# Patient Record
Sex: FEMALE | Race: WHITE | NOT HISPANIC OR LATINO | Employment: FULL TIME | URBAN - METROPOLITAN AREA
[De-identification: names, ages, dates, MRNs, and addresses within clinical notes are randomized per-mention and may not be internally consistent; named-entity substitution may affect disease eponyms.]

---

## 2017-01-27 ENCOUNTER — OFFICE VISIT (OUTPATIENT)
Dept: FAMILY MEDICINE | Facility: CLINIC | Age: 42
End: 2017-01-27
Payer: COMMERCIAL

## 2017-01-27 VITALS
BODY MASS INDEX: 19.75 KG/M2 | SYSTOLIC BLOOD PRESSURE: 100 MMHG | TEMPERATURE: 97.4 F | HEART RATE: 90 BPM | DIASTOLIC BLOOD PRESSURE: 66 MMHG | OXYGEN SATURATION: 99 % | RESPIRATION RATE: 12 BRPM | WEIGHT: 122.9 LBS | HEIGHT: 66 IN

## 2017-01-27 DIAGNOSIS — F90.1 ATTENTION-DEFICIT HYPERACTIVITY DISORDER, PREDOMINANTLY HYPERACTIVE TYPE: ICD-10-CM

## 2017-01-27 DIAGNOSIS — F41.9 ANXIETY AND DEPRESSION: Primary | ICD-10-CM

## 2017-01-27 DIAGNOSIS — F32.A ANXIETY AND DEPRESSION: Primary | ICD-10-CM

## 2017-01-27 PROCEDURE — 99213 OFFICE O/P EST LOW 20 MIN: CPT | Performed by: FAMILY MEDICINE

## 2017-01-27 RX ORDER — VENLAFAXINE HYDROCHLORIDE 75 MG/1
75 CAPSULE, EXTENDED RELEASE ORAL DAILY
Qty: 90 CAPSULE | Refills: 2 | Status: SHIPPED | OUTPATIENT
Start: 2017-01-27 | End: 2017-04-27

## 2017-01-27 RX ORDER — DEXTROAMPHETAMINE SACCHARATE, AMPHETAMINE ASPARTATE, DEXTROAMPHETAMINE SULFATE AND AMPHETAMINE SULFATE 7.5; 7.5; 7.5; 7.5 MG/1; MG/1; MG/1; MG/1
30 TABLET ORAL DAILY
Qty: 30 TABLET | Refills: 0 | Status: SHIPPED | OUTPATIENT
Start: 2017-01-27 | End: 2017-03-14

## 2017-01-27 ASSESSMENT — PAIN SCALES - GENERAL: PAINLEVEL: NO PAIN (0)

## 2017-01-27 NOTE — PROGRESS NOTES
SUBJECTIVE:                                                    Marjorie Holbrook is a 41 year old female who presents to clinic today for the following health issues:        Depression and Anxiety Follow-Up    Status since last visit: Improved     Other associated symptoms:None    Complicating factors:     Significant life event: No     Current substance abuse: None    PHQ-9 SCORE 11/3/2016 11/28/2016 12/29/2016   Total Score - - -   Total Score 21 12 3     JORDAN-7 SCORE 11/3/2016 11/28/2016 12/29/2016   Total Score - - -   Total Score 13 12 7        PHQ-9  English      PHQ-9   Any Language     GAD7         Amount of exercise or physical activity: None    Problems taking medications regularly: No    Medication side effects: none    Diet: regular (no restrictions)    Marjorie is here today for follow-up on anxiety/depression and ADHD. She was seen  a month ago and please see my dictation for further details. At the last visits, she was noted significant weight loss and there was concerns of the possible side effect of the Adderall that she was been taking. She was recommended to follow up. She is taking the Adderall daily as prescribed. She is also on Effexor for her depression and anxiety. Been taking medication prescribed in no side effect. Overall she is feeling great. Denies being depressed. No more anxiety. The Adderall is working well for her as well. She is able to concentrate and complete her task at and home and at work. Normal appetite and tolerated by mouth intake well. No heart palpitation or having trouble with sleeping. No personal or family history of cardiomyopathy. Denies drug use. She would like to continue with the medications. No suicidal or homicidal ideation. No hallucination. No other concern.     Problem list and histories reviewed & adjusted, as indicated.  Additional history: as documented    Patient Active Problem List   Diagnosis     CARDIOVASCULAR SCREENING; LDL GOAL LESS THAN 160      Menorrhagia     Dysmenorrhea     Iron deficiency anemia     Tobacco use disorder     Anxiety and depression     Attention-deficit hyperactivity disorder, predominantly hyperactive type     Past Surgical History   Procedure Laterality Date     Tubal ligation  2000     Tonsillectomy  1996     Dilation and curettage, hysteroscopy, ablate endometrium novasure, combined  9/20/2013     Procedure: COMBINED DILATION AND CURETTAGE, HYSTEROSCOPY, ABLATE ENDOMETRIUM NOVASURE;  Hysteroscopy, Novasure Endometrial Ablation, Currettings;  Surgeon: Michaelle Duran MD;  [facility]     Colonoscopy N/A 10/27/2014     Procedure: COLONOSCOPY;  Surgeon: Rashawn Mayer MD;  [facility]     Laparoscopy diagnostic (gyn) N/A 12/1/2014     Procedure: LAPAROSCOPY DIAGNOSTIC (GYN);  Surgeon: Rk العلي MD;  [facility]     Abdomen surgery       recent surgery see records     Biopsy       recent see records     Laparoscopic hysterectomy total N/A 4/7/2015     Procedure: LAPAROSCOPIC HYSTERECTOMY TOTAL;  Surgeon: Rk العلي MD;  [facility]     Salpingectomy Bilateral 4/7/2015     Procedure: SALPINGECTOMY;  Surgeon: Rk العلي MD;  [facility]     Cystoscopy N/A 4/7/2015     Procedure: CYSTOSCOPY;  Surgeon: Rk العلي MD;  [facility]       Social History   Substance Use Topics     Smoking status: Current Every Day Smoker -- 0.50 packs/day for 9 years     Types: Cigarettes     Start date: 01/01/2008     Smokeless tobacco: Never Used      Comment: less than .5 pack, started age 32     Alcohol Use: 0.0 oz/week     0 Standard drinks or equivalent per week      Comment: occ.     Family History   Problem Relation Age of Onset     Family History Negative Child      Thyroid Disease Mother      Alcohol/Drug Father      Respiratory Father      COPD     Depression Father      HEART DISEASE Father      A-Fib     Hypertension Maternal Grandmother              "    Current Outpatient Prescriptions   Medication Sig Dispense Refill     venlafaxine (EFFEXOR-XR) 75 MG 24 hr capsule Take 1 capsule (75 mg) by mouth daily 90 capsule 2     amphetamine-dextroamphetamine (ADDERALL) 30 MG per tablet Take 1 tablet (30 mg) by mouth daily 30 tablet 0     Allergies   Allergen Reactions     Metronidazole Nausea and Vomiting     Nkda [No Known Drug Allergies]        ROS:  Constitutional, HEENT, cardiovascular, pulmonary, gi and gu systems are negative, except as otherwise noted.    OBJECTIVE:                                                    /66 mmHg  Pulse 90  Temp(Src) 97.4  F (36.3  C) (Temporal)  Resp 12  Ht 5' 5.75\" (1.67 m)  Wt 122 lb 14.4 oz (55.747 kg)  BMI 19.99 kg/m2  SpO2 99%  LMP 04/30/2013  Body mass index is 19.99 kg/(m^2). - weight is stable since the last visit.  GENERAL: healthy, alert and no distress  HENT: ear canals and TM's normal.  Nares are non-congested. No tender with palpation to the sinuses.  NECK: no adenopathy and thyroid normal to palpation  RESP: lungs clear to auscultation - no rales, rhonchi or wheezes  CV: regular rate and rhythm, no murmur.  NEURO: Normal strength and tone, mentation intact and speech normal.  Dress appropriately.  PSYCH: mentation appears normal, affect normal/bright. Thought is intact, no hallucination, suicidal or homicidal     Diagnostic Test Results:  none      ASSESSMENT/PLAN:                                                    1. Anxiety and depression  Marjorie is overall doing well since the last visit.  She tolerates the Effexor well.  Compliance with the medication and encouraged her to keep up the good work. Continue with Effexor at 75 mg daily.  Follow up in 6 months, earlier as needed.  She was educated about symptoms to call in or be seen.  All of her questions were answered.    - venlafaxine (EFFEXOR-XR) 75 MG 24 hr capsule; Take 1 capsule (75 mg) by mouth daily  Dispense: 90 capsule; Refill: 2    2. " Attention-deficit hyperactivity disorder, predominantly hyperactive type  Marjorie is overall doing better.  She is responding well to the Adderall with no side effect from it.   Weight is stable.  Will continue with the current dose of Adderall and inform her that he should take it as needed only.  Follow up in 4 months, earlier as needed.  She feels comfortable with the plan and all of her questions were answered.    - amphetamine-dextroamphetamine (ADDERALL) 30 MG per tablet; Take 1 tablet (30 mg) by mouth daily  Dispense: 30 tablet; Refill: 0    Ovidio Jenkins Mai, MD  [facility]

## 2017-01-27 NOTE — NURSING NOTE
"Chief Complaint   Patient presents with     Depression     Anxiety     A.D.H.D       Initial /66 mmHg  Pulse 90  Temp(Src) 97.4  F (36.3  C) (Temporal)  Resp 12  Ht 5' 5.75\" (1.67 m)  Wt 122 lb 14.4 oz (55.747 kg)  BMI 19.99 kg/m2  SpO2 99%  LMP 04/30/2013 Estimated body mass index is 19.99 kg/(m^2) as calculated from the following:    Height as of this encounter: 5' 5.75\" (1.67 m).    Weight as of this encounter: 122 lb 14.4 oz (55.747 kg).  BP completed using cuff size: eliud Ford CMA (AAMA)      "

## 2017-03-14 DIAGNOSIS — F90.1 ATTENTION-DEFICIT HYPERACTIVITY DISORDER, PREDOMINANTLY HYPERACTIVE TYPE: ICD-10-CM

## 2017-03-14 RX ORDER — DEXTROAMPHETAMINE SACCHARATE, AMPHETAMINE ASPARTATE, DEXTROAMPHETAMINE SULFATE AND AMPHETAMINE SULFATE 7.5; 7.5; 7.5; 7.5 MG/1; MG/1; MG/1; MG/1
30 TABLET ORAL DAILY
Qty: 30 TABLET | Refills: 0 | Status: SHIPPED | OUTPATIENT
Start: 2017-03-14 | End: 2017-04-27

## 2017-03-14 NOTE — TELEPHONE ENCOUNTER
Adderall      Last Written Prescription Date:  1/27/17  Last Fill Quantity: 30,   # refills: 0  Last Office Visit with [facility] prescribing provider: 1/27/17  Future Office visit:       Routing refill request to provider for review/approval because:  Drug not on the [facility], [facility] or [facility] refill protocol or controlled substance    Katiuska Zabala MA 3/14/2017

## 2017-04-14 ENCOUNTER — TELEPHONE (OUTPATIENT)
Dept: FAMILY MEDICINE | Facility: CLINIC | Age: 42
End: 2017-04-14

## 2017-04-14 NOTE — TELEPHONE ENCOUNTER
Patient is due for a PHQ-9.  Index start date:4/3/17  Index end date:6/3/17    Will forward to schedulers to schedule patient for Effexor follow up before 6/3/17.  Please leave open when scheduled.  Thanks!  Mary Kraus RN

## 2017-04-14 NOTE — TELEPHONE ENCOUNTER
Scheduled patient for effexor follow up.    Thank you,   Judy Canada   for [facility]

## 2017-04-27 ENCOUNTER — OFFICE VISIT (OUTPATIENT)
Dept: FAMILY MEDICINE | Facility: CLINIC | Age: 42
End: 2017-04-27
Payer: COMMERCIAL

## 2017-04-27 VITALS
HEART RATE: 105 BPM | BODY MASS INDEX: 19.35 KG/M2 | RESPIRATION RATE: 18 BRPM | WEIGHT: 119 LBS | DIASTOLIC BLOOD PRESSURE: 60 MMHG | SYSTOLIC BLOOD PRESSURE: 90 MMHG | TEMPERATURE: 97.7 F | OXYGEN SATURATION: 99 %

## 2017-04-27 DIAGNOSIS — F41.9 ANXIETY AND DEPRESSION: ICD-10-CM

## 2017-04-27 DIAGNOSIS — F51.04 PSYCHOPHYSIOLOGICAL INSOMNIA: ICD-10-CM

## 2017-04-27 DIAGNOSIS — F90.1 ATTENTION-DEFICIT HYPERACTIVITY DISORDER, PREDOMINANTLY HYPERACTIVE TYPE: Primary | ICD-10-CM

## 2017-04-27 DIAGNOSIS — F32.A ANXIETY AND DEPRESSION: ICD-10-CM

## 2017-04-27 PROCEDURE — 99214 OFFICE O/P EST MOD 30 MIN: CPT | Performed by: FAMILY MEDICINE

## 2017-04-27 RX ORDER — VENLAFAXINE HYDROCHLORIDE 150 MG/1
150 CAPSULE, EXTENDED RELEASE ORAL DAILY
Qty: 90 CAPSULE | Refills: 1 | Status: SHIPPED | OUTPATIENT
Start: 2017-04-27 | End: 2017-12-20

## 2017-04-27 RX ORDER — TRAZODONE HYDROCHLORIDE 50 MG/1
50 TABLET, FILM COATED ORAL
Qty: 30 TABLET | Refills: 0 | Status: SHIPPED | OUTPATIENT
Start: 2017-04-27 | End: 2017-07-06

## 2017-04-27 RX ORDER — DEXTROAMPHETAMINE SACCHARATE, AMPHETAMINE ASPARTATE, DEXTROAMPHETAMINE SULFATE AND AMPHETAMINE SULFATE 7.5; 7.5; 7.5; 7.5 MG/1; MG/1; MG/1; MG/1
30 TABLET ORAL DAILY
Qty: 30 TABLET | Refills: 0 | Status: SHIPPED | OUTPATIENT
Start: 2017-04-27 | End: 2017-06-07

## 2017-04-27 ASSESSMENT — PAIN SCALES - GENERAL: PAINLEVEL: NO PAIN (0)

## 2017-04-27 NOTE — PROGRESS NOTES
SUBJECTIVE:                                                    Marjorie Holbrook is a 41 year old female who presents to clinic today for the following health issues:      Depression Followup    Status since last visit: Worsened something has happened    See PHQ-9 for current symptoms.  Other associated symptoms: None    Complicating factors:   Significant life event:  Yes-  Grandma passed away   Current substance abuse:  None  Anxiety or Panic symptoms:  Yes-      PHQ-9  English PHQ-9   Any Language            Amount of exercise or physical activity: occ    Problems taking medications regularly: No    Medication side effects: none    Diet: regular (no restrictions)    Marjorie is here today for follow-up on her depression and ADHD.  She's been taking the medications I prescribed with no side effect. She thinks the Effexor is helping but her depression isnot fully controlled yet. Grandmother passed away - 2 months ago and since then, the depression has been worse. Stated that she was very close to her grandmother. Before the death of her grandmother, was doing well.  Still has trouble with falling sleep about 3-4 times a week.  Tried Melantonin 10 mg not helped.  Has had racing thoughts. Effexor with no side effect.      ADHD is doing well with the current dose of the Adderall. No side effect from it. No heart palpitation. Normal appetite. No personal or family history history of cardiomyopathy.         Problem list and histories reviewed & adjusted, as indicated.  Additional history: as documented    Patient Active Problem List   Diagnosis     Menorrhagia     Dysmenorrhea     Iron deficiency anemia     Tobacco use disorder     Anxiety and depression     Attention-deficit hyperactivity disorder, predominantly hyperactive type     Past Surgical History:   Procedure Laterality Date     ABDOMEN SURGERY      recent surgery see records     BIOPSY      recent see records     COLONOSCOPY N/A 10/27/2014    Procedure:  COLONOSCOPY;  Surgeon: Rashawn Mayer MD;  [facility]     CYSTOSCOPY N/A 4/7/2015    Procedure: CYSTOSCOPY;  Surgeon: Rk العلي MD;  [facility]     DILATION AND CURETTAGE, HYSTEROSCOPY, ABLATE ENDOMETRIUM NOVASURE, COMBINED  9/20/2013    Procedure: COMBINED DILATION AND CURETTAGE, HYSTEROSCOPY, ABLATE ENDOMETRIUM NOVASURE;  Hysteroscopy, Novasure Endometrial Ablation, Currettings;  Surgeon: Michaelle Duran MD;  [facility]     LAPAROSCOPIC HYSTERECTOMY TOTAL N/A 4/7/2015    Procedure: LAPAROSCOPIC HYSTERECTOMY TOTAL;  Surgeon: Rk العلي MD;  [facility]     LAPAROSCOPY DIAGNOSTIC (GYN) N/A 12/1/2014    Procedure: LAPAROSCOPY DIAGNOSTIC (GYN);  Surgeon: Rk العلي MD;  [facility]     SALPINGECTOMY Bilateral 4/7/2015    Procedure: SALPINGECTOMY;  Surgeon: Rk العلي MD;  [facility]     TONSILLECTOMY  1996     TUBAL LIGATION  2000       Social History   Substance Use Topics     Smoking status: Current Every Day Smoker     Packs/day: 0.50     Years: 9.00     Types: Cigarettes     Start date: 1/1/2008     Smokeless tobacco: Never Used      Comment: less than .5 pack, started age 32     Alcohol use 0.0 oz/week     0 Standard drinks or equivalent per week      Comment: occ.     Family History   Problem Relation Age of Onset     Thyroid Disease Mother      Alcohol/Drug Father      Respiratory Father      COPD     Depression Father      HEART DISEASE Father      A-Fib     Hypertension Maternal Grandmother      Family History Negative Child          Current Outpatient Prescriptions   Medication Sig Dispense Refill     venlafaxine (EFFEXOR-XR) 75 MG 24 hr capsule Take 1 capsule (150 mg) by mouth daily 90 capsule 1     amphetamine-dextroamphetamine (ADDERALL) 30 MG per tablet Take 1 tablet (30 mg) by mouth daily 30 tablet 0     Allergies   Allergen Reactions     Metronidazole Nausea and Vomiting       Reviewed and updated as  needed this visit by clinical staff  Tobacco  Allergies  Med Hx  Surg Hx  Fam Hx  Soc Hx      Reviewed and updated as needed this visit by Provider         ROS:  Constitutional, HEENT, cardiovascular, pulmonary, gi and gu systems are negative, except as otherwise noted.    OBJECTIVE:                                                    BP 90/60 (BP Location: Left arm, Patient Position: Chair, Cuff Size: Adult Regular)  Pulse 105  Temp 97.7  F (36.5  C) (Temporal)  Resp 18  Wt 119 lb (54 kg)  LMP 04/30/2013  SpO2 99%  BMI 19.35 kg/m2  Body mass index is 19.35 kg/(m^2).   GENERAL: healthy, alert and no distress  HENT: ear canals and TM's normal.  Nares are non-congested. No tender with palpation to the sinuses.  NECK: no adenopathy and thyroid normal to palpation  RESP: lungs clear to auscultation - no rales, rhonchi or wheezes  CV: regular rate and rhythm, no murmur.  NEURO: Normal strength and tone, mentation intact and speech normal.  Dress appropriately.  PSYCH: mentation appears normal, affect normal/bright. Thought is intact, no hallucination, suicidal or homicidal     Diagnostic Test Results:  none      ASSESSMENT/PLAN:                                                    1. Attention-deficit hyperactivity disorder, predominantly hyperactive type  Marjorie is overall doing better.  She is responding well to the Adderall XL and has no side effect from it.  Will continue with the current dose of Adderall and inform her that he should take it as needed only.  Side effect also discussed.  Follow up in 4 months, earlier as needed.  She feels comfortable with the plan and all of her questions were answered.  No contraindication identified.    - amphetamine-dextroamphetamine (ADDERALL) 30 MG per tablet; Take 1 tablet (30 mg) by mouth daily  Dispense: 30 tablet; Refill: 0    2. Anxiety and depression  Not controlled especially since the death of her grandmother. No active suicidal or homicidal ideation. No  hallucination. She tolerates the Effexor well. Discussed with her about the nature of the condition. Increased Effexor to 150 mg daily. Side effect discussed. Discussed with her about counseling but she declined. Follow-up in 2-3 months, earlier as needed. She was educated about symptoms that need to be seen or call in. All of her questions were answered and she feels comfortable with the plan.    - venlafaxine (EFFEXOR-XR) 150 MG 24 hr capsule; Take 1 capsule (150 mg) by mouth daily  Dispense: 90 capsule; Refill: 1  - traZODone (DESYREL) 50 MG tablet; Take 1 tablet (50 mg) by mouth nightly as needed for sleep  Dispense: 30 tablet; Refill: 0    3. Psychophysiological insomnia    Good sleep hygiene discussed. Encouraged her to avoid high caffeinated beverages after 3 PM. Emphasized on healthy diet and regular exercise. Will have her try the trazodone 50 mg at night as needed. Side effect discussed. Call in if symptoms persist or is worse.    - traZODone (DESYREL) 50 MG tablet; Take 1 tablet (50 mg) by mouth nightly as needed for sleep  Dispense: 30 tablet; Refill: 0    Ovidio Jenkins Mai, MD  [facility]

## 2017-04-27 NOTE — MR AVS SNAPSHOT
After Visit Summary   4/27/2017    Marjorie Holbrook    MRN: [id number]           Patient Information     Date Of Birth          1975        Visit Information        Provider Department      4/27/2017 8:40 AM Ovidio Toribio MD [facility]        Today's Diagnoses     Attention-deficit hyperactivity disorder, predominantly hyperactive type    -  1    Anxiety and depression        Psychophysiological insomnia           Follow-ups after your visit        Follow-up notes from your care team     Return in about 3 months (around 7/27/2017).      Who to contact     If you have questions or need follow up information about today's clinic visit or your schedule please contact [facility] directly at [phone].  Normal or non-critical lab and imaging results will be communicated to you by MyChart, letter or phone within 4 business days after the clinic has received the results. If you do not hear from us within 7 days, please contact the clinic through [employer] or phone. If you have a critical or abnormal lab result, we will notify you by phone as soon as possible.  Submit refill requests through [employer] or call your pharmacy and they will forward the refill request to us. Please allow 3 business days for your refill to be completed.          Additional Information About Your Visit        MyChart Information     [employer] gives you secure access to your electronic health record. If you see a primary care provider, you can also send messages to your care team and make appointments. If you have questions, please call your primary care clinic.  If you do not have a primary care provider, please call [phone] and they will assist you.        Care EveryWhere ID     This is your Care EveryWhere ID. This could be used by other organizations to access your [place] medical records  [id number]        Your Vitals Were     Pulse Temperature Respirations Last Period Pulse Oximetry BMI  (Body Mass Index)    105 97.7  F (36.5  C) (Temporal) 18 04/30/2013 99% 19.35 kg/m2       Blood Pressure from Last 3 Encounters:   04/27/17 90/60   01/27/17 100/66   12/29/16 98/62    Weight from Last 3 Encounters:   04/27/17 119 lb (54 kg)   01/27/17 122 lb 14.4 oz (55.7 kg)   12/29/16 121 lb 1.6 oz (54.9 kg)              Today, you had the following     No orders found for display         Today's Medication Changes          These changes are accurate as of: 4/27/17 11:59 PM.  If you have any questions, ask your nurse or doctor.               Start taking these medicines.        Dose/Directions    traZODone 50 MG tablet   Commonly known as:  DESYREL   Used for:  Anxiety and depression, Psychophysiological insomnia   Started by:  Ovidio Toribio MD        Dose:  50 mg   Take 1 tablet (50 mg) by mouth nightly as needed for sleep   Quantity:  30 tablet   Refills:  0         These medicines have changed or have updated prescriptions.        Dose/Directions    venlafaxine 150 MG 24 hr capsule   Commonly known as:  EFFEXOR-XR   This may have changed:    - medication strength  - how much to take   Used for:  Anxiety and depression   Changed by:  Ovidio Toribio MD        Dose:  150 mg   Take 1 capsule (150 mg) by mouth daily   Quantity:  90 capsule   Refills:  1            Where to get your medicines      These medications were sent [address]  [address] [facility] [address]     Phone:  [phone]     traZODone 50 MG tablet    venlafaxine 150 MG 24 hr capsule         Some of these will need a paper prescription and others can be bought over the counter.  Ask your nurse if you have questions.     Bring a paper prescription for each of these medications     amphetamine-dextroamphetamine 30 MG per tablet                Primary Care Provider Office Phone # Fax #    Cait Gonzalez PA-C [phone]       [address] [address]        Thank you!      Thank you for choosing [facility]  for your care. Our goal is always to provide you with excellent care. Hearing back from our patients is one way we can continue to improve our services. Please take a few minutes to complete the written survey that you may receive in the mail after your visit with us. Thank you!             Your Updated Medication List - Protect others around you: Learn how to safely use, store and throw away your medicines at www.disposemymeds.org.          This list is accurate as of: 4/27/17 11:59 PM.  Always use your most recent med list.                   Brand Name Dispense Instructions for use    amphetamine-dextroamphetamine 30 MG per tablet    ADDERALL    30 tablet    Take 1 tablet (30 mg) by mouth daily       traZODone 50 MG tablet    DESYREL    30 tablet    Take 1 tablet (50 mg) by mouth nightly as needed for sleep       venlafaxine 150 MG 24 hr capsule    EFFEXOR-XR    90 capsule    Take 1 capsule (150 mg) by mouth daily

## 2017-04-27 NOTE — NURSING NOTE
"Chief Complaint   Patient presents with     Depression       Initial BP 90/60 (BP Location: Left arm, Patient Position: Chair, Cuff Size: Adult Regular)  Pulse 105  Temp 97.7  F (36.5  C) (Temporal)  Resp 18  Wt 119 lb (54 kg)  LMP 04/30/2013  SpO2 99%  BMI 19.35 kg/m2 Estimated body mass index is 19.35 kg/(m^2) as calculated from the following:    Height as of 1/27/17: 5' 5.75\" (1.67 m).    Weight as of this encounter: 119 lb (54 kg).  Medication Reconciliation: complete   Елена/MUNA     "

## 2017-04-28 ASSESSMENT — PATIENT HEALTH QUESTIONNAIRE - PHQ9: SUM OF ALL RESPONSES TO PHQ QUESTIONS 1-9: 17

## 2017-06-07 DIAGNOSIS — F90.1 ATTENTION-DEFICIT HYPERACTIVITY DISORDER, PREDOMINANTLY HYPERACTIVE TYPE: ICD-10-CM

## 2017-06-07 NOTE — TELEPHONE ENCOUNTER
Adderall      Last Written Prescription Date:  4/27/17  Last Fill Quantity: 30,   # refills: 0  Last Office Visit with [facility], [facility] or [facility] prescribing provider: 4/27/17  Future Office visit:    Next 5 appointments (look out 90 days)     Jul 06, 2017  8:40 AM CDT   Office Visit with Ovidio Jenkins Mai, MD   [facility] ([facility]    [address] [place] [id number] [phone]                   Routing refill request to provider for review/approval because:  Drug not on the [facility], [facility] or [facility] refill protocol or controlled substance  Katiuska Zabala MA 6/7/2017

## 2017-06-14 RX ORDER — DEXTROAMPHETAMINE SACCHARATE, AMPHETAMINE ASPARTATE, DEXTROAMPHETAMINE SULFATE AND AMPHETAMINE SULFATE 7.5; 7.5; 7.5; 7.5 MG/1; MG/1; MG/1; MG/1
30 TABLET ORAL DAILY
Qty: 30 TABLET | Refills: 0 | Status: SHIPPED | OUTPATIENT
Start: 2017-06-14 | End: 2017-07-18

## 2017-07-06 ENCOUNTER — OFFICE VISIT (OUTPATIENT)
Dept: FAMILY MEDICINE | Facility: CLINIC | Age: 42
End: 2017-07-06
Payer: COMMERCIAL

## 2017-07-06 VITALS
WEIGHT: 121.5 LBS | HEART RATE: 90 BPM | RESPIRATION RATE: 16 BRPM | BODY MASS INDEX: 19.76 KG/M2 | TEMPERATURE: 97.6 F | SYSTOLIC BLOOD PRESSURE: 112 MMHG | DIASTOLIC BLOOD PRESSURE: 68 MMHG | OXYGEN SATURATION: 99 %

## 2017-07-06 DIAGNOSIS — F51.04 PSYCHOPHYSIOLOGICAL INSOMNIA: Primary | ICD-10-CM

## 2017-07-06 DIAGNOSIS — F17.200 TOBACCO USE DISORDER: ICD-10-CM

## 2017-07-06 PROCEDURE — 99213 OFFICE O/P EST LOW 20 MIN: CPT | Performed by: FAMILY MEDICINE

## 2017-07-06 RX ORDER — TRAZODONE HYDROCHLORIDE 50 MG/1
50-150 TABLET, FILM COATED ORAL
Qty: 90 TABLET | Refills: 0 | Status: SHIPPED | OUTPATIENT
Start: 2017-07-06 | End: 2017-12-20

## 2017-07-06 ASSESSMENT — ANXIETY QUESTIONNAIRES
5. BEING SO RESTLESS THAT IT IS HARD TO SIT STILL: NOT AT ALL
7. FEELING AFRAID AS IF SOMETHING AWFUL MIGHT HAPPEN: NOT AT ALL
6. BECOMING EASILY ANNOYED OR IRRITABLE: MORE THAN HALF THE DAYS
IF YOU CHECKED OFF ANY PROBLEMS ON THIS QUESTIONNAIRE, HOW DIFFICULT HAVE THESE PROBLEMS MADE IT FOR YOU TO DO YOUR WORK, TAKE CARE OF THINGS AT HOME, OR GET ALONG WITH OTHER PEOPLE: SOMEWHAT DIFFICULT
2. NOT BEING ABLE TO STOP OR CONTROL WORRYING: SEVERAL DAYS
1. FEELING NERVOUS, ANXIOUS, OR ON EDGE: MORE THAN HALF THE DAYS
3. WORRYING TOO MUCH ABOUT DIFFERENT THINGS: SEVERAL DAYS
GAD7 TOTAL SCORE: 7

## 2017-07-06 ASSESSMENT — PAIN SCALES - GENERAL: PAINLEVEL: NO PAIN (0)

## 2017-07-06 ASSESSMENT — PATIENT HEALTH QUESTIONNAIRE - PHQ9: 5. POOR APPETITE OR OVEREATING: SEVERAL DAYS

## 2017-07-06 NOTE — NURSING NOTE
"Chief Complaint   Patient presents with     Recheck Medication     Adderall and Trazodone     Depression     Anxiety       Initial /68 (BP Location: Left arm, Patient Position: Chair, Cuff Size: Adult Regular)  Pulse 90  Temp 97.6  F (36.4  C) (Temporal)  Resp 16  Wt 121 lb 8 oz (55.1 kg)  LMP 04/30/2013  SpO2 99%  Breastfeeding? No  BMI 19.76 kg/m2 Estimated body mass index is 19.76 kg/(m^2) as calculated from the following:    Height as of 1/27/17: 5' 5.75\" (1.67 m).    Weight as of this encounter: 121 lb 8 oz (55.1 kg).  Medication Reconciliation: complete     There are no preventive care reminders to display for this patient.    [employer], OBDULIO      "

## 2017-07-06 NOTE — MR AVS SNAPSHOT
After Visit Summary   7/6/2017    Marjorie Holbrook    MRN: [id number]           Patient Information     Date Of Birth          1975        Visit Information        Provider Department      7/6/2017 8:40 AM Ovidio Toribio MD [facility]        Today's Diagnoses     Psychophysiological insomnia    -  1    Tobacco use disorder           Follow-ups after your visit        Follow-up notes from your care team     Return in about 1 month (around 8/6/2017).      Who to contact     If you have questions or need follow up information about today's clinic visit or your schedule please contact [facility] directly at [phone].  Normal or non-critical lab and imaging results will be communicated to you by MyChart, letter or phone within 4 business days after the clinic has received the results. If you do not hear from us within 7 days, please contact the clinic through [facility] or phone. If you have a critical or abnormal lab result, we will notify you by phone as soon as possible.  Submit refill requests through Weebly or call your pharmacy and they will forward the refill request to us. Please allow 3 business days for your refill to be completed.          Additional Information About Your Visit        MyChart Information     Weebly gives you secure access to your electronic health record. If you see a primary care provider, you can also send messages to your care team and make appointments. If you have questions, please call your primary care clinic.  If you do not have a primary care provider, please call [phone] and they will assist you.        Care EveryWhere ID     This is your Care EveryWhere ID. This could be used by other organizations to access your [place] medical records  [id number]        Your Vitals Were     Pulse Temperature Respirations Last Period Pulse Oximetry Breastfeeding?    90 97.6  F (36.4  C) (Temporal) 16 04/30/2013 99% No    BMI (Body Mass  Index)                   19.76 kg/m2            Blood Pressure from Last 3 Encounters:   07/06/17 112/68   04/27/17 90/60   01/27/17 100/66    Weight from Last 3 Encounters:   07/06/17 121 lb 8 oz (55.1 kg)   04/27/17 119 lb (54 kg)   01/27/17 122 lb 14.4 oz (55.7 kg)              Today, you had the following     No orders found for display         Today's Medication Changes          These changes are accurate as of: 7/6/17  1:06 PM.  If you have any questions, ask your nurse or doctor.               Start taking these medicines.        Dose/Directions    varenicline 0.5 MG X 11 & 1 MG X 42 tablet   Commonly known as:  CHANTIX STARTING MONTH PAK   Used for:  Tobacco use disorder   Started by:  Ovidio Toribio MD        Take 0.5 mg tab daily for 3 days, then 0.5 mg tab twice daily for 4 days, then 1 mg twice daily.   Quantity:  53 tablet   Refills:  0         These medicines have changed or have updated prescriptions.        Dose/Directions    traZODone 50 MG tablet   Commonly known as:  DESYREL   This may have changed:  how much to take   Used for:  Psychophysiological insomnia   Changed by:  Ovidio Toribio MD        Dose:   mg   Take 1-3 tablets ( mg) by mouth nightly as needed for sleep   Quantity:  90 tablet   Refills:  0            Where to get your medicines      These medications were sent [address]  [address] [address]     Phone:  [phone]     traZODone 50 MG tablet    varenicline 0.5 MG X 11 & 1 MG X 42 tablet                Primary Care Provider Office Phone # Fax #    Cait Gonzalez PA-C [phone]       [address] [address]        Equal Access to Services     ANDREW GARNER AH: Cori Roman, shadia rose, keon kaalrashawn jordan, shorty Ribeiro [facility] [phone].    ATENCIÓN: Si habla español, tiene a velasquez disposición servicios gratuitos de  asistencia lingüística. Simeon al [phone].    We comply with applicable federal civil rights laws and [place] laws. We do not discriminate on the basis of race, color, national origin, age, disability sex, sexual orientation or gender identity.            Thank you!     Thank you for choosing [facility]  for your care. Our goal is always to provide you with excellent care. Hearing back from our patients is one way we can continue to improve our services. Please take a few minutes to complete the written survey that you may receive in the mail after your visit with us. Thank you!             Your Updated Medication List - Protect others around you: Learn how to safely use, store and throw away your medicines at www.disposemymeds.org.          This list is accurate as of: 7/6/17  1:06 PM.  Always use your most recent med list.                   Brand Name Dispense Instructions for use Diagnosis    amphetamine-dextroamphetamine 30 MG per tablet    ADDERALL    30 tablet    Take 1 tablet (30 mg) by mouth daily    Attention-deficit hyperactivity disorder, predominantly hyperactive type       traZODone 50 MG tablet    DESYREL    90 tablet    Take 1-3 tablets ( mg) by mouth nightly as needed for sleep    Psychophysiological insomnia       varenicline 0.5 MG X 11 & 1 MG X 42 tablet    CHANTIX STARTING MONTH MALINA    53 tablet    Take 0.5 mg tab daily for 3 days, then 0.5 mg tab twice daily for 4 days, then 1 mg twice daily.    Tobacco use disorder       venlafaxine 150 MG 24 hr capsule    EFFEXOR-XR    90 capsule    Take 1 capsule (150 mg) by mouth daily    Anxiety and depression

## 2017-07-06 NOTE — PROGRESS NOTES
SUBJECTIVE:                                                    Marjorie Holbrook is a 41 year old female who presents to clinic today for the following health issues:    Depression and Anxiety Follow-Up    Status since last visit: Improved     Other associated symptoms:None    Complicating factors:     Significant life event: No     Current substance abuse: None    PHQ-9 SCORE 2016   Total Score - - -   Total Score 3 17 9     JORDAN-7 SCORE 2016   Total Score - - -   Total Score 12 7 7       Marjorie is here today for couple concerns. First is follow-up on depression and anxiety. She is taking Effexor 150 mg and it is going well. No side effect. She is happy with the medication. No depression or anxiety. She also has ADHD and is on Adderall. The current dose working well for her as well. No concern in regard to this.    She also has insomnia. She has trouble with falling asleep. At the last visit, she was started on trazodone 50 mg. Initially it worked for about couple weeks. Now it is no longer effective. She is wondering if there is anything else for it. No side effect from the trazodone.    She also would like to stop smoking. She smoked since  about half a pack per day. Never tried to stop smoking before. She is motivated. Good support system at home for this.    Next concerns about ovarian cancer risk. Her grandmother who  at 102 and she was diagnosed with ovarian cancer at last months of her life. There is no family history breast cancer or urine cancer. No other concern    PHQ-9  English  PHQ-9   Any Language  GAD7    Amount of exercise or physical activity: None    Problems taking medications regularly: No    Medication side effects: none    Diet: regular (no restrictions) and breakfast skipped      PROBLEMS TO ADD ON...  Medication Followup of Adderall and Trazodone    Taking Medication as prescribed: yes    Side Effects:  None    Medication Helping  Symptoms:  Adderall - yes, Trazodone does not seem to be helping       Problem list and histories reviewed & adjusted, as indicated.  Additional history: as documented    Current Outpatient Prescriptions   Medication Sig Dispense Refill     traZODone (DESYREL) 50 MG tablet Take 1-3 tablets ( mg) by mouth nightly as needed for sleep 90 tablet 0     varenicline (CHANTIX STARTING MONTH PAK) 0.5 MG X 11 & 1 MG X 42 tablet Take 0.5 mg tab daily for 3 days, then 0.5 mg tab twice daily for 4 days, then 1 mg twice daily. 53 tablet 0     amphetamine-dextroamphetamine (ADDERALL) 30 MG per tablet Take 1 tablet (30 mg) by mouth daily 30 tablet 0     venlafaxine (EFFEXOR-XR) 150 MG 24 hr capsule Take 1 capsule (150 mg) by mouth daily 90 capsule 1     [DISCONTINUED] traZODone (DESYREL) 50 MG tablet Take 1 tablet (50 mg) by mouth nightly as needed for sleep 30 tablet 0     Allergies   Allergen Reactions     Metronidazole Nausea and Vomiting       Reviewed and updated as needed this visit by clinical staff  Tobacco  Allergies  Meds  Soc Hx      Reviewed and updated as needed this visit by Provider         ROS:  Constitutional, HEENT, cardiovascular, pulmonary, gi and gu systems are negative, except as otherwise noted.    OBJECTIVE:     /68 (BP Location: Left arm, Patient Position: Chair, Cuff Size: Adult Regular)  Pulse 90  Temp 97.6  F (36.4  C) (Temporal)  Resp 16  Wt 121 lb 8 oz (55.1 kg)  LMP 04/30/2013  SpO2 99%  Breastfeeding? No  BMI 19.76 kg/m2  Body mass index is 19.76 kg/(m^2).   GENERAL: healthy, alert and no distress  PSYCH: mentation appears normal, affect normal/bright. Thought is intact, no hallucination, suicidal or homicidal       Diagnostic Test Results:  none     ASSESSMENT/PLAN:     1. Psychophysiological insomnia  Discussed with her about nature of the condition.  Did not responding to lower dose of trazodone.  Good sleeping hygiene was dicussed. Again, encouraged her to avoid high  caffeinated beaverages after 3 pm.  Avoid any stimulant in the bedroom, include TV, radio, light or reading book.  Get up if not able to fall sleep in 30 minutes and go back to bed again when feel tire.  Avoid taking naps during the day complete.  Also recommend healthy diet and daily regular exercise.  Increase the Trazodone to 100-150 mg at bedtime as needed.  Side effect discussed.  Call in if not improve or worsening.    - traZODone (DESYREL) 50 MG tablet; Take 1-3 tablets ( mg) by mouth nightly as needed for sleep  Dispense: 90 tablet; Refill: 0    2. Tobacco use disorder  Marjorie has been smoking tobacco for years.  Never tried to stop smoking and is motivated to quit at this time.  Discussed with her about nature of the condition and informed her that I am very glad that she is ready to quit.  I confirmed her that quitting smoking tobacco will have good affect on her health for both short and long term.  Discussed with her about different options for smoking cessation aids.  She would like to try the Chantix. No contraindication identified. Prescription was given and side effects discussed.  Educate her about symptoms to call in or be seen.  In a mean time, recommend her to set quit date and let her loves ones know about her plan.  Also I encourage her to reduce to amount of cigarrets smoke each day as much as possible.  Follow up in a month.  All of her questions were answered.    - varenicline (CHANTIX STARTING MONTH PAK) 0.5 MG X 11 & 1 MG X 42 tablet; Take 0.5 mg tab daily for 3 days, then 0.5 mg tab twice daily for 4 days, then 1 mg twice daily.  Dispense: 53 tablet; Refill: 0    3.  Depression and anxiety  Overall, she is doing well. She feels both of her anxiety and depression are controlled. Her PH Q9 score was 9 and JORDAN 7 score was 7 today.  Will continue with the Effexor at 150 mg daily.    4. ADHD    Doing well and stable. No change on the Adderall overdose.    In term of her concern about  ovarian cancer risk, I informed her that her risk for ovarian cancer is not greatly increased due to the fact that it happened to her grandmother who  of it at age 102. Also there is no family history of breast or uterine cancer. Informed her that there is currently no standard screening test for ovarian cancer. Discussed about genetic testing although it probably is not the best option based on her family history. She is is okay to monitor for now. She was educated about symptoms that need to be seen or call in.      Ovidio Jenkins Mai, MD  [facility]

## 2017-07-07 ASSESSMENT — ANXIETY QUESTIONNAIRES: GAD7 TOTAL SCORE: 7

## 2017-07-07 ASSESSMENT — PATIENT HEALTH QUESTIONNAIRE - PHQ9: SUM OF ALL RESPONSES TO PHQ QUESTIONS 1-9: 9

## 2017-07-18 DIAGNOSIS — F90.1 ATTENTION-DEFICIT HYPERACTIVITY DISORDER, PREDOMINANTLY HYPERACTIVE TYPE: ICD-10-CM

## 2017-07-18 NOTE — TELEPHONE ENCOUNTER
Adderall      Last Written Prescription Date:  6/14/177  Last Fill Quantity: 30,   # refills: 0  Last Office Visit with [facility], [facility] or [facility] prescribing provider: 7/6/17  Future Office visit:       Routing refill request to provider for review/approval because:  Drug not on the [facility], [facility] or [facility] refill protocol or controlled substance  Katiuska Zabala MA 7/18/2017

## 2017-07-19 RX ORDER — DEXTROAMPHETAMINE SACCHARATE, AMPHETAMINE ASPARTATE, DEXTROAMPHETAMINE SULFATE AND AMPHETAMINE SULFATE 7.5; 7.5; 7.5; 7.5 MG/1; MG/1; MG/1; MG/1
30 TABLET ORAL DAILY
Qty: 30 TABLET | Refills: 0 | Status: SHIPPED | OUTPATIENT
Start: 2017-07-19 | End: 2017-08-01

## 2017-07-20 NOTE — TELEPHONE ENCOUNTER
Rx faxed to [facility] and put up front.  ................Jairon Drake LPN,   July 20, 2017,      7:36 AM,   [facility]

## 2017-07-31 ENCOUNTER — TELEPHONE (OUTPATIENT)
Dept: FAMILY MEDICINE | Facility: CLINIC | Age: 42
End: 2017-07-31

## 2017-07-31 DIAGNOSIS — F90.1 ATTENTION-DEFICIT HYPERACTIVITY DISORDER, PREDOMINANTLY HYPERACTIVE TYPE: ICD-10-CM

## 2017-07-31 RX ORDER — DEXTROAMPHETAMINE SACCHARATE, AMPHETAMINE ASPARTATE, DEXTROAMPHETAMINE SULFATE AND AMPHETAMINE SULFATE 7.5; 7.5; 7.5; 7.5 MG/1; MG/1; MG/1; MG/1
30 TABLET ORAL DAILY
Qty: 30 TABLET | Refills: 0 | Status: CANCELLED | OUTPATIENT
Start: 2017-07-31

## 2017-07-31 NOTE — TELEPHONE ENCOUNTER
Early refill request, please find out more. Look like last refill was 7/19/17 - not sure if it was an error. thanks

## 2017-07-31 NOTE — TELEPHONE ENCOUNTER
aderral      Last Written Prescription Date:  7/19/17  Last Fill Quantity: 30,   # refills: 0  Last Office Visit with [facility], [facility] or [facility] prescribing provider: 7/06/17  Future Office visit:       Routing refill request to provider for review/approval because:  Drug not on the [facility], [facility] or [facility] refill protocol or controlled substance

## 2017-08-01 RX ORDER — DEXTROAMPHETAMINE SACCHARATE, AMPHETAMINE ASPARTATE, DEXTROAMPHETAMINE SULFATE AND AMPHETAMINE SULFATE 7.5; 7.5; 7.5; 7.5 MG/1; MG/1; MG/1; MG/1
30 TABLET ORAL DAILY
Qty: 30 TABLET | Refills: 0 | Status: SHIPPED | OUTPATIENT
Start: 2017-08-01 | End: 2017-09-08

## 2017-08-01 NOTE — TELEPHONE ENCOUNTER
Patient is calling stating that she DID NOT receive the paper copy on July 17th. Requesting a new one, and would like to pick it up tomorrow

## 2017-08-01 NOTE — TELEPHONE ENCOUNTER
Spoke to the patient she never received the script or wasn't received my [place] on the 19th.     Spoke to the pharmacy and the last fill they have is 6/16/17 and they never received the script for the month of July. Can we print a new one and patient wants to pick it up tomorrow?    Please advise.  Katiuska Zabala MA 8/1/2017

## 2017-08-01 NOTE — TELEPHONE ENCOUNTER
I spoke to the patients pharmacy and they stated the last script that the filled for the patient on this particular med was 6/16/17.  I looked at the last refill request and Jairon Drake LPN stated: Rx faxed to [facility] and put up front.  ................Jairon Drake LPN,   July 20, 2017,      7:36 AM,   [facility]      Provider please advise.  Ryley Wagner MA

## 2017-08-01 NOTE — TELEPHONE ENCOUNTER
Still not able to reach the patient by phone.  I have deleted the refill request since it is to soon.  If the patient calls backplease inform her and if another script needs to be ordered please sent to the provider.  Ryley Wagner MA

## 2017-08-01 NOTE — TELEPHONE ENCOUNTER
Patient returned call and states she did receive the paper script for the RX on 7.19, please advise.  [phone]  Thank you,  Fara Tempel  Patient Representative

## 2017-09-08 DIAGNOSIS — F90.1 ATTENTION-DEFICIT HYPERACTIVITY DISORDER, PREDOMINANTLY HYPERACTIVE TYPE: ICD-10-CM

## 2017-09-08 NOTE — TELEPHONE ENCOUNTER
Amphetamine-dextroamphetamine (ADDERALL) 30 MG per tablet      Last Written Prescription Date:  8/1/17  Last Fill Quantity: 30,   # refills: 0  Last Office Visit with [facility] or [facility] prescribing provider: 7/6/17  Future Office visit:       Routing refill request to provider for review/approval because:  Drug not on the [facility], [facility] or [facility] refill protocol or controlled substance    Claudine Thompson CMA

## 2017-09-12 RX ORDER — DEXTROAMPHETAMINE SACCHARATE, AMPHETAMINE ASPARTATE, DEXTROAMPHETAMINE SULFATE AND AMPHETAMINE SULFATE 7.5; 7.5; 7.5; 7.5 MG/1; MG/1; MG/1; MG/1
30 TABLET ORAL DAILY
Qty: 30 TABLET | Refills: 0 | Status: SHIPPED | OUTPATIENT
Start: 2017-09-12 | End: 2017-10-26

## 2017-09-13 ENCOUNTER — TELEPHONE (OUTPATIENT)
Dept: FAMILY MEDICINE | Facility: CLINIC | Age: 42
End: 2017-09-13

## 2017-09-13 NOTE — TELEPHONE ENCOUNTER
LOWELL WAS HERE AT CLINIC AND REQUESTED REFILL, AS SHE WENT TO SPECIFIED PHARMACY AND IT WAS NOT THERE.  SHE SIGNED FOR HARD COPY.

## 2017-10-26 ENCOUNTER — TELEPHONE (OUTPATIENT)
Dept: FAMILY MEDICINE | Facility: CLINIC | Age: 42
End: 2017-10-26

## 2017-10-26 DIAGNOSIS — F90.1 ATTENTION-DEFICIT HYPERACTIVITY DISORDER, PREDOMINANTLY HYPERACTIVE TYPE: ICD-10-CM

## 2017-10-26 RX ORDER — DEXTROAMPHETAMINE SACCHARATE, AMPHETAMINE ASPARTATE, DEXTROAMPHETAMINE SULFATE AND AMPHETAMINE SULFATE 7.5; 7.5; 7.5; 7.5 MG/1; MG/1; MG/1; MG/1
30 TABLET ORAL DAILY
Qty: 30 TABLET | Refills: 0 | Status: SHIPPED | OUTPATIENT
Start: 2017-10-26 | End: 2017-12-21

## 2017-10-26 NOTE — TELEPHONE ENCOUNTER
Reason for call:  Medication   If this is a refill request, has the caller requested the refill from the pharmacy already? No  Will the patient be using a [facility]? Yes  Name of the pharmacy and phone number for the current request: [facility]    Name of the medication requested: amphetamine-dextroamphetamine (ADDERALL) 30 MG   Other request: Paper script to be left at       Phone number to reach patient:  Home number on file [phone] (home)    Best Time:  ANy    Can we leave a detailed message on this number?  YES

## 2017-12-17 ENCOUNTER — HEALTH MAINTENANCE LETTER (OUTPATIENT)
Age: 42
End: 2017-12-17

## 2017-12-20 DIAGNOSIS — F32.A ANXIETY AND DEPRESSION: ICD-10-CM

## 2017-12-20 DIAGNOSIS — F51.04 PSYCHOPHYSIOLOGICAL INSOMNIA: ICD-10-CM

## 2017-12-20 DIAGNOSIS — F41.9 ANXIETY AND DEPRESSION: ICD-10-CM

## 2017-12-21 DIAGNOSIS — F90.1 ATTENTION-DEFICIT HYPERACTIVITY DISORDER, PREDOMINANTLY HYPERACTIVE TYPE: ICD-10-CM

## 2017-12-21 RX ORDER — DEXTROAMPHETAMINE SACCHARATE, AMPHETAMINE ASPARTATE, DEXTROAMPHETAMINE SULFATE AND AMPHETAMINE SULFATE 7.5; 7.5; 7.5; 7.5 MG/1; MG/1; MG/1; MG/1
30 TABLET ORAL DAILY
Qty: 30 TABLET | Refills: 0 | Status: SHIPPED | OUTPATIENT
Start: 2017-12-21 | End: 2018-03-14

## 2017-12-21 RX ORDER — VENLAFAXINE HYDROCHLORIDE 150 MG/1
CAPSULE, EXTENDED RELEASE ORAL
Qty: 90 CAPSULE | Refills: 1 | Status: SHIPPED | OUTPATIENT
Start: 2017-12-21 | End: 2018-09-09

## 2017-12-21 RX ORDER — TRAZODONE HYDROCHLORIDE 50 MG/1
TABLET, FILM COATED ORAL
Qty: 90 TABLET | Refills: 1 | Status: SHIPPED | OUTPATIENT
Start: 2017-12-21 | End: 2018-06-18

## 2017-12-21 NOTE — TELEPHONE ENCOUNTER
Trazodone Prescription approved per [facility] Refill Protocol.    Effexor Routing refill request to provider for review/approval because:  PHQ9 greater then 5.     Silva Kirby RN    PHQ-9 score:    PHQ-9 SCORE 7/6/2017   Total Score -   Total Score 9

## 2017-12-21 NOTE — TELEPHONE ENCOUNTER
Requested Prescriptions   Pending Prescriptions Disp Refills     traZODone (DESYREL) 50 MG tablet [Pharmacy Med Name: TRAZODONE HCL 50MG TABS] 90 tablet 0     Sig: TAKE 1-3 TABLETS BY MOUTH NIGHTLY AS NEEDED FOR SLEEP    Serotonin Modulators Passed    12/20/2017  3:34 PM       Passed - Recent or future visit with authorizing provider's specialty    Patient had office visit in the last year or has a visit in the next 30 days with authorizing provider.  See chart review.              Passed - Patient is age 18 or older       Passed - No active pregnancy on record       Passed - No positive pregnancy test in past 12 months        venlafaxine (EFFEXOR-XR) 150 MG 24 hr capsule [Pharmacy Med Name: VENLAFAXINE HCL ER 150MG CP24] 90 capsule 1     Sig: TAKE ONE CAPSULE BY MOUTH ONCE DAILY    Serotonin-Norepinephrine Reuptake Inhibitors  Failed    12/20/2017  3:34 PM       Failed - PHQ-9 score of less than 5 in past 6 months    The PHQ-9 criteria is meant to fail. It requires a PHQ-9 score review         Failed - Normal serum creatinine on file in past 12 months    Recent Labs   Lab Test  11/03/16   1218   CR  0.71            Passed - Blood pressure under 140/90    BP Readings from Last 3 Encounters:   07/06/17 112/68   04/27/17 90/60   01/27/17 100/66                Passed - Recent or future visit with authorizing provider's specialty    Patient had office visit in the last year or has a visit in the next 30 days with authorizing provider.  See chart review.              Passed - Patient is age 18 or older       Passed - No active pregnancy on record       Passed - No positive pregnancy test in past 12 months       Passed - Recent (6 mo) or future visit with authorizing provider's specialty    Patient had office visit in the last 6 months or has a visit in the next 30 days with authorizing provider.  See chart review.

## 2017-12-21 NOTE — TELEPHONE ENCOUNTER
Amphetamne-dextroamphetamin (ADDERALL) 30 MG per tablet      Last Written Prescription Date: 10/26/2017  Last Fill Quantity: 30,  # refills: 0   Last Office Visit with [facility], [facility] or [facility] prescribing provider: 07/06/2017  Claudine Thompson CMA

## 2017-12-21 NOTE — TELEPHONE ENCOUNTER
Routing refill request to provider for review/approval because:  Drug not on the [facility] refill protocol.    Silva Kirby RN

## 2018-03-14 DIAGNOSIS — F90.1 ATTENTION-DEFICIT HYPERACTIVITY DISORDER, PREDOMINANTLY HYPERACTIVE TYPE: ICD-10-CM

## 2018-03-14 RX ORDER — DEXTROAMPHETAMINE SACCHARATE, AMPHETAMINE ASPARTATE, DEXTROAMPHETAMINE SULFATE AND AMPHETAMINE SULFATE 7.5; 7.5; 7.5; 7.5 MG/1; MG/1; MG/1; MG/1
30 TABLET ORAL DAILY
Qty: 30 TABLET | Refills: 0 | Status: SHIPPED | OUTPATIENT
Start: 2018-03-14 | End: 2018-05-30

## 2018-03-14 NOTE — TELEPHONE ENCOUNTER
Adderall      Last Written Prescription Date:  12/21/2017  Last Fill Quantity: 30,   # refills: 0  Last Office Visit: 7/06/2017  Future Office visit:       Routing refill request to provider for review/approval because:  Drug not on the [facility], [facility] or [facility] refill protocol or controlled substance

## 2018-03-14 NOTE — TELEPHONE ENCOUNTER
Script faxed to [facility] [phone]  Pharmacy and placed for     Adderall 30 mg     Laura BAR

## 2018-05-30 DIAGNOSIS — F90.1 ATTENTION-DEFICIT HYPERACTIVITY DISORDER, PREDOMINANTLY HYPERACTIVE TYPE: ICD-10-CM

## 2018-05-30 NOTE — TELEPHONE ENCOUNTER
Reason for Call:  Medication or medication refill:    Do you use a [facility]?  Name of the pharmacy and phone number for the current request:  [facility] - [phone]    Name of the medication requested: adderall    Other request: Marjorie Forbes called her refill in on 5-25-18, she will be out of her medication on 5-31-18. Marjorie Forbes is leaving town on 6-1-18 and will be out of town until 6-15-18, she is scheduled to see Dr Toribio on 6-18-18. Can she get enough medication to get her through to her appointment.    Can we leave a detailed message on this number? YES    Phone number patient can be reached at: Home number on file [phone] (home)    Best Time:     Call taken on 5/30/2018 at 12:12 PM by Shaylee Carvajal

## 2018-05-30 NOTE — TELEPHONE ENCOUNTER
Left message for patient to return call. Per note on Adderall sig PCP asked for a follow up before medication runs out there is no upcoming appointment please schedule him for an appointment.  Cait Aguillon MA

## 2018-05-30 NOTE — TELEPHONE ENCOUNTER
adderall       Last Written Prescription Date:  3/14/2018  Last Fill Quantity: 30,   # refills: 0  Last Office Visit: 7/6/2017  Future Office visit:    Next 5 appointments (look out 90 days)     Jun 18, 2018  4:00 PM CDT   [facility] Jenkins [facility]    [address] [place] [id number]   [phone]                   Routing refill request to provider for review/approval because:  Drug not on the [facility], [facility] or  [facility] refill protocol or controlled substance

## 2018-05-31 RX ORDER — DEXTROAMPHETAMINE SACCHARATE, AMPHETAMINE ASPARTATE, DEXTROAMPHETAMINE SULFATE AND AMPHETAMINE SULFATE 7.5; 7.5; 7.5; 7.5 MG/1; MG/1; MG/1; MG/1
30 TABLET ORAL DAILY
Qty: 30 TABLET | Refills: 0 | Status: SHIPPED | OUTPATIENT
Start: 2018-05-31 | End: 2018-11-19

## 2018-06-18 ENCOUNTER — OFFICE VISIT (OUTPATIENT)
Dept: FAMILY MEDICINE | Facility: CLINIC | Age: 43
End: 2018-06-18
Payer: COMMERCIAL

## 2018-06-18 VITALS
WEIGHT: 121.2 LBS | HEART RATE: 74 BPM | RESPIRATION RATE: 14 BRPM | BODY MASS INDEX: 19.71 KG/M2 | TEMPERATURE: 98.8 F | OXYGEN SATURATION: 98 % | SYSTOLIC BLOOD PRESSURE: 102 MMHG | DIASTOLIC BLOOD PRESSURE: 60 MMHG

## 2018-06-18 DIAGNOSIS — F32.A ANXIETY AND DEPRESSION: Primary | ICD-10-CM

## 2018-06-18 DIAGNOSIS — F90.1 ATTENTION-DEFICIT HYPERACTIVITY DISORDER, PREDOMINANTLY HYPERACTIVE TYPE: ICD-10-CM

## 2018-06-18 DIAGNOSIS — F41.9 ANXIETY AND DEPRESSION: Primary | ICD-10-CM

## 2018-06-18 DIAGNOSIS — R53.83 FATIGUE, UNSPECIFIED TYPE: ICD-10-CM

## 2018-06-18 DIAGNOSIS — Z90.710 H/O HYSTERECTOMY FOR BENIGN DISEASE: ICD-10-CM

## 2018-06-18 LAB
ANION GAP SERPL CALCULATED.3IONS-SCNC: 9 MMOL/L (ref 3–14)
BASOPHILS # BLD AUTO: 0.1 10E9/L (ref 0–0.2)
BASOPHILS NFR BLD AUTO: 0.9 %
BUN SERPL-MCNC: 13 MG/DL (ref 7–30)
CALCIUM SERPL-MCNC: 8.7 MG/DL (ref 8.5–10.1)
CHLORIDE SERPL-SCNC: 108 MMOL/L (ref 94–109)
CO2 SERPL-SCNC: 25 MMOL/L (ref 20–32)
CREAT SERPL-MCNC: 0.64 MG/DL (ref 0.52–1.04)
DIFFERENTIAL METHOD BLD: NORMAL
EOSINOPHIL NFR BLD AUTO: 6.4 %
ERYTHROCYTE [DISTWIDTH] IN BLOOD BY AUTOMATED COUNT: 12.2 % (ref 10–15)
GFR SERPL CREATININE-BSD FRML MDRD: >90 ML/MIN/1.7M2
GLUCOSE SERPL-MCNC: 87 MG/DL (ref 70–99)
HCT VFR BLD AUTO: 40.8 % (ref 35–47)
HGB BLD-MCNC: 13.9 G/DL (ref 11.7–15.7)
IMM GRANULOCYTES # BLD: 0 10E9/L (ref 0–0.4)
IMM GRANULOCYTES NFR BLD: 0.2 %
LYMPHOCYTES # BLD AUTO: 1.7 10E9/L (ref 0.8–5.3)
LYMPHOCYTES NFR BLD AUTO: 28.9 %
MCH RBC QN AUTO: 32.9 PG (ref 26.5–33)
MCHC RBC AUTO-ENTMCNC: 34.1 G/DL (ref 31.5–36.5)
MCV RBC AUTO: 97 FL (ref 78–100)
MONOCYTES # BLD AUTO: 0.4 10E9/L (ref 0–1.3)
MONOCYTES NFR BLD AUTO: 7.3 %
NEUTROPHILS # BLD AUTO: 3.2 10E9/L (ref 1.6–8.3)
NEUTROPHILS NFR BLD AUTO: 56.3 %
NRBC # BLD AUTO: 0 10*3/UL
NRBC BLD AUTO-RTO: 0 /100
PLATELET # BLD AUTO: 306 10E9/L (ref 150–450)
POTASSIUM SERPL-SCNC: 4 MMOL/L (ref 3.4–5.3)
RBC # BLD AUTO: 4.23 10E12/L (ref 3.8–5.2)
SODIUM SERPL-SCNC: 142 MMOL/L (ref 133–144)
TSH SERPL DL<=0.005 MIU/L-ACNC: 0.66 MU/L (ref 0.4–4)
WBC # BLD AUTO: 5.7 10E9/L (ref 4–11)

## 2018-06-18 PROCEDURE — 84443 ASSAY THYROID STIM HORMONE: CPT | Performed by: FAMILY MEDICINE

## 2018-06-18 PROCEDURE — 99214 OFFICE O/P EST MOD 30 MIN: CPT | Performed by: FAMILY MEDICINE

## 2018-06-18 PROCEDURE — 82306 VITAMIN D 25 HYDROXY: CPT | Performed by: FAMILY MEDICINE

## 2018-06-18 PROCEDURE — 36415 COLL VENOUS BLD VENIPUNCTURE: CPT | Performed by: FAMILY MEDICINE

## 2018-06-18 PROCEDURE — 85025 COMPLETE CBC W/AUTO DIFF WBC: CPT | Performed by: FAMILY MEDICINE

## 2018-06-18 PROCEDURE — 80048 BASIC METABOLIC PNL TOTAL CA: CPT | Performed by: FAMILY MEDICINE

## 2018-06-18 RX ORDER — DEXTROAMPHETAMINE SACCHARATE, AMPHETAMINE ASPARTATE, DEXTROAMPHETAMINE SULFATE AND AMPHETAMINE SULFATE 7.5; 7.5; 7.5; 7.5 MG/1; MG/1; MG/1; MG/1
30 TABLET ORAL DAILY
Qty: 30 TABLET | Refills: 0 | Status: SHIPPED | OUTPATIENT
Start: 2018-06-18 | End: 2018-07-18

## 2018-06-18 RX ORDER — HYDROXYZINE HYDROCHLORIDE 25 MG/1
25-50 TABLET, FILM COATED ORAL EVERY 8 HOURS PRN
Qty: 60 TABLET | Refills: 1 | Status: SHIPPED | OUTPATIENT
Start: 2018-06-18 | End: 2018-12-07

## 2018-06-18 RX ORDER — BUPROPION HYDROCHLORIDE 150 MG/1
150 TABLET ORAL EVERY MORNING
Qty: 30 TABLET | Refills: 1 | Status: SHIPPED | OUTPATIENT
Start: 2018-06-18 | End: 2018-09-03

## 2018-06-18 RX ORDER — DEXTROAMPHETAMINE SACCHARATE, AMPHETAMINE ASPARTATE, DEXTROAMPHETAMINE SULFATE AND AMPHETAMINE SULFATE 7.5; 7.5; 7.5; 7.5 MG/1; MG/1; MG/1; MG/1
30 TABLET ORAL DAILY
Qty: 30 TABLET | Refills: 0 | Status: SHIPPED | OUTPATIENT
Start: 2018-08-18 | End: 2018-09-17

## 2018-06-18 RX ORDER — DEXTROAMPHETAMINE SACCHARATE, AMPHETAMINE ASPARTATE, DEXTROAMPHETAMINE SULFATE AND AMPHETAMINE SULFATE 7.5; 7.5; 7.5; 7.5 MG/1; MG/1; MG/1; MG/1
30 TABLET ORAL DAILY
Qty: 30 TABLET | Refills: 0 | Status: SHIPPED | OUTPATIENT
Start: 2018-07-18 | End: 2018-09-17

## 2018-06-18 ASSESSMENT — ANXIETY QUESTIONNAIRES
1. FEELING NERVOUS, ANXIOUS, OR ON EDGE: MORE THAN HALF THE DAYS
2. NOT BEING ABLE TO STOP OR CONTROL WORRYING: MORE THAN HALF THE DAYS
6. BECOMING EASILY ANNOYED OR IRRITABLE: NEARLY EVERY DAY
7. FEELING AFRAID AS IF SOMETHING AWFUL MIGHT HAPPEN: NOT AT ALL
IF YOU CHECKED OFF ANY PROBLEMS ON THIS QUESTIONNAIRE, HOW DIFFICULT HAVE THESE PROBLEMS MADE IT FOR YOU TO DO YOUR WORK, TAKE CARE OF THINGS AT HOME, OR GET ALONG WITH OTHER PEOPLE: VERY DIFFICULT
3. WORRYING TOO MUCH ABOUT DIFFERENT THINGS: MORE THAN HALF THE DAYS
5. BEING SO RESTLESS THAT IT IS HARD TO SIT STILL: SEVERAL DAYS
GAD7 TOTAL SCORE: 11

## 2018-06-18 ASSESSMENT — PATIENT HEALTH QUESTIONNAIRE - PHQ9: 5. POOR APPETITE OR OVEREATING: SEVERAL DAYS

## 2018-06-18 ASSESSMENT — PAIN SCALES - GENERAL: PAINLEVEL: NO PAIN (0)

## 2018-06-18 NOTE — NURSING NOTE
Chief Complaint   Patient presents with     Recheck Medication     adderall     Health Maintenance Due   Topic Date Due     HIV SCREEN (SYSTEM ASSIGNED)  09/05/1991     PAP Q3 YR  10/09/2017     DEPRESSION ACTION PLAN Q1 YR  11/03/2017     PHQ-9 Q6 MONTHS  01/06/2018     [employer] [facility]

## 2018-06-18 NOTE — MR AVS SNAPSHOT
After Visit Summary   6/18/2018    Marjorie Holbrook    MRN: [id number]           Patient Information     Date Of Birth          1975        Visit Information        Provider Department      6/18/2018 4:00 PM Ovidio Toribio MD [facility]        Today's Diagnoses     Anxiety and depression    -  1    Attention-deficit hyperactivity disorder, predominantly hyperactive type        Fatigue, unspecified type        H/O hysterectomy for benign disease           Follow-ups after your visit        Follow-up notes from your care team     Return in about 3 months (around 9/18/2018).      Who to contact     If you have questions or need follow up information about today's clinic visit or your schedule please contact [facility] directly at [phone].  Normal or non-critical lab and imaging results will be communicated to you by [employer], letter or phone within 4 business days after the clinic has received the results. If you do not hear from us within 7 days, please contact the clinic through [employer] or phone. If you have a critical or abnormal lab result, we will notify you by phone as soon as possible.  Submit refill requests through [employer] or call your pharmacy and they will forward the refill request to us. Please allow 3 business days for your refill to be completed.          Additional Information About Your Visit        MyChart Information     [employer] gives you secure access to your electronic health record. If you see a primary care provider, you can also send messages to your care team and make appointments. If you have questions, please call your primary care clinic.  If you do not have a primary care provider, please call [phone] and they will assist you.        Care EveryWhere ID     This is your Care EveryWhere ID. This could be used by other organizations to access your [place] medical records  [id number]        Your Vitals Were     Pulse Temperature  Respirations Last Period Pulse Oximetry Breastfeeding?    74 98.8  F (37.1  C) (Temporal) 14 08/21/2013 98% No    BMI (Body Mass Index)                   19.71 kg/m2            Blood Pressure from Last 3 Encounters:   06/18/18 102/60   07/06/17 112/68   04/27/17 90/60    Weight from Last 3 Encounters:   06/18/18 121 lb 3.2 oz (55 kg)   07/06/17 121 lb 8 oz (55.1 kg)   04/27/17 119 lb (54 kg)              We Performed the Following     Basic metabolic panel  (Ca, Cl, CO2, Creat, Gluc, K, Na, BUN)     CBC with platelets and differential     TSH with free T4 reflex     Vitamin D Deficiency          Today's Medication Changes          These changes are accurate as of 6/18/18 11:59 PM.  If you have any questions, ask your nurse or doctor.               Start taking these medicines.        Dose/Directions    buPROPion 150 MG 24 hr tablet   Commonly known as:  WELLBUTRIN XL   Used for:  Anxiety and depression   Started by:  Ovidio Toribio MD        Dose:  150 mg   Take 1 tablet (150 mg) by mouth every morning   Quantity:  30 tablet   Refills:  1       hydrOXYzine 25 MG tablet   Commonly known as:  ATARAX   Used for:  Anxiety and depression   Started by:  Ovidio Toribio MD        Dose:  25-50 mg   Take 1-2 tablets (25-50 mg) by mouth every 8 hours as needed for anxiety (insomnia)   Quantity:  60 tablet   Refills:  1         These medicines have changed or have updated prescriptions.        Dose/Directions    * amphetamine-dextroamphetamine 30 MG per tablet   Commonly known as:  ADDERALL   This may have changed:  Another medication with the same name was added. Make sure you understand how and when to take each.   Used for:  Attention-deficit hyperactivity disorder, predominantly hyperactive type   Changed by:  Ovidio Toribio MD        Dose:  30 mg   Take 1 tablet (30 mg) by mouth daily Must follow up before med runs out   Quantity:  30 tablet   Refills:  0       * amphetamine-dextroamphetamine 30 MG per tablet   Commonly known  as:  ADDERALL   This may have changed:  You were already taking a medication with the same name, and this prescription was added. Make sure you understand how and when to take each.   Used for:  Attention-deficit hyperactivity disorder, predominantly hyperactive type   Changed by:  Ovidio Toribio MD        Dose:  30 mg   Take 1 tablet (30 mg) by mouth daily   Quantity:  30 tablet   Refills:  0       * amphetamine-dextroamphetamine 30 MG per tablet   Commonly known as:  ADDERALL   This may have changed:  You were already taking a medication with the same name, and this prescription was added. Make sure you understand how and when to take each.   Used for:  Attention-deficit hyperactivity disorder, predominantly hyperactive type   Changed by:  Ovidio Toribio MD        Dose:  30 mg   Start taking on:  7/18/2018   Take 1 tablet (30 mg) by mouth daily   Quantity:  30 tablet   Refills:  0       * amphetamine-dextroamphetamine 30 MG per tablet   Commonly known as:  ADDERALL   This may have changed:  You were already taking a medication with the same name, and this prescription was added. Make sure you understand how and when to take each.   Used for:  Attention-deficit hyperactivity disorder, predominantly hyperactive type   Changed by:  Ovidio Toribio MD        Dose:  30 mg   Start taking on:  8/18/2018   Take 1 tablet (30 mg) by mouth daily   Quantity:  30 tablet   Refills:  0       * Notice:  This list has 4 medication(s) that are the same as other medications prescribed for you. Read the directions carefully, and ask your doctor or other care provider to review them with you.      Stop taking these medicines if you haven't already. Please contact your care team if you have questions.     traZODone 50 MG tablet   Commonly known as:  DESYREL   Stopped by:  Ovidio Toribio MD           varenicline 0.5 MG X 11 & 1 MG X 42 tablet   Commonly known as:  CHANTIX STARTING MONTH PAK   Stopped by:  Ovidio Toribio MD                 Where to get your medicines      These medications were sent to [facility] [address] [place] [address], [place] - [address]  [address], [facility] [address]     Phone:  [phone]     buPROPion 150 MG 24 hr tablet    hydrOXYzine 25 MG tablet         Some of these will need a paper prescription and others can be bought over the counter.  Ask your nurse if you have questions.     Bring a paper prescription for each of these medications     amphetamine-dextroamphetamine 30 MG per tablet    amphetamine-dextroamphetamine 30 MG per tablet    amphetamine-dextroamphetamine 30 MG per tablet                Primary Care Provider Office Phone # Fax #    Ovidio Jenkins Mai, [phone] [address] [place] [address]        Equal Access to Services     ANDREW GARNER : Hadii paolo clayo Soberenice, waaxda luqadaha, qaybta kaalmada adedarwinyada, shorty dc. So [facility] [phone].    ATENCIÓN: Si habla español, tiene a velasquez disposición servicios gratuitos de asistencia lingüística. [facility] [phone].    We comply with applicable federal civil rights laws and [place] laws. We do not discriminate on the basis of race, color, national origin, age, disability, sex, sexual orientation, or gender identity.            Thank you!     Thank you for choosing [facility]  for your care. Our goal is always to provide you with excellent care. Hearing back from our patients is one way we can continue to improve our services. Please take a few minutes to complete the written survey that you may receive in the mail after your visit with us. Thank you!             Your Updated Medication List - Protect others around you: Learn how to safely use, store and throw away your medicines at www.disposemymeds.org.          This list is accurate as of 6/18/18 11:59 PM.  Always use your most recent med list.                   Brand Name Dispense Instructions for use Diagnosis    *  amphetamine-dextroamphetamine 30 MG per tablet    ADDERALL    30 tablet    Take 1 tablet (30 mg) by mouth daily Must follow up before med runs out    Attention-deficit hyperactivity disorder, predominantly hyperactive type       * amphetamine-dextroamphetamine 30 MG per tablet    ADDERALL    30 tablet    Take 1 tablet (30 mg) by mouth daily    Attention-deficit hyperactivity disorder, predominantly hyperactive type       * amphetamine-dextroamphetamine 30 MG per tablet   Start taking on:  7/18/2018    ADDERALL    30 tablet    Take 1 tablet (30 mg) by mouth daily    Attention-deficit hyperactivity disorder, predominantly hyperactive type       * amphetamine-dextroamphetamine 30 MG per tablet   Start taking on:  8/18/2018    ADDERALL    30 tablet    Take 1 tablet (30 mg) by mouth daily    Attention-deficit hyperactivity disorder, predominantly hyperactive type       buPROPion 150 MG 24 hr tablet    WELLBUTRIN XL    30 tablet    Take 1 tablet (150 mg) by mouth every morning    Anxiety and depression       hydrOXYzine 25 MG tablet    ATARAX    60 tablet    Take 1-2 tablets (25-50 mg) by mouth every 8 hours as needed for anxiety (insomnia)    Anxiety and depression       venlafaxine 150 MG 24 hr capsule    EFFEXOR-XR    90 capsule    TAKE ONE CAPSULE BY MOUTH ONCE DAILY    Anxiety and depression       * Notice:  This list has 4 medication(s) that are the same as other medications prescribed for you. Read the directions carefully, and ask your doctor or other care provider to review them with you.

## 2018-06-18 NOTE — PROGRESS NOTES
SUBJECTIVE:   Marjorie Holbrook is a 42 year old female who presents to clinic today for the following health issues:    Medication Followup of Adderall    Taking Medication as prescribed: yes    Side Effects:  None    Medication Helping Symptoms:  yes       Marjorie is here today for follow-up on her depressions/anxiety and ADHD.  She was last seen for them on July 2017 and please see my last dictation for further details.  She been taking the medication as prescribed with no side effect.  Overall she doing pretty well.  The ADHD is controlled with Adderall with no side effect.  Denies of heart palpitation.  No personal or family history is of cardiomyopathy.  No problem with sleeping with the trazodone for which she takes as needed.  No chest pain or shortness of breath.  Normal appetite and there were no significant change in her weight.  Her depression is also doing okay.  She is taking the Effexor as prescribed for a while and has no side effect.  She has been feeling more tire and been sleeping more.  Still feels depressed some days.  Anxiety is doing well.  No suicidal or homicidal ideation.  Stated that the trazodone is working really well but it makes her sleepy and drowsy in the morning.  Eating and drinking well.  No other concern today.      Stated that she had a hysterectomy done couple years ago for dysmenorrhagia and menorrhagia.    Problem list and histories reviewed & adjusted, as indicated.  Additional history: as documented    Current Outpatient Prescriptions   Medication Sig Dispense Refill     amphetamine-dextroamphetamine (ADDERALL) 30 MG per tablet Take 1 tablet (30 mg) by mouth daily 30 tablet 0     [START ON 7/18/2018] amphetamine-dextroamphetamine (ADDERALL) 30 MG per tablet Take 1 tablet (30 mg) by mouth daily 30 tablet 0     [START ON 8/18/2018] amphetamine-dextroamphetamine (ADDERALL) 30 MG per tablet Take 1 tablet (30 mg) by mouth daily 30 tablet 0     amphetamine-dextroamphetamine  (ADDERALL) 30 MG per tablet Take 1 tablet (30 mg) by mouth daily Must follow up before med runs out 30 tablet 0     buPROPion (WELLBUTRIN XL) 150 MG 24 hr tablet Take 1 tablet (150 mg) by mouth every morning 30 tablet 1     hydrOXYzine (ATARAX) 25 MG tablet Take 1-2 tablets (25-50 mg) by mouth every 8 hours as needed for anxiety (insomnia) 60 tablet 1     venlafaxine (EFFEXOR-XR) 150 MG 24 hr capsule TAKE ONE CAPSULE BY MOUTH ONCE DAILY 90 capsule 1     Allergies   Allergen Reactions     Metronidazole Nausea and Vomiting       Reviewed and updated as needed this visit by clinical staff  Tobacco  Allergies  Meds  Soc Hx      Reviewed and updated as needed this visit by Provider         ROS:  Constitutional, HEENT, cardiovascular, pulmonary, gi and gu systems are negative, except as otherwise noted.    OBJECTIVE:     /60 (BP Location: Right arm, Patient Position: Sitting, Cuff Size: Adult Regular)  Pulse 74  Temp 98.8  F (37.1  C) (Temporal)  Resp 14  Wt 121 lb 3.2 oz (55 kg)  LMP 08/21/2013  SpO2 98%  Breastfeeding? No  BMI 19.71 kg/m2  Body mass index is 19.71 kg/(m^2).   GENERAL: healthy, alert and no distress  HENT: ear canals and TM's normal.  Nares are non-congested. No tender with palpation to the sinuses.  NECK: no adenopathy and thyroid normal to palpation  RESP: lungs clear to auscultation - no rales, rhonchi or wheezes  CV: regular rate and rhythm, no murmur.  NEURO: Normal strength and tone, mentation intact and speech normal.  Dress appropriately.  PSYCH: mentation appears normal, affect normal/bright. Thought is intact, no hallucination, suicidal or homicidal     Diagnostic Test Results:  Results for orders placed or performed in visit on 06/18/18   TSH with free T4 reflex   Result Value Ref Range    TSH 0.66 0.40 - 4.00 mU/L   CBC with platelets and differential   Result Value Ref Range    WBC 5.7 4.0 - 11.0 10e9/L    RBC Count 4.23 3.8 - 5.2 10e12/L    Hemoglobin 13.9 11.7 - 15.7 g/dL     Hematocrit 40.8 35.0 - 47.0 %    MCV 97 78 - 100 fl    MCH 32.9 26.5 - 33.0 pg    MCHC 34.1 31.5 - 36.5 g/dL    RDW 12.2 10.0 - 15.0 %    Platelet Count 306 150 - 450 10e9/L    Diff Method Automated Method     % Neutrophils 56.3 %    % Lymphocytes 28.9 %    % Monocytes 7.3 %    % Eosinophils 6.4 %    % Basophils 0.9 %    % Immature Granulocytes 0.2 %    Nucleated RBCs 0 0 /100    Absolute Neutrophil 3.2 1.6 - 8.3 10e9/L    Absolute Lymphocytes 1.7 0.8 - 5.3 10e9/L    Absolute Monocytes 0.4 0.0 - 1.3 10e9/L    Absolute Basophils 0.1 0.0 - 0.2 10e9/L    Abs Immature Granulocytes 0.0 0 - 0.4 10e9/L    Absolute Nucleated RBC 0.0    Basic metabolic panel  (Ca, Cl, CO2, Creat, Gluc, K, Na, BUN)   Result Value Ref Range    Sodium 142 133 - 144 mmol/L    Potassium 4.0 3.4 - 5.3 mmol/L    Chloride 108 94 - 109 mmol/L    Carbon Dioxide 25 20 - 32 mmol/L    Anion Gap 9 3 - 14 mmol/L    Glucose 87 70 - 99 mg/dL    Urea Nitrogen 13 7 - 30 mg/dL    Creatinine 0.64 0.52 - 1.04 mg/dL    GFR Estimate >90 >60 mL/min/1.7m2    GFR Estimate If Black >90 >60 mL/min/1.7m2    Calcium 8.7 8.5 - 10.1 mg/dL   Vitamin D Deficiency   Result Value Ref Range    Vitamin D Deficiency screening 25 20 - 75 ug/L       ASSESSMENT/PLAN:       1. Attention-deficit hyperactivity disorder, predominantly hyperactive type  Doing well and is stable.  Tolerating medication well.  No contraindications identified.  Will continue with the Adderall at 30 mg daily.  Follow-up in 6 months, earlier as needed.  - amphetamine-dextroamphetamine (ADDERALL) 30 MG per tablet; Take 1 tablet (30 mg) by mouth daily  Dispense: 30 tablet; Refill: 0  - amphetamine-dextroamphetamine (ADDERALL) 30 MG per tablet; Take 1 tablet (30 mg) by mouth daily  Dispense: 30 tablet; Refill: 0  - amphetamine-dextroamphetamine (ADDERALL) 30 MG per tablet; Take 1 tablet (30 mg) by mouth daily  Dispense: 30 tablet; Refill: 0    2. Anxiety and depression  Overall she is doing ok.  She feels both  her anxiety and depression are tolerable although they can be better.  Been taking medication as prescribed with no side effect.  No suicidal or homicidal ideation.  No hallucination.  Her PHQ 9 score of 15 and JORDAN 7 score of 9 today.  Both of these scores have increased significantly as compared to last appointment.  Discussed with her about nature of the conditions.  Will continue with Effexor.  Adding Wellbutrin and side effect discussed.  She has no history of seizure disorder.  Also start on Atarax or hydroxyzine as needed for anxiety and insomnia.  Will stop the trazodone.  Follow-up in 2-3 months, earlier if any concerns or question.  She was also educated about symptoms to be seen or call in.    - buPROPion (WELLBUTRIN XL) 150 MG 24 hr tablet; Take 1 tablet (150 mg) by mouth every morning  Dispense: 30 tablet; Refill: 1  - hydrOXYzine (ATARAX) 25 MG tablet; Take 1-2 tablets (25-50 mg) by mouth every 8 hours as needed for anxiety (insomnia)  Dispense: 60 tablet; Refill: 1    3. Fatigue, unspecified type  Most likely due to uncontrolled depression.  No history of diabetes, thyroid problem or anemia.  However basic lab work as ordered today and they were normal.  Emphasized on daily exercise, healthy diet with adequate resting and fluid intake.  Also avoid high caffeine/sugar intake.    - TSH with free T4 reflex  - CBC with platelets and differential  - Basic metabolic panel  (Ca, Cl, CO2, Creat, Gluc, K, Na, BUN)  - Vitamin D Deficiency      Ovidio Jenkins Mai, MD  [facility]

## 2018-06-19 LAB — DEPRECATED CALCIDIOL+CALCIFEROL SERPL-MC: 25 UG/L (ref 20–75)

## 2018-06-19 ASSESSMENT — PATIENT HEALTH QUESTIONNAIRE - PHQ9: SUM OF ALL RESPONSES TO PHQ QUESTIONS 1-9: 15

## 2018-06-19 ASSESSMENT — ANXIETY QUESTIONNAIRES: GAD7 TOTAL SCORE: 11

## 2018-06-20 PROBLEM — Z90.710 H/O HYSTERECTOMY FOR BENIGN DISEASE: Status: ACTIVE | Noted: 2018-06-20

## 2018-09-09 ENCOUNTER — OFFICE VISIT (OUTPATIENT)
Dept: URGENT CARE | Facility: RETAIL CLINIC | Age: 43
End: 2018-09-09
Payer: COMMERCIAL

## 2018-09-09 VITALS
DIASTOLIC BLOOD PRESSURE: 62 MMHG | TEMPERATURE: 98.3 F | SYSTOLIC BLOOD PRESSURE: 109 MMHG | HEART RATE: 94 BPM | OXYGEN SATURATION: 99 %

## 2018-09-09 DIAGNOSIS — L73.9 FOLLICULITIS: Primary | ICD-10-CM

## 2018-09-09 DIAGNOSIS — F41.9 ANXIETY AND DEPRESSION: ICD-10-CM

## 2018-09-09 DIAGNOSIS — F32.A ANXIETY AND DEPRESSION: ICD-10-CM

## 2018-09-09 PROCEDURE — 99213 OFFICE O/P EST LOW 20 MIN: CPT | Performed by: INTERNAL MEDICINE

## 2018-09-09 RX ORDER — MUPIROCIN 20 MG/G
OINTMENT TOPICAL 3 TIMES DAILY
Qty: 22 G | Refills: 1 | Status: SHIPPED | OUTPATIENT
Start: 2018-09-09 | End: 2018-09-14

## 2018-09-09 RX ORDER — SULFAMETHOXAZOLE/TRIMETHOPRIM 800-160 MG
1 TABLET ORAL 2 TIMES DAILY
Qty: 20 TABLET | Refills: 0 | Status: SHIPPED | OUTPATIENT
Start: 2018-09-09 | End: 2018-09-19

## 2018-09-09 NOTE — MR AVS SNAPSHOT
After Visit Summary   9/9/2018    Marjorie Holbrook    MRN: [id number]           Patient Information     Date Of Birth          1975        Visit Information        Provider Department      9/9/2018 9:20 AM Alfie Pickett MD [facility]        Today's Diagnoses     Folliculitis    -  1       Follow-ups after your visit        Who to contact     You can reach your care team any time of the day by calling [phone].  Notification of test results:  If you have an abnormal lab result, we will notify you by phone as soon as possible.         Additional Information About Your Visit        MyChart Information     [employer] gives you secure access to your electronic health record. If you see a primary care provider, you can also send messages to your care team and make appointments. If you have questions, please call your primary care clinic.  If you do not have a primary care provider, please call [phone] and they will assist you.        Care EveryWhere ID     This is your Care EveryWhere ID. This could be used by other organizations to access your [place] medical records  [id number]        Your Vitals Were     Pulse Temperature Last Period Pulse Oximetry          94 98.3  F (36.8  C) (Oral) 08/21/2013 99%         Blood Pressure from Last 3 Encounters:   09/09/18 109/62   06/18/18 102/60   07/06/17 112/68    Weight from Last 3 Encounters:   06/18/18 121 lb 3.2 oz (55 kg)   07/06/17 121 lb 8 oz (55.1 kg)   04/27/17 119 lb (54 kg)              Today, you had the following     No orders found for display         Today's Medication Changes          These changes are accurate as of 9/9/18  9:47 AM.  If you have any questions, ask your nurse or doctor.               Start taking these medicines.        Dose/Directions    mupirocin 2 % ointment   Commonly known as:  BACTROBAN   Used for:  Folliculitis   Started by:  Alfie Pickett MD        Apply topically 3 times daily for 5  days   Quantity:  22 g   Refills:  1       sulfamethoxazole-trimethoprim 800-160 MG per tablet   Commonly known as:  BACTRIM DS/SEPTRA DS   Used for:  Folliculitis   Started by:  Alfie Pickett MD        Dose:  1 tablet   Take 1 tablet by mouth 2 times daily for 10 days   Quantity:  20 tablet   Refills:  0            Where to get your medicines      These medications were sent to [address]  [address], [facility] [address]     Phone:  [phone]     mupirocin 2 % ointment    sulfamethoxazole-trimethoprim 800-160 MG per tablet                Primary Care Provider Office Phone # Fax #    Artjasmyn Gregory Toribio [phone] [address] [place] [address]        Equal Access to Services     ANDREW GARNER AH: Hadii paolo diaz hadasho Soomaali, waaxda luqadaha, qaybta kaalmada adeegyada, shorty romero haymarybel guzman . So [facility] [phone].    ATENCIÓN: Si habla español, tiene a velasquez disposición servicios gratuitos de asistencia lingüística. Llame al [phone].    We comply with applicable federal civil rights laws and [place] laws. We do not discriminate on the basis of race, color, national origin, age, disability, sex, sexual orientation, or gender identity.            Thank you!     Thank you for choosing [facility]  for your care. Our goal is always to provide you with excellent care. Hearing back from our patients is one way we can continue to improve our services. Please take a few minutes to complete the written survey that you may receive in the mail after your visit with us. Thank you!             Your Updated Medication List - Protect others around you: Learn how to safely use, store and throw away your medicines at www.disposemymeds.org.          This list is accurate as of 9/9/18  9:47 AM.  Always use your most recent med list.                   Brand Name Dispense Instructions for use Diagnosis    *  amphetamine-dextroamphetamine 30 MG per tablet    ADDERALL    30 tablet    Take 1 tablet (30 mg) by mouth daily Must follow up before med runs out    Attention-deficit hyperactivity disorder, predominantly hyperactive type       * amphetamine-dextroamphetamine 30 MG per tablet    ADDERALL    30 tablet    Take 1 tablet (30 mg) by mouth daily    Attention-deficit hyperactivity disorder, predominantly hyperactive type       * amphetamine-dextroamphetamine 30 MG per tablet    ADDERALL    30 tablet    Take 1 tablet (30 mg) by mouth daily    Attention-deficit hyperactivity disorder, predominantly hyperactive type       buPROPion 300 MG 24 hr tablet    WELLBUTRIN XL    30 tablet    Take 1 tablet (300 mg) by mouth every morning    Anxiety and depression       hydrOXYzine 25 MG tablet    ATARAX    60 tablet    Take 1-2 tablets (25-50 mg) by mouth every 8 hours as needed for anxiety (insomnia)    Anxiety and depression       mupirocin 2 % ointment    BACTROBAN    22 g    Apply topically 3 times daily for 5 days    Folliculitis       sulfamethoxazole-trimethoprim 800-160 MG per tablet    BACTRIM DS/SEPTRA DS    20 tablet    Take 1 tablet by mouth 2 times daily for 10 days    Folliculitis       venlafaxine 150 MG 24 hr capsule    EFFEXOR-XR    90 capsule    TAKE ONE CAPSULE BY MOUTH ONCE DAILY    Anxiety and depression       * Notice:  This list has 3 medication(s) that are the same as other medications prescribed for you. Read the directions carefully, and ask your doctor or other care provider to review them with you.

## 2018-09-09 NOTE — PROGRESS NOTES
[facility] Progress Note        Alfie Pickett MD, MPH  09/09/2018    Marjorie Holbrook is a pleasant 43  year old female seen for a pruritic rash involving bilateral forearms and upper thighs x 1 week . There has not been any change in the diet or new detergent, soap or toiletries.  No new medications, no insect bite. Question of exposure to poison IVY. No fever or chills and no recent illness.  No cough or shortness of breath or wheezing is referred.  No nausea, vomiting or diarrhea.  No arthralgia or myalgia.  No tongue, lip or mouth swelling or swallowing problems are referred.    Physical Exam   /62  Pulse 94  Temp 98.3  F (36.8  C) (Oral)  LMP 08/21/2013  SpO2 99%     Constitutional: Patient is in no distress The patient is pleasant and cooperative.   HEENT: Head:  Head is atraumatic, normocephalic.    Eyes: Pupils are equal, round and reactive to light and accomodation.  Sclera is non-icteric. No conjunctival injection, or exudate noted. Extraocular motion is intact. Visual acuity is intact bilaterally.  Ears:  External acoustic canals are patent and clear.  There is no erythema and bulging( exudate)  of the ( R/L ) tympanic membrane(s ).   Nose:  No Nasal congestion w/o drainage or mucosal ulceration is noted.  Throat:  Oral mucosa is moist.  No oral lesions are noted.  No posterior pharyngeal hyperemia or exudate noted.     Neck Supple.  There is no cervical lymphadenopathy.  No nuchal rigidity noted.  There is no meningismus.     Cardiovascular: Heart is regular to rate and rhythm.  No murmur is noted.     Chest. Chest Symmetrical, no soft tissues, swelling, or tenderness upon palpation   Lungs: Clear in the anterior and posterior pulmonary fields.   Abdomen: Soft and non-tender.    Back No flank tenderness is noted.   Extremeties No edema, no calf tenderness.   Neuro: No focal deficit.   Skin No petechiae or purpura is noted.  There is multiple perifollicular areas of erythema and  skin excoriation of bilateral forearms without vesicle or pustules or exudate. No abscess formation.No crusty skin lesions noted. No dermatomal and no serpiginous patterns of skin involvement.   Mood Normal         Assessment & Plan      Folliculitis of forearms and upper legs and thighs:  Bactrim DS po BID x 10 days ,No refills.  Bactroban ointment topically TID; 22 gram tube and a refill.    Follow-up with your PCP in 2 days, earlier if symptoms worsen.    Advised to avoid hot baths/showers.  Advised to wash the skin w an antibacterial soap and water daily..  Avoid warm environments.  Seek medical attention if there is any respiratory stress.  Use Benedryl every 8 hours as needed for pruritis ( discussed the sedative nature of benadryl and advised patient to avoid operating equipment/machinery and caution with driving is advised ).

## 2018-09-10 ENCOUNTER — TELEPHONE (OUTPATIENT)
Dept: DERMATOLOGY | Facility: CLINIC | Age: 43
End: 2018-09-10

## 2018-09-10 ENCOUNTER — OFFICE VISIT (OUTPATIENT)
Dept: DERMATOLOGY | Facility: CLINIC | Age: 43
End: 2018-09-10
Payer: COMMERCIAL

## 2018-09-10 DIAGNOSIS — L23.7 ALLERGIC CONTACT DERMATITIS DUE TO PLANT: Primary | ICD-10-CM

## 2018-09-10 PROCEDURE — 99202 OFFICE O/P NEW SF 15 MIN: CPT | Performed by: DERMATOLOGY

## 2018-09-10 RX ORDER — HYDROXYZINE HYDROCHLORIDE 25 MG/1
25 TABLET, FILM COATED ORAL 3 TIMES DAILY PRN
Qty: 120 TABLET | Refills: 0 | Status: SHIPPED | OUTPATIENT
Start: 2018-09-10 | End: 2018-12-21

## 2018-09-10 RX ORDER — PREDNISONE 5 MG/1
TABLET ORAL
Qty: 63 TABLET | Refills: 0 | Status: SHIPPED | OUTPATIENT
Start: 2018-09-10 | End: 2018-12-07

## 2018-09-10 NOTE — MR AVS SNAPSHOT
After Visit Summary   9/10/2018    Marjorie Holbrook    MRN: [id number]           Patient Information     Date Of Birth          1975        Visit Information        Provider Department      9/10/2018 11:30 AM Ishmael Rodriguez MD [facility]        Today's Diagnoses     Allergic contact dermatitis due to plant    -  1      Care Instructions    Hydroxyzine (anti-itch) medication can make you drowsy. If you get too drowsy with 25mg, please cut the pills in half. Do not drive until you know how it will treat you.   Recommend a tapering course of prednisone to help with patients acute symptom:  40 mg daily x 3 days, then 30 mg x 3 days, then 20 mg x 3 days, then 10 mg daily x3 days, then 5mg daily x3 day. Possible side effects include increased appetite, weight gain, edema, hypertension, elevated blood sugar levels, changes in mood, osteopenia/osteoporosis, Cushing's disease and risks associated with abruptly stopping the medication. Take medication with food and wait 30 minutes before laying down.   Please call if  Rash seems to recur. Topical steroids may be helpful if symptoms are localized on recurrence.     The products on the following list have minimal common skin allergens according to patch testing data. A grades have the least, C grades have less than products that do not appear on this list.    Corticosteroids, Generic    Fougera    Desoximetasone Ointment 0.25% A   Desonide Ointment 0.05% A   Betamethasone Valerate Cream 0.1% A   Betamethasone Valerate Ointment 0.1% A   Betamethasone Valerate Lotion 0.1% A   Triamcinolone Acetonide Ointment 0.025% and 0.1% A   Clobetasol Propionate Topical Solution 0.05% A   Betamethasone Dipropionate Cream 0.05% A   Betamethasone Dipropionate Ointment 0.05% A   Betamethasone Dipropionate Lotion 0.05% A   Perrigo    Mometasone Furoate Ointment 0.1% A   Mometasone Furoate Topical Solution 0.1% A   Desonide Ointment 0.05% A   Triamcinolone  "Acetonide Ointment 0.025% and 0.1% A   Betamethasone Dipropionate Lotion 0.05% A   Desonide Cream 0.05% C   Clobetasol Propionate Shampoo 0.05% C   Taro    Desoximetasone Cream 0.05% A   Desoximetasone Ointment 0.05% A   Hydrocortisone Butyrate Cream 0.1% A   Hydrocortisone Butyrate Ointment 0.1% A   Hydrocortisone Butyrate Solution 0.1% A   Desoximetasone Gel 0.05% A   Desoximetasone Ointment 0.25% A   Desonide Ointment 0.05% A   Triamcinolone Acetonide Dental Paste 0.1% A   Clobetasol Propionate Topical Solution 0.05% A   Desonide Cream 0.05% B   Triamcinolone Acetonide Ointment 0.1% B       Miscellaneous Rx Medications    Carmol 40 - Urea Gel A   Collagenase Santyl Ointment A   Protopic 0.03% or 0.1% Ointment A   Vaniqa Cream B       Antiperspirant/Deodorant    Certain Dri Clinical Strength Roll-on Antiperspirant A   Crystal Body Deodorant Stick A   Crystal Roll-On Body Deodorant A   Nati Roll-On Unscented Antiperspirant & Deodorant A   Speed Stick Power Unscented Antiperspirant & Deodorant Solid A       Hair Conditioners    Cleure Replenishing Conditioner A   DHS Conditioning Rinse with Panthenol A   Magick Botanicals Spray-On Detangler and Conditioner A   VMV Hypoallergenics Essence Skin-Saving Conditioner A   Magick Botanicals Oil Free Conditioner B       Hair Shampoos    VMV Essence Skin-Saving Robert Wash Hair + Body \"Big Softie\" Shampoo A   VMV Essence Skin-Saving Superwash Hair + Body Milk Shampoo A   AFM SafeChoice Shampoo & Body Wash B   Cleure Volumizing Shampoo SLS Free C   DHS Sal Shampoo C   Free & Clear Shampoo for Sensitive Skin & Scalp C   Magick Botanicals Oil Free Shampoo C       Moisturizers    Aveeno Baby Eczema Therapy Moisturizing Cream, Fragrance Free A   Aveeno Eczema Therapy Moisturizing Cream A   Cetaphil Oil Control Moisturizer SPF 30 A   Neosporin Eczema Essentials Daily Moisturizing Cream A   Olay Total Effects 7-in-1 Tone Correcting Eye Treatment A   Vaniply Ointment for Sensitive " Skin A   Active Naturals Daily Moisturizing Lotion B   CeraVe A.M. Facial Moisturizing Lotion SPF 30 B   CeraVe Moisturizing Cream B   CeraVe Moisturizing Lotion B   CeraVe P.M. Facial Moisturizing Lotion B   CeraVe Renewing Lotion B   CeraVe Therapeutic Hand Cream B   CeraVe Renewing SA Cream B   Eucerin Intensive Repair Extra-Enriched Hand Crème B   Lubriderm Daily Moisture Lotion Fragrance Free B   Neutrogena Norwegian Formula Hand Cream Fragrance Free B   Vaseline Intensive Rescue Repairing Fragrance Free Lotion B   Eucerin Professional Repair Extremely Dry Skin Lotion C   Eucerin Smoothing Repair Dry Skin Lotion C       Soaps/Cleansers    Aveeno Baby Cleansing Therapy Moisturizing Wash, Fragrance Free A   Aveeno Active Naturals Moisturizing Bar A   Free & Clear Liquid Cleanser for Sensitive Skin A   Neutrogena Transparent Facial Bar Fragrance Free A   CeraVe Hydrating Cleanser B   Eucerin Redness Relief Soothing Cleanser C   Neutrogena Ultra Gentle Hydrating Cleanser C   PanOxyl Acne Cleansing Bar 10% Benzoyl Peroxide C   Vanicream Gentle Facial Cleanser C       Sunscreens    Banana Boat Natural Reflect Sunscreen Lotion SPF 50 A   Banana Boat Kids Sunscreen Lotion SPF 50 A   Elta MD UV AERO Full-Body Sunscreen Freistatt SPF 45 A   Elta MD UV Pure Face and Body Physical Sunscreen SPF 47 A   Neutrogena Pure and Free Baby Faces Ultra Gentle Sunblock SPF 50 A   Vanicream Sensitive Skin Sunscreen SPF 30 A   Vanicream Sensitive Skin Sunscreen SPF 60 A   Vanicream Sunscreen Sport SPF 35 A   Vanicream Sunscreen SPF 30 A   Aveeno Baby Continuous Protection Lotion Sunscreen SPF 55 B   CeraVe Wet Skin Spray Sunscreen SPF 50 B   L'Oreal Sublime Sun Advanced Sunscreen Lotion SPF 50 B   L'Oreal Subline Sun Liquid Silk Sunscreen Lotion for Face SPF 50 B   Neutrogena Sensitive Skin Sunblock Lotion SPF 30 B   Banana Boat Protect and Hydrate Sunscreen Lotion SPF 30 or 50 C       Laundry Detergents    7th Generation Free & Clear  Natural Laundry Detergent A   7th Generation Free & Clear Natural Laundry Detergent Packs A   2X Ultra Cheer BrightClean High Efficiency Liquid Detergent, Free & Gentle A   Cheer BightClean Powder Detergent, Free & Gentle A   Ultra Tide Free Powdered Detergent A                 Managing a Poison Ivy, Poison Oak, or Poison Sumac Reaction  If you come in contact with urushiol    If you think you may have come in contact with the sap oil (called urushiol) contained in poison ivy, poison oak, or poison sumac plants, wash the affected part of your skin. Do this within 15 minutes after contact. Use water or preferably, soap and water.  Undress, and wash your clothes and gear as soon as you can. Be sure to wash any pet that was with you. Taking these steps can help prevent spreading sap oil to someone else. If you have a rash, but are not sure if it is from one of these plants, see your healthcare provider.  To soothe the itching  Your skin may react to poison oak, poison ivy, and poison sumac within hours to a few days after contact. Once you have come into contact with these plants, you can t stop the reaction. But you can take these steps to soothe the itching:    Don t scratch or scrub your rash. Not even if the itching is severe. Scratching can lead to infection.    Bathe in lukewarm (not hot) water. Or take short cool showers to relieve the itching. For a more soothing bath, add oatmeal to the water.    Use antihistamines that are taken by mouth. These include diphenhydramine. You can buy these at the pharmacy. Talk to your healthcare provider or pharmacist for more information on oral antihistamines.    Use over-the-counter treatments on your skin. These include cortisone and calamine lotion.  How your skin may react  A mild rash may become red, swollen, and itchy. The rash may form a line on your skin where you brushed against the plant. If you have a severe rash, your itching may worsen. And your rash may blister  and ooze. If this happens, seek medical care. The fluid from your blisters will not make your rash spread. With or without medical care, your rash may last up to 3 weeks. In the future, try to avoid coming in contact with these plants.  When to call your healthcare provider  Call your healthcare provider if:    Your rash is severe    The rash spreads beyond the exposed part of your body or affects your face.    The rash does not clear up within a few weeks  You may be given medicine to take by mouth or apply directly on the skin.  Call 911  Call 911 if you have any of the following:    Trouble breathing or swallowing    Any significant swelling  Date Last Reviewed: 3/1/2017    0131-3923 [employer]. [address] [place] [address], [place] [id number]. All rights reserved. This information is not intended as a substitute for professional medical care. Always follow your healthcare professional's instructions.                Follow-ups after your visit        Your next 10 appointments already scheduled     Sep 10, 2018 11:30 AM CDT   [facility] Visit with Ishmael Rodriguez MD   [facility]    [address] [place] [id number] [phone]            Sep 10, 2018  4:00 PM CDT   SHORT with Anne Marks PA-C   [facility]    [address] [place] [address] [id number] [phone]              Who to contact     If you have questions or need follow up information about today's clinic visit or your schedule please contact [facility] directly at [phone].  Normal or non-critical lab and imaging results will be communicated to you by MyChart, letter or phone within 4 business days after the clinic has received the results. If you do not hear from us within 7 days, please contact the clinic through MyChart or phone. If you have a critical or abnormal lab result, we will notify you  by phone as soon as possible.  Submit refill requests through [employer] or call your pharmacy and they will forward the refill request to us. Please allow 3 business days for your refill to be completed.          Additional Information About Your Visit        [employer] Information     [employer] gives you secure access to your electronic health record. If you see a primary care provider, you can also send messages to your care team and make appointments. If you have questions, please call your primary care clinic.  If you do not have a primary care provider, please call [phone] and they will assist you.      [employer] is an electronic gateway that provides easy, online access to your medical records. With [employer], you can request a clinic appointment, read your test results, renew a prescription or communicate with your care team.     To access your existing account, please contact your [facility] or call [phone] for assistance.        Care EveryWhere ID     This is your Care EveryWhere ID. This could be used by other organizations to access your [place] medical records  [id number]        Your Vitals Were     Last Period                   08/21/2013            Blood Pressure from Last 3 Encounters:   09/09/18 109/62   06/18/18 102/60   07/06/17 112/68    Weight from Last 3 Encounters:   06/18/18 55 kg (121 lb 3.2 oz)   07/06/17 55.1 kg (121 lb 8 oz)   04/27/17 54 kg (119 lb)              Today, you had the following     No orders found for display         Today's Medication Changes          These changes are accurate as of 9/10/18 11:24 AM.  If you have any questions, ask your nurse or doctor.               Start taking these medicines.        Dose/Directions    predniSONE 5 MG tablet   Commonly known as:  DELTASONE   Used for:  Allergic contact dermatitis due to plant   Started by:  Ishmael Rodriguez MD        Take 40mg by mouth x3 days, then 30mg x3 days, then 20mg x3 days, then  10mg x3 days, then 5mg x3 days, then stop.   Quantity:  63 tablet   Refills:  0         These medicines have changed or have updated prescriptions.        Dose/Directions    * hydrOXYzine 25 MG tablet   Commonly known as:  ATARAX   This may have changed:  Another medication with the same name was added. Make sure you understand how and when to take each.   Used for:  Anxiety and depression   Changed by:  Ishmael Rodriguez MD        Dose:  25-50 mg   Take 1-2 tablets (25-50 mg) by mouth every 8 hours as needed for anxiety (insomnia)   Quantity:  60 tablet   Refills:  1       * hydrOXYzine 25 MG tablet   Commonly known as:  ATARAX   This may have changed:  You were already taking a medication with the same name, and this prescription was added. Make sure you understand how and when to take each.   Used for:  Allergic contact dermatitis due to plant   Changed by:  Ishmael Rodriguez MD        Dose:  25 mg   Take 1 tablet (25 mg) by mouth 3 times daily as needed for itching   Quantity:  120 tablet   Refills:  0       * Notice:  This list has 2 medication(s) that are the same as other medications prescribed for you. Read the directions carefully, and ask your doctor or other care provider to review them with you.         Where to get your medicines      These medications were sent to [address] - [address]  [address], [facility] [address]     Phone:  [phone]     hydrOXYzine 25 MG tablet    predniSONE 5 MG tablet                Primary Care Provider Office Phone # Fax #    Ovidio Jenkins Mai, [phone]       [address] [facility]   [facility] [address]        Equal Access to Services     [facility]: Hadii paolo diaz hadasho Sofranceali, waaxda luqadaha, qaybta kaalmada adeegyada, shorty dc. So [facility] [phone].    ATENCIÓN: Si habla español, tiene a velasquez disposición servicios gratuitos de asistencia lingüística. Llame al [phone].    We comply with applicable  federal civil rights laws and [place] laws. We do not discriminate on the basis of race, color, national origin, age, disability, sex, sexual orientation, or gender identity.            Thank you!     Thank you for choosing [facility]  for your care. Our goal is always to provide you with excellent care. Hearing back from our patients is one way we can continue to improve our services. Please take a few minutes to complete the written survey that you may receive in the mail after your visit with us. Thank you!             Your Updated Medication List - Protect others around you: Learn how to safely use, store and throw away your medicines at www.disposemymeds.org.          This list is accurate as of 9/10/18 11:24 AM.  Always use your most recent med list.                   Brand Name Dispense Instructions for use Diagnosis    * amphetamine-dextroamphetamine 30 MG per tablet    ADDERALL    30 tablet    Take 1 tablet (30 mg) by mouth daily Must follow up before med runs out    Attention-deficit hyperactivity disorder, predominantly hyperactive type       * amphetamine-dextroamphetamine 30 MG per tablet    ADDERALL    30 tablet    Take 1 tablet (30 mg) by mouth daily    Attention-deficit hyperactivity disorder, predominantly hyperactive type       * amphetamine-dextroamphetamine 30 MG per tablet    ADDERALL    30 tablet    Take 1 tablet (30 mg) by mouth daily    Attention-deficit hyperactivity disorder, predominantly hyperactive type       buPROPion 300 MG 24 hr tablet    WELLBUTRIN XL    30 tablet    Take 1 tablet (300 mg) by mouth every morning    Anxiety and depression       * hydrOXYzine 25 MG tablet    ATARAX    60 tablet    Take 1-2 tablets (25-50 mg) by mouth every 8 hours as needed for anxiety (insomnia)    Anxiety and depression       * hydrOXYzine 25 MG tablet    ATARAX    120 tablet    Take 1 tablet (25 mg) by mouth 3 times daily as needed for itching    Allergic contact dermatitis due  to plant       mupirocin 2 % ointment    BACTROBAN    22 g    Apply topically 3 times daily for 5 days    Folliculitis       predniSONE 5 MG tablet    DELTASONE    63 tablet    Take 40mg by mouth x3 days, then 30mg x3 days, then 20mg x3 days, then 10mg x3 days, then 5mg x3 days, then stop.    Allergic contact dermatitis due to plant       sulfamethoxazole-trimethoprim 800-160 MG per tablet    BACTRIM DS/SEPTRA DS    20 tablet    Take 1 tablet by mouth 2 times daily for 10 days    Folliculitis       venlafaxine 150 MG 24 hr capsule    EFFEXOR-XR    90 capsule    TAKE ONE CAPSULE BY MOUTH ONCE DAILY    Anxiety and depression       * Notice:  This list has 5 medication(s) that are the same as other medications prescribed for you. Read the directions carefully, and ask your doctor or other care provider to review them with you.

## 2018-09-10 NOTE — NURSING NOTE
Marjorie Holbrook's goals for this visit include:   Chief Complaint   Patient presents with     Derm Problem     rash on arms and legs and thigh, week ago sunday, bactrim       She requests these members of her care team be copied on today's visit information: yes    PCP: Ovidio Toribio    Referring Provider:  No referring provider defined for this encounter.    LMP 08/21/2013    Do you need any medication refills at today's visit? No     Amorrae OBDULIO Bashir

## 2018-09-10 NOTE — PATIENT INSTRUCTIONS
Hydroxyzine (anti-itch) medication can make you drowsy. If you get too drowsy with 25mg, please cut the pills in half. Do not drive until you know how it will treat you.   Recommend a tapering course of prednisone to help with patients acute symptom:  40 mg daily x 3 days, then 30 mg x 3 days, then 20 mg x 3 days, then 10 mg daily x3 days, then 5mg daily x3 day. Possible side effects include increased appetite, weight gain, edema, hypertension, elevated blood sugar levels, changes in mood, osteopenia/osteoporosis, Cushing's disease and risks associated with abruptly stopping the medication. Take medication with food and wait 30 minutes before laying down.   Please call if  Rash seems to recur. Topical steroids may be helpful if symptoms are localized on recurrence.     The products on the following list have minimal common skin allergens according to patch testing data. A grades have the least, C grades have less than products that do not appear on this list.    Corticosteroids, Generic    Fougera    Desoximetasone Ointment 0.25% A   Desonide Ointment 0.05% A   Betamethasone Valerate Cream 0.1% A   Betamethasone Valerate Ointment 0.1% A   Betamethasone Valerate Lotion 0.1% A   Triamcinolone Acetonide Ointment 0.025% and 0.1% A   Clobetasol Propionate Topical Solution 0.05% A   Betamethasone Dipropionate Cream 0.05% A   Betamethasone Dipropionate Ointment 0.05% A   Betamethasone Dipropionate Lotion 0.05% A   Perrigo    Mometasone Furoate Ointment 0.1% A   Mometasone Furoate Topical Solution 0.1% A   Desonide Ointment 0.05% A   Triamcinolone Acetonide Ointment 0.025% and 0.1% A   Betamethasone Dipropionate Lotion 0.05% A   Desonide Cream 0.05% C   Clobetasol Propionate Shampoo 0.05% C   Taro    Desoximetasone Cream 0.05% A   Desoximetasone Ointment 0.05% A   Hydrocortisone Butyrate Cream 0.1% A   Hydrocortisone Butyrate Ointment 0.1% A   Hydrocortisone Butyrate Solution 0.1% A   Desoximetasone Gel 0.05% A  "  Desoximetasone Ointment 0.25% A   Desonide Ointment 0.05% A   Triamcinolone Acetonide Dental Paste 0.1% A   Clobetasol Propionate Topical Solution 0.05% A   Desonide Cream 0.05% B   Triamcinolone Acetonide Ointment 0.1% B       Miscellaneous Rx Medications    Carmol 40 - Urea Gel A   Collagenase Santyl Ointment A   Protopic 0.03% or 0.1% Ointment A   Vaniqa Cream B       Antiperspirant/Deodorant    Certain Dri Clinical Strength Roll-on Antiperspirant A   Crystal Body Deodorant Stick A   Crystal Roll-On Body Deodorant A   Nati Roll-On Unscented Antiperspirant & Deodorant A   Speed Stick Power Unscented Antiperspirant & Deodorant Solid A       Hair Conditioners    Cleure Replenishing Conditioner A   DHS Conditioning Rinse with Panthenol A   Magick Botanicals Spray-On Detangler and Conditioner A   VMV Hypoallergenics Essence Skin-Saving Conditioner A   Magick Botanicals Oil Free Conditioner B       Hair Shampoos    VMV Essence Skin-Saving Robert Wash Hair + Body \"Big Softie\" Shampoo A   VMV Essence Skin-Saving Superwash Hair + Body Milk Shampoo A   AFM SafeChoice Shampoo & Body Wash B   Cleure Volumizing Shampoo SLS Free C   DHS Sal Shampoo C   Free & Clear Shampoo for Sensitive Skin & Scalp C   Magick Botanicals Oil Free Shampoo C       Moisturizers    Aveeno Baby Eczema Therapy Moisturizing Cream, Fragrance Free A   Aveeno Eczema Therapy Moisturizing Cream A   Cetaphil Oil Control Moisturizer SPF 30 A   Neosporin Eczema Essentials Daily Moisturizing Cream A   Olay Total Effects 7-in-1 Tone Correcting Eye Treatment A   Vaniply Ointment for Sensitive Skin A   Active Naturals Daily Moisturizing Lotion B   CeraVe A.M. Facial Moisturizing Lotion SPF 30 B   CeraVe Moisturizing Cream B   CeraVe Moisturizing Lotion B   CeraVe P.M. Facial Moisturizing Lotion B   CeraVe Renewing Lotion B   CeraVe Therapeutic Hand Cream B   CeraVe Renewing SA Cream B   Eucerin Intensive Repair Extra-Enriched Hand Crème B   Lubriderm Daily " Moisture Lotion Fragrance Free B   Neutrogena Norwegian Formula Hand Cream Fragrance Free B   Vaseline Intensive Rescue Repairing Fragrance Free Lotion B   Eucerin Professional Repair Extremely Dry Skin Lotion C   Eucerin Smoothing Repair Dry Skin Lotion C       Soaps/Cleansers    Aveeno Baby Cleansing Therapy Moisturizing Wash, Fragrance Free A   Aveeno Active Naturals Moisturizing Bar A   Free & Clear Liquid Cleanser for Sensitive Skin A   Neutrogena Transparent Facial Bar Fragrance Free A   CeraVe Hydrating Cleanser B   Eucerin Redness Relief Soothing Cleanser C   Neutrogena Ultra Gentle Hydrating Cleanser C   PanOxyl Acne Cleansing Bar 10% Benzoyl Peroxide C   Vanicream Gentle Facial Cleanser C       Sunscreens    Banana Boat Natural Reflect Sunscreen Lotion SPF 50 A   Banana Boat Kids Sunscreen Lotion SPF 50 A   Elta MD UV AERO Full-Body Sunscreen [place] SPF 45 A   Elta MD UV Pure Face and Body Physical Sunscreen SPF 47 A   Neutrogena Pure and Free Baby Faces Ultra Gentle Sunblock SPF 50 A   Vanicream Sensitive Skin Sunscreen SPF 30 A   Vanicream Sensitive Skin Sunscreen SPF 60 A   Vanicream Sunscreen Sport SPF 35 A   Vanicream Sunscreen SPF 30 A   Aveeno Baby Continuous Protection Lotion Sunscreen SPF 55 B   CeraVe Wet Skin Spray Sunscreen SPF 50 B   L'Oreal Sublime Sun Advanced Sunscreen Lotion SPF 50 B   L'Oreal Subline Sun Liquid Silk Sunscreen Lotion for Face SPF 50 B   Neutrogena Sensitive Skin Sunblock Lotion SPF 30 B   Banana Boat Protect and Hydrate Sunscreen Lotion SPF 30 or 50 C       Laundry Detergents    7th Generation Free & Clear Natural Laundry Detergent A   7th Generation Free & Clear Natural Laundry Detergent Packs A   2X Ultra Cheer BrightClean High Efficiency Liquid Detergent, Free & Gentle A   Cheer BightClean Powder Detergent, Free & Gentle A   Ultra Tide Free Powdered Detergent A                 Managing a Poison Ivy, Poison Oak, or Poison Sumac Reaction  If you come in contact with  urushiol    If you think you may have come in contact with the sap oil (called urushiol) contained in poison ivy, poison oak, or poison sumac plants, wash the affected part of your skin. Do this within 15 minutes after contact. Use water or preferably, soap and water.  Undress, and wash your clothes and gear as soon as you can. Be sure to wash any pet that was with you. Taking these steps can help prevent spreading sap oil to someone else. If you have a rash, but are not sure if it is from one of these plants, see your healthcare provider.  To soothe the itching  Your skin may react to poison oak, poison ivy, and poison sumac within hours to a few days after contact. Once you have come into contact with these plants, you can t stop the reaction. But you can take these steps to soothe the itching:    Don t scratch or scrub your rash. Not even if the itching is severe. Scratching can lead to infection.    Bathe in lukewarm (not hot) water. Or take short cool showers to relieve the itching. For a more soothing bath, add oatmeal to the water.    Use antihistamines that are taken by mouth. These include diphenhydramine. You can buy these at the pharmacy. Talk to your healthcare provider or pharmacist for more information on oral antihistamines.    Use over-the-counter treatments on your skin. These include cortisone and calamine lotion.  How your skin may react  A mild rash may become red, swollen, and itchy. The rash may form a line on your skin where you brushed against the plant. If you have a severe rash, your itching may worsen. And your rash may blister and ooze. If this happens, seek medical care. The fluid from your blisters will not make your rash spread. With or without medical care, your rash may last up to 3 weeks. In the future, try to avoid coming in contact with these plants.  When to call your healthcare provider  Call your healthcare provider if:    Your rash is severe    The rash spreads beyond the  exposed part of your body or affects your face.    The rash does not clear up within a few weeks  You may be given medicine to take by mouth or apply directly on the skin.  Call 911  Call 911 if you have any of the following:    Trouble breathing or swallowing    Any significant swelling  Date Last Reviewed: 3/1/2017    5342-6446 [employer]. [address] [place] [address]. All rights reserved. This information is not intended as a substitute for professional medical care. Always follow your healthcare professional's instructions.

## 2018-09-10 NOTE — TELEPHONE ENCOUNTER
[facility] Call Center    Phone Message    May a detailed message be left on voicemail: yes    Reason for Call: pt would like a same day appointment as soon as today or this week. Pt stated she went to urgent care yesterday and provider told her to see a derm provider as soon as possible. Pt does not want to wait for appt in OCT, pt is very concern and if she can not be seen this week, please give her a call. (Pt will be waiting for the call today.)    Action Taken: Message routed to:  Adult Clinics: Dermatology p [phone]

## 2018-09-10 NOTE — LETTER
9/10/2018         RE: Marjorie Holbrook  [address] [facility] [address]        Dear Colleague,    Thank you for referring your patient, Marjorie Holbrook, to the [facility]. Please see a copy of my visit note below.    [facility] Dermatology Note      Dermatology Problem List:  1. Rash consistent with ACD to plant material    CC:   Chief Complaint   Patient presents with     Derm Problem     rash on arms and legs and thigh, week ago sunday, bactrim       Encounter Date: Sep 10, 2018    History of Present Illness:  Ms. Marjorie Holbrook is a 43 year old female who presents for evaluation of a worsening rash on her arms, abdomen, and right thigh.     She notes the rash first appeared 1 week ago, the same day as she had been clearing some brush. She was wearing short sleeves and her arms came into contact with the plant material. She was wearing shorts, but her legs didn't really contact the foliage. The rash is very itchy, keeping her up at night. It had transparent yellow liquid within small blisters. It started on her right arm, but seems to be spreading to her right thigh and abdomen.     She has not been in a hot tub or noticed milky discharge from any of the sites.     She went to urgent care and was diagnosed with folliculitis. She was prescribed Bactrim and Mupirocin.     Past Medical History:   Patient Active Problem List   Diagnosis     Menorrhagia     Dysmenorrhea     Iron deficiency anemia     Tobacco use disorder     Anxiety and depression     Attention-deficit hyperactivity disorder, predominantly hyperactive type     H/O hysterectomy for benign disease     Past Medical History:   Diagnosis Date     Arthritis     maternal grandmother     ASCUS favoring benign 10/9/14     Asthma      Congestive heart failure, unspecified     maternal grandfather     COPD (chronic obstructive pulmonary disease) (H)     maternal grandfather and mother     CVA (cerebral infarction)      maternal grandfather     Hypertension     maternal grandmother     Pyelonephritis 10/2011     Recurrent major depression in partial remission (H) 2/17/2010     Thyroid disease     mother     Past Surgical History:   Procedure Laterality Date     ABDOMEN SURGERY      recent surgery see records     BIOPSY      recent see records     COLONOSCOPY N/A 10/27/2014    Procedure: COLONOSCOPY;  Surgeon: Rashawn Mayer MD;  [facility]     CYSTOSCOPY N/A 4/7/2015    Procedure: CYSTOSCOPY;  Surgeon: Rk العلي MD;  [facility]     DILATION AND CURETTAGE, HYSTEROSCOPY, ABLATE ENDOMETRIUM NOVASURE, COMBINED  9/20/2013    Procedure: COMBINED DILATION AND CURETTAGE, HYSTEROSCOPY, ABLATE ENDOMETRIUM NOVASURE;  Hysteroscopy, Novasure Endometrial Ablation, Currettings;  Surgeon: Michaelle Duran MD;  [facility]     LAPAROSCOPIC HYSTERECTOMY TOTAL N/A 4/7/2015    Procedure: LAPAROSCOPIC HYSTERECTOMY TOTAL;  Surgeon: Rk العلي MD;  [facility]     LAPAROSCOPIC HYSTERECTOMY TOTAL       LAPAROSCOPY DIAGNOSTIC (GYN) N/A 12/1/2014    Procedure: LAPAROSCOPY DIAGNOSTIC (GYN);  Surgeon: Rk العلي MD;  [facility]     SALPINGECTOMY Bilateral 4/7/2015    Procedure: SALPINGECTOMY;  Surgeon: Rk العلي MD;  [facility]: [facility]     TONSILLECTOMY  1996     TUBAL LIGATION  2000       Social History:   reports that she has been smoking Cigarettes.  She started smoking about 10 years ago. She has a 4.50 pack-year smoking history. She has never used smokeless tobacco. She reports that she drinks alcohol. She reports that she does not use illicit drugs.    Family History:  Family History   Problem Relation Age of Onset     Thyroid Disease Mother      Alcohol/Drug Father      Respiratory Father      COPD     Depression Father      HEART DISEASE Father      A-Fib     Hypertension Maternal Grandmother      Family History Negative Child         Medications:  Current Outpatient Prescriptions   Medication Sig Dispense Refill     amphetamine-dextroamphetamine (ADDERALL) 30 MG per tablet Take 1 tablet (30 mg) by mouth daily 30 tablet 0     amphetamine-dextroamphetamine (ADDERALL) 30 MG per tablet Take 1 tablet (30 mg) by mouth daily 30 tablet 0     amphetamine-dextroamphetamine (ADDERALL) 30 MG per tablet Take 1 tablet (30 mg) by mouth daily Must follow up before med runs out 30 tablet 0     buPROPion (WELLBUTRIN XL) 300 MG 24 hr tablet Take 1 tablet (300 mg) by mouth every morning 30 tablet 1     hydrOXYzine (ATARAX) 25 MG tablet Take 1 tablet (25 mg) by mouth 3 times daily as needed for itching 120 tablet 0     hydrOXYzine (ATARAX) 25 MG tablet Take 1-2 tablets (25-50 mg) by mouth every 8 hours as needed for anxiety (insomnia) 60 tablet 1     mupirocin (BACTROBAN) 2 % ointment Apply topically 3 times daily for 5 days 22 g 1     predniSONE (DELTASONE) 5 MG tablet Take 40mg by mouth x3 days, then 30mg x3 days, then 20mg x3 days, then 10mg x3 days, then 5mg x3 days, then stop. 63 tablet 0     sulfamethoxazole-trimethoprim (BACTRIM DS/SEPTRA DS) 800-160 MG per tablet Take 1 tablet by mouth 2 times daily for 10 days 20 tablet 0     venlafaxine (EFFEXOR-XR) 150 MG 24 hr capsule TAKE ONE CAPSULE BY MOUTH ONCE DAILY 90 capsule 1     Allergies   Allergen Reactions     Metronidazole Nausea and Vomiting       Review of Systems:  - Constitutional: The patient denies fatigue, fevers, chills, unintended weight loss, and night sweats.  - Skin: As above in HPI. No additional skin concerns.    Physical exam:  Vitals: LMP 08/21/2013  GEN: This is a well developed, well-nourished female in no acute distress, in a pleasant mood.    SKIN: Total skin excluding the undergarment areas was performed. The exam included the head/face, neck, both arms, chest, back, abdomen, both legs, digits and/or nails.   - right arm with multiple linear erosions with hemorrhagic crust.   -  right dorsal forearms and wrist with grouped patch of small vesicles with dipika transparent fluid.   - scattered pink macules and papule on R anterior thigh and right lower quadrant abdomen.   - Right posterior thigh/buttocks - poker chip sized annular pink plaque  - No other lesions of concern on areas examined.     Impression/Plan:  1. Allergic contact dermatitis due to plant material.     Morphology and history consistent with ACD due to plants.     No evidence of follicularly based pustules today.     Treatment options reviewed - steroids the mainstay of treatment, but sometimes symptoms linger.     PO vs topical options reviewed.     Given new areas are appearing, PO prednisone was chosen. Will start 40mg daily and taper over ~2 weeks.     Hydroxyzine prescribed for itch relief, ok to cut in half if drowsiness.     Ok to discontinue Bactrim and Mupirocin.     Patient to call if longer treatment duration is necessary.     CC Dr. Ovidio Toribio on close of this encounter.  Follow-up as needed.     Staff Involved:  Staff Only    Ishmael Rodriguez DO    Department of Dermatology  [facility]: Phone: [phone], Fax:[phone]  [facility]: Phone: [phone], Fax: [phone]    Again, thank you for allowing me to participate in the care of your patient.        Sincerely,        Ishmael Rodriguez MD

## 2018-09-10 NOTE — PROGRESS NOTES
[facility] Dermatology Note      Dermatology Problem List:  1. Rash consistent with ACD to plant material    CC:   Chief Complaint   Patient presents with     Derm Problem     rash on arms and legs and thigh, week ago sunday, bactrim       Encounter Date: Sep 10, 2018    History of Present Illness:  Ms. Marjorie Holbrook is a 43 year old female who presents for evaluation of a worsening rash on her arms, abdomen, and right thigh.     She notes the rash first appeared 1 week ago, the same day as she had been clearing some brush. She was wearing short sleeves and her arms came into contact with the plant material. She was wearing shorts, but her legs didn't really contact the foliage. The rash is very itchy, keeping her up at night. It had transparent yellow liquid within small blisters. It started on her right arm, but seems to be spreading to her right thigh and abdomen.     She has not been in a hot tub or noticed milky discharge from any of the sites.     She went to urgent care and was diagnosed with folliculitis. She was prescribed Bactrim and Mupirocin.     Past Medical History:   Patient Active Problem List   Diagnosis     Menorrhagia     Dysmenorrhea     Iron deficiency anemia     Tobacco use disorder     Anxiety and depression     Attention-deficit hyperactivity disorder, predominantly hyperactive type     H/O hysterectomy for benign disease     Past Medical History:   Diagnosis Date     Arthritis     maternal grandmother     ASCUS favoring benign 10/9/14     Asthma      Congestive heart failure, unspecified     maternal grandfather     COPD (chronic obstructive pulmonary disease) (H)     maternal grandfather and mother     CVA (cerebral infarction)     maternal grandfather     Hypertension     maternal grandmother     Pyelonephritis 10/2011     Recurrent major depression in partial remission (H) 2/17/2010     Thyroid disease     mother     Past Surgical History:   Procedure Laterality  Date     ABDOMEN SURGERY      recent surgery see records     BIOPSY      recent see records     COLONOSCOPY N/A 10/27/2014    Procedure: COLONOSCOPY;  Surgeon: Rashawn Mayer MD;  [facility]     CYSTOSCOPY N/A 4/7/2015    Procedure: CYSTOSCOPY;  Surgeon: Rk العلي MD;  [facility]     DILATION AND CURETTAGE, HYSTEROSCOPY, ABLATE ENDOMETRIUM NOVASURE, COMBINED  9/20/2013    Procedure: COMBINED DILATION AND CURETTAGE, HYSTEROSCOPY, ABLATE ENDOMETRIUM NOVASURE;  Hysteroscopy, Novasure Endometrial Ablation, Currettings;  Surgeon: Michaelle Duran MD;  [facility]     LAPAROSCOPIC HYSTERECTOMY TOTAL N/A 4/7/2015    Procedure: LAPAROSCOPIC HYSTERECTOMY TOTAL;  Surgeon: Rk العلي MD;  [facility]     LAPAROSCOPIC HYSTERECTOMY TOTAL       LAPAROSCOPY DIAGNOSTIC (GYN) N/A 12/1/2014    Procedure: LAPAROSCOPY DIAGNOSTIC (GYN);  Surgeon: Rk العلي MD;  [facility]     SALPINGECTOMY Bilateral 4/7/2015    Procedure: SALPINGECTOMY;  Surgeon: Rk العلي MD;  [facility]     TONSILLECTOMY  1996     TUBAL LIGATION  2000       Social History:   reports that she has been smoking Cigarettes.  She started smoking about 10 years ago. She has a 4.50 pack-year smoking history. She has never used smokeless tobacco. She reports that she drinks alcohol. She reports that she does not use illicit drugs.    Family History:  Family History   Problem Relation Age of Onset     Thyroid Disease Mother      Alcohol/Drug Father      Respiratory Father      COPD     Depression Father      HEART DISEASE Father      A-Fib     Hypertension Maternal Grandmother      Family History Negative Child        Medications:  Current Outpatient Prescriptions   Medication Sig Dispense Refill     amphetamine-dextroamphetamine (ADDERALL) 30 MG per tablet Take 1 tablet (30 mg) by mouth daily 30 tablet 0     amphetamine-dextroamphetamine (ADDERALL) 30 MG per tablet Take 1  tablet (30 mg) by mouth daily 30 tablet 0     amphetamine-dextroamphetamine (ADDERALL) 30 MG per tablet Take 1 tablet (30 mg) by mouth daily Must follow up before med runs out 30 tablet 0     buPROPion (WELLBUTRIN XL) 300 MG 24 hr tablet Take 1 tablet (300 mg) by mouth every morning 30 tablet 1     hydrOXYzine (ATARAX) 25 MG tablet Take 1 tablet (25 mg) by mouth 3 times daily as needed for itching 120 tablet 0     hydrOXYzine (ATARAX) 25 MG tablet Take 1-2 tablets (25-50 mg) by mouth every 8 hours as needed for anxiety (insomnia) 60 tablet 1     mupirocin (BACTROBAN) 2 % ointment Apply topically 3 times daily for 5 days 22 g 1     predniSONE (DELTASONE) 5 MG tablet Take 40mg by mouth x3 days, then 30mg x3 days, then 20mg x3 days, then 10mg x3 days, then 5mg x3 days, then stop. 63 tablet 0     sulfamethoxazole-trimethoprim (BACTRIM DS/SEPTRA DS) 800-160 MG per tablet Take 1 tablet by mouth 2 times daily for 10 days 20 tablet 0     venlafaxine (EFFEXOR-XR) 150 MG 24 hr capsule TAKE ONE CAPSULE BY MOUTH ONCE DAILY 90 capsule 1     Allergies   Allergen Reactions     Metronidazole Nausea and Vomiting       Review of Systems:  - Constitutional: The patient denies fatigue, fevers, chills, unintended weight loss, and night sweats.  - Skin: As above in HPI. No additional skin concerns.    Physical exam:  Vitals: LMP 08/21/2013  GEN: This is a well developed, well-nourished female in no acute distress, in a pleasant mood.    SKIN: Total skin excluding the undergarment areas was performed. The exam included the head/face, neck, both arms, chest, back, abdomen, both legs, digits and/or nails.   - right arm with multiple linear erosions with hemorrhagic crust.   - right dorsal forearms and wrist with grouped patch of small vesicles with dipika transparent fluid.   - scattered pink macules and papule on R anterior thigh and right lower quadrant abdomen.   - Right posterior thigh/buttocks - poker chip sized annular pink plaque  -  No other lesions of concern on areas examined.     Impression/Plan:  1. Allergic contact dermatitis due to plant material.     Morphology and history consistent with ACD due to plants.     No evidence of follicularly based pustules today.     Treatment options reviewed - steroids the mainstay of treatment, but sometimes symptoms linger.     PO vs topical options reviewed.     Given new areas are appearing, PO prednisone was chosen. Will start 40mg daily and taper over ~2 weeks.     Hydroxyzine prescribed for itch relief, ok to cut in half if drowsiness.     Ok to discontinue Bactrim and Mupirocin.     Patient to call if longer treatment duration is necessary.     CC Dr. Ovidio Toribio on close of this encounter.  Follow-up as needed.     Staff Involved:  Staff Only    Ishmael Rodriguez DO    Department of Dermatology  [facility]: Phone: [phone], Fax:[phone]  [facility]: Phone: [phone], Fax: [phone]

## 2018-09-10 NOTE — TELEPHONE ENCOUNTER
"Requested Prescriptions   Pending Prescriptions Disp Refills     venlafaxine (EFFEXOR-XR) 150 MG 24 hr capsule [Pharmacy Med Name: VENLAFAXINE HCL ER 150MG CP24] 90 capsule 1    Last Written Prescription Date:  12/21/2017  Last Fill Quantity: 90,  # refills: 1   Last office visit: 6/18/2018 with prescribing provider:  06/18/2018GARTH   Future Office Visit:   Next 5 appointments (look out 90 days)     Sep 10, 2018  4:00 PM CDT   [facility]-C   [facility]    [address]  [facility] [place] [address]   [phone]                  Sig: TAKE ONE CAPSULE BY MOUTH ONCE DAILY    Serotonin-Norepinephrine Reuptake Inhibitors  Passed    9/9/2018  9:34 AM       Passed - Blood pressure under 140/90 in past 12 months    BP Readings from Last 3 Encounters:   09/09/18 109/62   06/18/18 102/60   07/06/17 112/68                Passed - Recent (12 mo) or future (30 days) visit within the authorizing provider's specialty    Patient had office visit in the last 12 months or has a visit in the next 30 days with authorizing provider or within the authorizing provider's specialty.  See \"Patient Info\" tab in inbasket, or \"Choose Columns\" in Meds & Orders section of the refill encounter.           Passed - Patient is age 18 or older       Passed - No active pregnancy on record       Passed - Normal serum creatinine on file in past 12 months    Recent Labs   Lab Test  06/18/18   1631   CR  0.64            Passed - No positive pregnancy test in past 12 months        hydrOXYzine (ATARAX) 25 MG tablet [Pharmacy Med Name: HYDROXYZINE HCL 25MG TABS] 60 tablet 1    THIS MEDICATION WAS JUST PRESCRIBED TODAY  Sig: TAKE 1-2 TABLETS BY MOUTH EVERY 8 HOURS AS NEEDED FOR ANXIETY ( INSOMNIA).    Antihistamines Protocol Passed    9/9/2018  9:34 AM       Passed - Recent (12 mo) or future (30 days) visit within the authorizing provider's specialty    Patient had office " "visit in the last 12 months or has a visit in the next 30 days with authorizing provider or within the authorizing provider's specialty.  See \"Patient Info\" tab in inbasket, or \"Choose Columns\" in Meds & Orders section of the refill encounter.           Passed - Patient is age 3 or older    Apply age and/or weight-based dosing for peds patients age 3 and older.    Forward request to provider for patients under the age of 3.            "

## 2018-09-11 RX ORDER — VENLAFAXINE HYDROCHLORIDE 150 MG/1
CAPSULE, EXTENDED RELEASE ORAL
Qty: 90 CAPSULE | Refills: 1 | Status: SHIPPED | OUTPATIENT
Start: 2018-09-11 | End: 2019-04-04

## 2018-09-11 RX ORDER — HYDROXYZINE HYDROCHLORIDE 25 MG/1
TABLET, FILM COATED ORAL
Qty: 60 TABLET | Refills: 1 | OUTPATIENT
Start: 2018-09-11

## 2018-09-11 NOTE — TELEPHONE ENCOUNTER
Hydroxyzine denied, refilled on 09/10/2018.    Venlafaxine Routing refill request to provider for review/approval because:  Elevated PHQ 9 score.     Silva Kirby RN     PHQ-9 score:    PHQ-9 SCORE 6/18/2018   Total Score -   Total Score 15

## 2018-11-13 ENCOUNTER — TELEPHONE (OUTPATIENT)
Dept: PHARMACY | Facility: OTHER | Age: 43
End: 2018-11-13

## 2018-11-13 NOTE — TELEPHONE ENCOUNTER
MTM referral from: [facility] recruitment    MTM referral outreach attempt #1 on November 13, 2018 at 3:12 PM      Outcome: Left Message    Ricarda Trevino, MTM Coordinator Intern

## 2018-11-19 ENCOUNTER — TELEPHONE (OUTPATIENT)
Dept: FAMILY MEDICINE | Facility: CLINIC | Age: 43
End: 2018-11-19

## 2018-11-19 DIAGNOSIS — F90.1 ATTENTION-DEFICIT HYPERACTIVITY DISORDER, PREDOMINANTLY HYPERACTIVE TYPE: ICD-10-CM

## 2018-11-19 RX ORDER — DEXTROAMPHETAMINE SACCHARATE, AMPHETAMINE ASPARTATE, DEXTROAMPHETAMINE SULFATE AND AMPHETAMINE SULFATE 7.5; 7.5; 7.5; 7.5 MG/1; MG/1; MG/1; MG/1
30 TABLET ORAL DAILY
Qty: 30 TABLET | Refills: 0 | Status: SHIPPED | OUTPATIENT
Start: 2018-11-19 | End: 2018-12-21

## 2018-11-19 NOTE — TELEPHONE ENCOUNTER
Adderall 30Mg       Last Written Prescription Date:  05/21/2018  Last Fill Quantity: 30,   # refills: 0  Last Office Visit: 06/18/2018  Future Office visit:       Routing refill request to provider for review/approval because:  Drug not on the [facility], [facility] or [facility] refill protocol or controlled substance  Ariana Leblanc MA

## 2018-11-19 NOTE — TELEPHONE ENCOUNTER
Patient is due for a PHQ-9.  Index start date:10/18/2018  Index end date:2/18/2019    Please call patient. Pt is active on [employer]. I have sent a PHQ-9 via [employer] and will postpone the encounter. Ling Davenport CMA ([facility])

## 2018-11-20 NOTE — TELEPHONE ENCOUNTER
Script faxed to [facility] [phone] pharmacy.  Placed up front for    Laura BAR

## 2018-11-20 NOTE — TELEPHONE ENCOUNTER
Pt completed PHQ-9. I will route to triage due to question #9's answer.    Question: 9.  Thoughts that you would be better off dead, or of hurting yourself in some way Answer:   More than half the days          Ling Davenport CMA ([facility])

## 2018-11-23 NOTE — TELEPHONE ENCOUNTER
Patient returned call.  Patient reports that she is not suicidal at this time, however she is feeling overwhelmed.  She is medication compliant, but feeling that she needs a adjustment with her medications.  She does have a upcoming appointment with PCP on 12/07.  She did agree to have Melanie Gutierrez TE reach out to her early next week for possible visit or resources.      Patient educated and expressed verbal understanding of need to seek emergency care for any suicidal thought, plans, or attempts.     Silva Kirby RN

## 2018-11-27 NOTE — TELEPHONE ENCOUNTER
MTM referral from: [facility] recruitment    MTM referral outreach attempt #2 on November 27, 2018 at 4:27 PM      Outcome: Spoke with patient and she would like us to call her back in a couple weeks (after she has an appointment with her PCP).      Ricarda Trevino, MTM Coordinator Intern

## 2018-12-05 NOTE — TELEPHONE ENCOUNTER
MTM referral from: [facility] recruitment    MTM referral outreach attempt #3 on December 5, 2018 at 4:30 PM      Outcome: No Answer    Ricarda Trevino, MTM Coordinator Intern

## 2018-12-07 ENCOUNTER — OFFICE VISIT (OUTPATIENT)
Dept: FAMILY MEDICINE | Facility: CLINIC | Age: 43
End: 2018-12-07
Payer: COMMERCIAL

## 2018-12-07 VITALS
DIASTOLIC BLOOD PRESSURE: 68 MMHG | RESPIRATION RATE: 16 BRPM | HEIGHT: 65 IN | HEART RATE: 110 BPM | BODY MASS INDEX: 22.69 KG/M2 | TEMPERATURE: 98.7 F | SYSTOLIC BLOOD PRESSURE: 112 MMHG | WEIGHT: 136.2 LBS | OXYGEN SATURATION: 100 %

## 2018-12-07 DIAGNOSIS — F33.1 MODERATE RECURRENT MAJOR DEPRESSION (H): Primary | ICD-10-CM

## 2018-12-07 DIAGNOSIS — F90.1 ATTENTION-DEFICIT HYPERACTIVITY DISORDER, PREDOMINANTLY HYPERACTIVE TYPE: ICD-10-CM

## 2018-12-07 DIAGNOSIS — F41.1 GAD (GENERALIZED ANXIETY DISORDER): ICD-10-CM

## 2018-12-07 PROCEDURE — 99214 OFFICE O/P EST MOD 30 MIN: CPT | Performed by: FAMILY MEDICINE

## 2018-12-07 RX ORDER — LAMOTRIGINE 100 MG/1
50 TABLET ORAL 2 TIMES DAILY
Qty: 30 TABLET | Refills: 0 | Status: SHIPPED | OUTPATIENT
Start: 2018-12-07 | End: 2019-01-02

## 2018-12-07 ASSESSMENT — ANXIETY QUESTIONNAIRES
3. WORRYING TOO MUCH ABOUT DIFFERENT THINGS: NEARLY EVERY DAY
GAD7 TOTAL SCORE: 17
2. NOT BEING ABLE TO STOP OR CONTROL WORRYING: NEARLY EVERY DAY
5. BEING SO RESTLESS THAT IT IS HARD TO SIT STILL: SEVERAL DAYS
6. BECOMING EASILY ANNOYED OR IRRITABLE: NEARLY EVERY DAY
IF YOU CHECKED OFF ANY PROBLEMS ON THIS QUESTIONNAIRE, HOW DIFFICULT HAVE THESE PROBLEMS MADE IT FOR YOU TO DO YOUR WORK, TAKE CARE OF THINGS AT HOME, OR GET ALONG WITH OTHER PEOPLE: EXTREMELY DIFFICULT
7. FEELING AFRAID AS IF SOMETHING AWFUL MIGHT HAPPEN: SEVERAL DAYS
1. FEELING NERVOUS, ANXIOUS, OR ON EDGE: NEARLY EVERY DAY

## 2018-12-07 ASSESSMENT — PAIN SCALES - GENERAL: PAINLEVEL: NO PAIN (0)

## 2018-12-07 ASSESSMENT — PATIENT HEALTH QUESTIONNAIRE - PHQ9
SUM OF ALL RESPONSES TO PHQ QUESTIONS 1-9: 21
5. POOR APPETITE OR OVEREATING: NEARLY EVERY DAY

## 2018-12-07 NOTE — MR AVS SNAPSHOT
After Visit Summary   12/7/2018    Marjorie Holbrook    MRN: [id number]           Patient Information     Date Of Birth          1975        Visit Information        Provider Department      12/7/2018 11:20 AM Ovidio Toribio MD [facility]        Today's Diagnoses     Moderate recurrent major depression (H)    -  1    JORDAN (generalized anxiety disorder)        Attention-deficit hyperactivity disorder, predominantly hyperactive type           Follow-ups after your visit        Follow-up notes from your care team     Return in about 2 weeks (around 12/21/2018) for folow up depression, anxiety and ADHD. .      Your next 10 appointments already scheduled     Dec 21, 2018 11:20 AM CST   Office Visit with Ovidio Jenkins Mai, MD   [facility] ([facility]    [address] [place] [id number] [phone]           Bring a current list of meds and any records pertaining to this visit. For Physicals, please bring immunization records and any forms needing to be filled out. Please arrive 10 minutes early to complete paperwork.              Who to contact     If you have questions or need follow up information about today's clinic visit or your schedule please contact [facility] directly at [phone].  Normal or non-critical lab and imaging results will be communicated to you by MyChart, letter or phone within 4 business days after the clinic has received the results. If you do not hear from us within 7 days, please contact the clinic through MyChart or phone. If you have a critical or abnormal lab result, we will notify you by phone as soon as possible.  Submit refill requests through [employer] or call your pharmacy and they will forward the refill request to us. Please allow 3 business days for your refill to be completed.          Additional Information About Your Visit        MyChart Information     [employer] gives you secure access to your  "electronic health record. If you see a primary care provider, you can also send messages to your care team and make appointments. If you have questions, please call your primary care clinic.  If you do not have a primary care provider, please call [phone] and they will assist you.        Care EveryWhere ID     This is your Care EveryWhere ID. This could be used by other organizations to access your [place] medical records  [id number]        Your Vitals Were     Pulse Temperature Respirations Height Last Period Pulse Oximetry    110 98.7  F (37.1  C) (Temporal) 16 5' 5\" (1.651 m) 08/21/2013 100%    Breastfeeding? BMI (Body Mass Index)                No 22.66 kg/m2           Blood Pressure from Last 3 Encounters:   12/07/18 112/68   09/09/18 109/62   06/18/18 102/60    Weight from Last 3 Encounters:   12/07/18 136 lb 3.2 oz (61.8 kg)   06/18/18 121 lb 3.2 oz (55 kg)   07/06/17 121 lb 8 oz (55.1 kg)              Today, you had the following     No orders found for display         Today's Medication Changes          These changes are accurate as of 12/7/18 11:59 PM.  If you have any questions, ask your nurse or doctor.               Start taking these medicines.        Dose/Directions    lamoTRIgine 100 MG tablet   Commonly known as:  LAMICTAL   Used for:  JORDAN (generalized anxiety disorder), Moderate recurrent major depression (H)   Started by:  Ovidio Toribio MD        Dose:  50 mg   Take 0.5 tablets (50 mg) by mouth 2 times daily   Quantity:  30 tablet   Refills:  0         Stop taking these medicines if you haven't already. Please contact your care team if you have questions.     buPROPion 300 MG 24 hr tablet   Commonly known as:  WELLBUTRIN XL   Stopped by:  Ovidio Toribio MD                Where to get your medicines      These medications were sent to [facility] - [place], [place] - [address]  [address], [facility] [address]     Phone:  [phone]     lamoTRIgine 100 MG tablet                Primary " Care Provider Office Phone # Fax #    Ovidio Jenkins Mai, [phone] [address] [place] [address]        Equal Access to Services     YASSINERENETTA PASCUAL : Cori paolo diaz dannamoe Soberenice, waaxda luqadaha, qaybta kaalmada julian, shorty amato lamillerwinnie vanda. So [facility] [phone].    ATENCIÓN: Si zeola español, tiene a velasquez disposición servicios gratuitos de asistencia lingüística. Llame al [phone].    We comply with applicable federal civil rights laws and [place] laws. We do not discriminate on the basis of race, color, national origin, age, disability, sex, sexual orientation, or gender identity.            Thank you!     Thank you for choosing [facility]  for your care. Our goal is always to provide you with excellent care. Hearing back from our patients is one way we can continue to improve our services. Please take a few minutes to complete the written survey that you may receive in the mail after your visit with us. Thank you!             Your Updated Medication List - Protect others around you: Learn how to safely use, store and throw away your medicines at www.disposemymeds.org.          This list is accurate as of 12/7/18 11:59 PM.  Always use your most recent med list.                   Brand Name Dispense Instructions for use Diagnosis    amphetamine-dextroamphetamine 30 MG tablet    ADDERALL    30 tablet    Take 1 tablet (30 mg) by mouth daily Must follow up before med runs out    Attention-deficit hyperactivity disorder, predominantly hyperactive type       hydrOXYzine 25 MG tablet    ATARAX    120 tablet    Take 1 tablet (25 mg) by mouth 3 times daily as needed for itching    Allergic contact dermatitis due to plant       lamoTRIgine 100 MG tablet    LAMICTAL    30 tablet    Take 0.5 tablets (50 mg) by mouth 2 times daily    JORDAN (generalized anxiety disorder), Moderate recurrent major depression (H)       venlafaxine 150 MG 24 hr capsule     EFFEXOR-XR    90 capsule    TAKE ONE CAPSULE BY MOUTH ONCE DAILY    Anxiety and depression

## 2018-12-07 NOTE — NURSING NOTE
Chief Complaint   Patient presents with     Depression     Anxiety     Health Maintenance Due   Topic Date Due     HIV SCREEN (SYSTEM ASSIGNED)  09/05/1991     LIPID SCREEN Q5 YR FEMALE (SYSTEM ASSIGNED)  09/05/2018     [employer], [facility]

## 2018-12-07 NOTE — PROGRESS NOTES
SUBJECTIVE:   Marjorie Holbrook is a 43 year old female who presents to clinic today for the following health issues:    Depression and Anxiety Follow-Up    Status since last visit: No change    Other associated symptoms:None    Complicating factors:     Significant life event: No     Current substance abuse: None    PHQ 6/18/2018 11/19/2018 12/7/2018   PHQ-9 Total Score 15 22 21   Q9: Suicide Ideation Not at all More than half the days More than half the days   F/U: Thoughts of suicide or self-harm - No Yes   F/U: Self harm-plan - - No   F/U: Self-harm action - - No   F/U: Safety concerns - No No     JORDAN-7 SCORE 7/6/2017 6/18/2018 12/7/2018   Total Score - - -   Total Score 7 11 17     In the past two weeks have you had thoughts of suicide or self-harm?  Yes  In the past two weeks have you thought of a plan or intent to harm yourself? No.  Do you have concerns about your personal safety or the safety of others?   No  PHQ-9  English  PHQ-9   Any Language  JORDAN-7  Suicide Assessment Five-step Evaluation and Treatment (SAFE-T)    Amount of exercise or physical activity: None    Problems taking medications regularly: No    Medication side effects: none    Diet: regular (no restrictions) and breakfast skipped    Marjorie is here today for follow-up on her depression, anxiety and ADHD.  She had them for years and has been taking Effexor, Wellbutrin and as needed Atarax for the anxiety/depression.  She takes Adderall for her ADHD.  She has been on the Effexor and the Wellbutrin for a while and they was working well.  She feels they are no longer as effective.  She has been feeling more depressed with a lot of anxiety, especially in the last several months.  Stated she is crying frequently for no reason.  She takes Adderall which was working but but is longer as effective.  She has trouble with focusing and distracted and have  affected her work and her life.  Feels overwhelmed easily and not able to multitasking.  Stated if  she does not take the Adderall, she would feel a significant difference and she would be able to function at all.  She has a psychological evaluation in 2012 through [facility] and she was diagnosed with ADHD with predominantly inattentive type, major depression and anxiety disorder.  I personally review the psychological evaluation report.  Stated that she been having a lot of anxiety without anxiety attack.  She worries excessively and have trouble falling and maintaining sleep.  She feels tense would screaming at her loved ones with no reasons.  She gets irritated easily with no patience for anything.  She has low energy, motivation or ambition for anything.  Stated she tried the Prozac and Zoloft in the past for years but not sure why they wre stopped.  The Effexor seem to be the most effective one.  Never seen a counselor or psychiatrist.  She is open to see them. Stated that she also has been having the suicidal ideations which has gotten worse recent weeks.  Stated that she has has it on and off for years.  She cut herself once in 2008; no more since then.  Stated that she has no intentions or plan to hurt herself or other people currently or at anytime recently.  Stated that she has good support system at home from her .  No hallucination.  No other concerns.    Problem list and histories reviewed & adjusted, as indicated.  Additional history: as documented    Current Outpatient Prescriptions   Medication Sig Dispense Refill     amphetamine-dextroamphetamine (ADDERALL) 30 MG per tablet Take 1 tablet (30 mg) by mouth daily Must follow up before med runs out 30 tablet 0     hydrOXYzine (ATARAX) 25 MG tablet Take 1 tablet (25 mg) by mouth 3 times daily as needed for itching 120 tablet 0     lamoTRIgine (LAMICTAL) 100 MG tablet Take 0.5 tablets (50 mg) by mouth 2 times daily 30 tablet 0     venlafaxine (EFFEXOR-XR) 150 MG 24 hr capsule TAKE ONE CAPSULE BY MOUTH ONCE DAILY 90 capsule 1  "    Allergies   Allergen Reactions     Metronidazole Nausea and Vomiting       Reviewed and updated as needed this visit by clinical staff  Tobacco  Allergies  Meds  Soc Hx      Reviewed and updated as needed this visit by Provider         ROS:  Constitutional, HEENT, cardiovascular, pulmonary, gi and gu systems are negative, except as otherwise noted.    OBJECTIVE:     /68 (BP Location: Left arm, Patient Position: Sitting, Cuff Size: Adult Regular)  Pulse 110  Temp 98.7  F (37.1  C) (Temporal)  Resp 16  Ht 5' 5\" (1.651 m)  Wt 136 lb 3.2 oz (61.8 kg)  LMP 08/21/2013  SpO2 100%  Breastfeeding? No  BMI 22.66 kg/m2  Body mass index is 22.66 kg/(m^2).   GENERAL: healthy, alert and no distress.  Was tearful  HENT: ear canals and TM's normal.  Nares are non-congested. No tender with palpation to the sinuses.  NECK: no adenopathy and thyroid normal to palpation  RESP: lungs clear to auscultation - no rales, rhonchi or wheezes  CV: regular rate and rhythm, no murmur.  NEURO: Normal strength and tone, mentation intact and speech normal.  Dress appropriately.  No focal neurological deficit.  PSYCH: mentation appears normal, affect slightly flat, tearful. Thought with no hallucination, psychosis or delusion.  No homicidal ideation.  Positive for suicidal ideation with no suicidal plan or intention.    Diagnostic Test Results:  none     ASSESSMENT/PLAN:     1. Moderate recurrent major depression (H)  She also has severe anxiety.  She has them for years, getting worse in the recent weeks despite of taking high-dose Effexor and Wellbutrin.  She has been having suicidal ideation with no homicidal ideation or suicidal plan/intention.  She had a history of self cutting once in 2008.  Been having more stress with her job but has good support at home, especially from her .  She feels her ADHD medication is no longer effective.  Her PHQ 9 score of 21 and JORDAN 7 score of 7 today.  She displayed no symptom of " psychosis.  There was no personal or family history is of bipolar disorder.  Has fluctuating mood swing but thought it is due to uncontrolled anxiety.  Never been evaluated by for bipolar.  Never had a manic or hypomanic episode even being on high dose of SSRI.    I discussed with her about the nature of the conditions in details.  I recommended both counseling and psychiatry which she agreed.  It typically takes a while to get a psychiatrist - I will try to manage her medication until then.  Will expedite the counseling referral and hopefully she will be able to see one early next week.  In the meantime, stop the Wellbutrin, continue to Effexor and adding Lamictal.  Side effect discussed.  Continue with the as needed Atarax.  Recommended daily exercise with healthy diet and adequate resting/water intake.  Continue with the Atarax as needed for sleeping as well.  Encouraged to avoid high sugar/caffeine intake.    Symptoms that need to be seen or call in discussed and she repeated.  Instructed to go to emergency room if she develops suicidal ideation with plans or intention or if has any concern. Follow-up in 2 weeks with me, earlier if she has any concerns or question.  She feels comfortable with the plan.  She pleged to me that she will not hurt herself or other.    - lamoTRIgine (LAMICTAL) 100 MG tablet; Take 0.5 tablets (50 mg) by mouth 2 times daily  Dispense: 30 tablet; Refill: 0    2. JORDAN (generalized anxiety disorder)  Please see #1 above for further details.  - lamoTRIgine (LAMICTAL) 100 MG tablet; Take 0.5 tablets (50 mg) by mouth 2 times daily  Dispense: 30 tablet; Refill: 0    3. Attention-deficit hyperactivity disorder, predominantly hyperactive type  This was diagnosed by the psychologist in 2008.  She did well with the Adderall with no side effect for several years.  She feels the Adderall is no longer effective.  She had more symptoms of ADHD.  Her depression and anxiety are also worsening in the last  several months.  Will treat her depression and anxiety more aggressively as above.  Will continue with the Adderall for now.  Consider to switch to a different medication if still having a problem at the next visit.  She does not drink and denies of drug use.  She feels comfortable with the plan.      Ovidio Jenkins Mai, MD  [facility]

## 2018-12-07 NOTE — Clinical Note
Please refer her to a psychiatrist as soon as possible for depression, anxiety, ADHD with suicidal ideation  Please refer her to Robert Sotelo for counseling.  She was given his business card and I left a message.  She was instructed to call him to set up the appointment.  Thank you

## 2018-12-08 ASSESSMENT — ANXIETY QUESTIONNAIRES: GAD7 TOTAL SCORE: 17

## 2018-12-10 ENCOUNTER — TELEPHONE (OUTPATIENT)
Dept: FAMILY MEDICINE | Facility: CLINIC | Age: 43
End: 2018-12-10

## 2018-12-10 NOTE — TELEPHONE ENCOUNTER
Ovidio Toribio MD P [facility]             Please refer her to a psychiatrist as soon as possible for depression, anxiety, ADHD with suicidal ideation     Please refer her to Robert Sotelo for counseling.  She was given his business card and I left a message.  She was instructed to call him to set up the appointment.     Thank you

## 2018-12-21 ENCOUNTER — OFFICE VISIT (OUTPATIENT)
Dept: FAMILY MEDICINE | Facility: CLINIC | Age: 43
End: 2018-12-21
Payer: COMMERCIAL

## 2018-12-21 VITALS
DIASTOLIC BLOOD PRESSURE: 68 MMHG | TEMPERATURE: 98.4 F | WEIGHT: 136.2 LBS | BODY MASS INDEX: 22.66 KG/M2 | SYSTOLIC BLOOD PRESSURE: 108 MMHG | RESPIRATION RATE: 16 BRPM | HEART RATE: 102 BPM | OXYGEN SATURATION: 98 %

## 2018-12-21 DIAGNOSIS — F90.1 ATTENTION-DEFICIT HYPERACTIVITY DISORDER, PREDOMINANTLY HYPERACTIVE TYPE: ICD-10-CM

## 2018-12-21 DIAGNOSIS — F41.1 GAD (GENERALIZED ANXIETY DISORDER): ICD-10-CM

## 2018-12-21 DIAGNOSIS — F33.0 MILD EPISODE OF RECURRENT MAJOR DEPRESSIVE DISORDER (H): Primary | ICD-10-CM

## 2018-12-21 PROCEDURE — 99213 OFFICE O/P EST LOW 20 MIN: CPT | Performed by: FAMILY MEDICINE

## 2018-12-21 RX ORDER — HYDROXYZINE HYDROCHLORIDE 50 MG/1
50 TABLET, FILM COATED ORAL
Qty: 90 TABLET | Refills: 1 | Status: SHIPPED | OUTPATIENT
Start: 2018-12-21 | End: 2019-11-11

## 2018-12-21 RX ORDER — DEXTROAMPHETAMINE SACCHARATE, AMPHETAMINE ASPARTATE, DEXTROAMPHETAMINE SULFATE AND AMPHETAMINE SULFATE 7.5; 7.5; 7.5; 7.5 MG/1; MG/1; MG/1; MG/1
30 TABLET ORAL DAILY
Qty: 30 TABLET | Refills: 0 | Status: SHIPPED | OUTPATIENT
Start: 2018-12-21 | End: 2019-02-11

## 2018-12-21 ASSESSMENT — ANXIETY QUESTIONNAIRES
7. FEELING AFRAID AS IF SOMETHING AWFUL MIGHT HAPPEN: SEVERAL DAYS
3. WORRYING TOO MUCH ABOUT DIFFERENT THINGS: MORE THAN HALF THE DAYS
6. BECOMING EASILY ANNOYED OR IRRITABLE: NEARLY EVERY DAY
5. BEING SO RESTLESS THAT IT IS HARD TO SIT STILL: SEVERAL DAYS
1. FEELING NERVOUS, ANXIOUS, OR ON EDGE: MORE THAN HALF THE DAYS
IF YOU CHECKED OFF ANY PROBLEMS ON THIS QUESTIONNAIRE, HOW DIFFICULT HAVE THESE PROBLEMS MADE IT FOR YOU TO DO YOUR WORK, TAKE CARE OF THINGS AT HOME, OR GET ALONG WITH OTHER PEOPLE: VERY DIFFICULT
GAD7 TOTAL SCORE: 13
2. NOT BEING ABLE TO STOP OR CONTROL WORRYING: MORE THAN HALF THE DAYS

## 2018-12-21 ASSESSMENT — PATIENT HEALTH QUESTIONNAIRE - PHQ9
5. POOR APPETITE OR OVEREATING: MORE THAN HALF THE DAYS
SUM OF ALL RESPONSES TO PHQ QUESTIONS 1-9: 13

## 2018-12-21 ASSESSMENT — PAIN SCALES - GENERAL: PAINLEVEL: NO PAIN (0)

## 2018-12-21 NOTE — PROGRESS NOTES
SUBJECTIVE:   Marjorie Holbrook is a 43 year old female who presents to clinic today for the following health issues:    Depression and Anxiety Follow-Up    Status since last visit: Improved     Other associated symptoms:None    Complicating factors:     Significant life event: No     Current substance abuse: None    PHQ 11/19/2018 12/7/2018 12/21/2018   PHQ-9 Total Score 22 21 13   Q9: Suicide Ideation More than half the days More than half the days Not at all   F/U: Thoughts of suicide or self-harm No Yes No   F/U: Self harm-plan - No No   F/U: Self-harm action - No No   F/U: Safety concerns No No No     JORDAN-7 SCORE 6/18/2018 12/7/2018 12/21/2018   Total Score - - -   Total Score 11 17 13     In the past two weeks have you had thoughts of suicide or self-harm?  No.    Do you have concerns about your personal safety or the safety of others?   No  PHQ-9  English  PHQ-9   Any Language  JORDAN-7  Suicide Assessment Five-step Evaluation and Treatment (SAFE-T)    Amount of exercise or physical activity: None    Problems taking medications regularly: No    Medication side effects: none    Diet: regular (no restrictions) and breakfast skipped      Marjorie is here today for follow-up on depression and anxiety.  She was seen 2 weeks ago and please see my last dictation for further details.  She was very depressed with suicidal ideation despite of taking the high dose of Wellbutrin and Effexor. she was started on the Lamictal; continued with the Effexor and the Wellbutrin was stopped.  She has been taking the medications as prescribed with no side effect.  She feels Lamictal has helped significantly.  She feels happier - both depression and anxiety are significantly improved.  No longer having suicidal/homicidal ideation.  No hallucination.  Her  also noted a difference and mentioned that she is a much happier person.  Overall, she is happy with the medications.  No concern.  Has not started the counseling yet; planning to  start it after the holidays.  Denies of drug use.  No other concerns.      Problem list and histories reviewed & adjusted, as indicated.  Additional history: as documented    Current Outpatient Medications   Medication Sig Dispense Refill     amphetamine-dextroamphetamine (ADDERALL) 30 MG tablet Take 1 tablet (30 mg) by mouth daily 30 tablet 0     hydrOXYzine (ATARAX) 50 MG tablet Take 1 tablet (50 mg) by mouth nightly as needed for anxiety or other (sleeping) 90 tablet 1     lamoTRIgine (LAMICTAL) 100 MG tablet Take 0.5 tablets (50 mg) by mouth 2 times daily 30 tablet 0     venlafaxine (EFFEXOR-XR) 150 MG 24 hr capsule TAKE ONE CAPSULE BY MOUTH ONCE DAILY 90 capsule 1     Allergies   Allergen Reactions     Metronidazole Nausea and Vomiting       Reviewed and updated as needed this visit by clinical staff  Tobacco  Allergies  Meds  Soc Hx      Reviewed and updated as needed this visit by Provider         ROS:  Constitutional, HEENT, cardiovascular, pulmonary, gi and gu systems are negative, except as otherwise noted.    OBJECTIVE:     /68 (BP Location: Right arm, Patient Position: Sitting, Cuff Size: Adult Regular)   Pulse 102   Temp 98.4  F (36.9  C) (Temporal)   Resp 16   Wt 61.8 kg (136 lb 3.2 oz)   LMP 08/21/2013   SpO2 98%   Breastfeeding? No   BMI 22.66 kg/m    Body mass index is 22.66 kg/m .   GENERAL: healthy, alert and no distress  RESP: lungs clear to auscultation - no rales, rhonchi or wheezes  CV: regular rate and rhythm, no murmur.  NEURO: Normal strength and tone, mentation intact and speech normal.  Dress appropriately.  PSYCH: mentation appears normal, affect normal/bright. Thought is intact, no hallucination, suicidal or homicidal       Diagnostic Test Results:  none     ASSESSMENT/PLAN:       ICD-10-CM    1. Mild episode of recurrent major depressive disorder (H) F33.0    2. Attention-deficit hyperactivity disorder, predominantly hyperactive type F90.1 amphetamine-dextroamphetamine  (ADDERALL) 30 MG tablet   3. JORDAN (generalized anxiety disorder) F41.1 hydrOXYzine (ATARAX) 50 MG tablet     Both depression and anxiety are doing better with the Lamictal and Effexor.  No side effect.  No longer having the suicidal or homicidal ideation.  No hallucination.  Will continue with Effexor and Lamictal at the current dose.  Follow-up in 3 months, earlier as needed or if has any concerns or questions.  Symptoms that need to be seen or call in discussed.  She feels comfortable with the plan.  Recommended counseling.  Pending for psychiatry appointment.    ADHD is stable.  Will continue with the Adderall. which was refilled today.    Ovidio Jenkins Mai, MD  [facility]

## 2018-12-21 NOTE — NURSING NOTE
Chief Complaint   Patient presents with     Depression     Anxiety     Lipids     Health Maintenance Due   Topic Date Due     HIV SCREEN (SYSTEM ASSIGNED)  09/05/1993     LIPID SCREEN Q5 YR FEMALE (SYSTEM ASSIGNED)  09/05/2018     [employer], [facility]

## 2018-12-22 ASSESSMENT — ANXIETY QUESTIONNAIRES: GAD7 TOTAL SCORE: 13

## 2018-12-23 PROBLEM — F33.0 MILD EPISODE OF RECURRENT MAJOR DEPRESSIVE DISORDER (H): Status: ACTIVE | Noted: 2018-12-23

## 2019-01-02 ENCOUNTER — MYC REFILL (OUTPATIENT)
Dept: FAMILY MEDICINE | Facility: CLINIC | Age: 44
End: 2019-01-02

## 2019-01-02 DIAGNOSIS — F33.1 MODERATE RECURRENT MAJOR DEPRESSION (H): ICD-10-CM

## 2019-01-02 DIAGNOSIS — F41.1 GAD (GENERALIZED ANXIETY DISORDER): ICD-10-CM

## 2019-01-04 RX ORDER — LAMOTRIGINE 100 MG/1
50 TABLET ORAL 2 TIMES DAILY
Qty: 30 TABLET | Refills: 0 | OUTPATIENT
Start: 2019-01-04

## 2019-01-04 NOTE — TELEPHONE ENCOUNTER
"Medication denied, refill given on 01/04/2019.    Lamictal  Last Written Prescription Date:  01/04/2019  Last Fill Quantity: 90,  # refills: 1   Last office visit: 12/21/2018 with prescribing provider:  viral   Future Office Visit:  None    Requested Prescriptions   Pending Prescriptions Disp Refills     lamoTRIgine (LAMICTAL) 100 MG tablet 30 tablet 0     Sig: Take 0.5 tablets (50 mg) by mouth 2 times daily    Anti-Seizure Meds Protocol  Failed - 1/3/2019  9:11 AM       Failed - Review Authorizing provider's last note.     Refer to last progress notes: confirm request is for original authorizing provider (cannot be through other providers).       Failed - Normal ALT or AST on file in past 26 months    Recent Labs   Lab Test 11/03/16  1218   ALT 15     Recent Labs   Lab Test 11/03/16  1218   AST 6          Passed - Recent (12 mo) or future (30 days) visit within the authorizing provider's specialty    Patient had office visit in the last 12 months or has a visit in the next 30 days with authorizing provider or within the authorizing provider's specialty.  See \"Patient Info\" tab in inbasket, or \"Choose Columns\" in Meds & Orders section of the refill encounter.         Passed - Normal CBC on file in past 26 months    Recent Labs   Lab Test 06/18/18  1631   WBC 5.7   RBC 4.23   HGB 13.9   HCT 40.8             Passed - Normal platelet count on file in past 26 months    Recent Labs   Lab Test 06/18/18  1631             Passed - No active pregnancy on record       Passed - No positive pregnancy test in last 12 months      Silva Kirby RN   "

## 2019-02-11 DIAGNOSIS — F90.1 ATTENTION-DEFICIT HYPERACTIVITY DISORDER, PREDOMINANTLY HYPERACTIVE TYPE: ICD-10-CM

## 2019-02-11 RX ORDER — DEXTROAMPHETAMINE SACCHARATE, AMPHETAMINE ASPARTATE, DEXTROAMPHETAMINE SULFATE AND AMPHETAMINE SULFATE 7.5; 7.5; 7.5; 7.5 MG/1; MG/1; MG/1; MG/1
30 TABLET ORAL DAILY
Qty: 30 TABLET | Refills: 0 | Status: SHIPPED | OUTPATIENT
Start: 2019-02-11 | End: 2019-04-22

## 2019-02-11 NOTE — TELEPHONE ENCOUNTER
adderall      Last Written Prescription Date:  12/21/18  Last Fill Quantity: 30,   # refills: 0  Last Office Visit: 12/21/18  Future Office visit:       Routing refill request to provider for review/approval because:  Drug not on the [facility], P or [facility] refill protocol or controlled substance

## 2019-04-04 ENCOUNTER — MYC REFILL (OUTPATIENT)
Dept: FAMILY MEDICINE | Facility: CLINIC | Age: 44
End: 2019-04-04

## 2019-04-04 DIAGNOSIS — F32.A ANXIETY AND DEPRESSION: ICD-10-CM

## 2019-04-04 DIAGNOSIS — F41.9 ANXIETY AND DEPRESSION: ICD-10-CM

## 2019-04-07 ENCOUNTER — MYC MEDICAL ADVICE (OUTPATIENT)
Dept: FAMILY MEDICINE | Facility: CLINIC | Age: 44
End: 2019-04-07

## 2019-04-07 DIAGNOSIS — R53.83 FATIGUE, UNSPECIFIED TYPE: ICD-10-CM

## 2019-04-07 DIAGNOSIS — F32.A ANXIETY AND DEPRESSION: Primary | ICD-10-CM

## 2019-04-07 DIAGNOSIS — F41.1 GAD (GENERALIZED ANXIETY DISORDER): ICD-10-CM

## 2019-04-07 DIAGNOSIS — F41.9 ANXIETY AND DEPRESSION: Primary | ICD-10-CM

## 2019-04-07 DIAGNOSIS — F90.1 ATTENTION-DEFICIT HYPERACTIVITY DISORDER, PREDOMINANTLY HYPERACTIVE TYPE: ICD-10-CM

## 2019-04-08 RX ORDER — VENLAFAXINE HYDROCHLORIDE 150 MG/1
150 CAPSULE, EXTENDED RELEASE ORAL DAILY
Qty: 90 CAPSULE | Refills: 1 | Status: SHIPPED | OUTPATIENT
Start: 2019-04-08 | End: 2019-10-07

## 2019-04-08 NOTE — TELEPHONE ENCOUNTER
"Requested Prescriptions   Pending Prescriptions Disp Refills     venlafaxine (EFFEXOR-XR) 150 MG 24 hr capsule 90 capsule 1   Last Written Prescription Date:  9/11/18  Last Fill Quantity: 90,  # refills: 1   Last office visit: 12/21/2018 with prescribing provider:     Future Office Visit:        Serotonin-Norepinephrine Reuptake Inhibitors  Passed - 4/4/2019 11:04 AM   PHQ-9 SCORE 11/19/2018 12/7/2018 12/21/2018   PHQ-9 Total Score - - -   PHQ-9 Total Score MyChart 22 (Severe depression) - -   PHQ-9 Total Score 22 21 13          Passed - Blood pressure under 140/90 in past 12 months     BP Readings from Last 3 Encounters:   12/21/18 108/68   12/07/18 112/68   09/09/18 109/62                 Passed - Recent (12 mo) or future (30 days) visit within the authorizing provider's specialty     Patient had office visit in the last 12 months or has a visit in the next 30 days with authorizing provider or within the authorizing provider's specialty.  See \"Patient Info\" tab in inbasket, or \"Choose Columns\" in Meds & Orders section of the refill encounter.              Passed - Medication is active on med list        Passed - Patient is age 18 or older        Passed - No active pregnancy on record        Passed - Normal serum creatinine on file in past 12 months     Recent Labs   Lab Test 06/18/18  1631   CR 0.64             Passed - No positive pregnancy test in past 12 months        Routing refill request to provider for review/approval because:  Labs out of range:  PHQ-9  Patient was to follow up in March  T'd up 1 month for provider review.    Will forward to schedulers to schedule patient for OV.  Mary Kraus RN              "

## 2019-04-10 NOTE — TELEPHONE ENCOUNTER
Dr. Toribio,   I think I should take the leap and see a psychiatrist. The meds don t really seem to be working. The adderall gives me the energy to keep me moving without becoming exhausted so fast, but I still don t have the focus. The  tells me I m  cranky and quick to snap back for no apparent reason .   So, I think it would be worth a shot.  Again the cost is a concern so if we can try to keep it under control that would be a great help and stress relief.     Thanks for your help!!   Marjorie

## 2019-04-22 ENCOUNTER — OFFICE VISIT (OUTPATIENT)
Dept: FAMILY MEDICINE | Facility: CLINIC | Age: 44
End: 2019-04-22
Payer: COMMERCIAL

## 2019-04-22 VITALS
RESPIRATION RATE: 14 BRPM | DIASTOLIC BLOOD PRESSURE: 72 MMHG | OXYGEN SATURATION: 99 % | HEIGHT: 65 IN | BODY MASS INDEX: 23.82 KG/M2 | SYSTOLIC BLOOD PRESSURE: 108 MMHG | TEMPERATURE: 98.1 F | HEART RATE: 100 BPM | WEIGHT: 143 LBS

## 2019-04-22 DIAGNOSIS — F33.0 MILD EPISODE OF RECURRENT MAJOR DEPRESSIVE DISORDER (H): ICD-10-CM

## 2019-04-22 DIAGNOSIS — F33.1 MODERATE RECURRENT MAJOR DEPRESSION (H): ICD-10-CM

## 2019-04-22 DIAGNOSIS — F41.1 GAD (GENERALIZED ANXIETY DISORDER): ICD-10-CM

## 2019-04-22 DIAGNOSIS — F90.1 ATTENTION-DEFICIT HYPERACTIVITY DISORDER, PREDOMINANTLY HYPERACTIVE TYPE: ICD-10-CM

## 2019-04-22 DIAGNOSIS — F51.04 PSYCHOPHYSIOLOGICAL INSOMNIA: Primary | ICD-10-CM

## 2019-04-22 PROCEDURE — 99214 OFFICE O/P EST MOD 30 MIN: CPT | Performed by: FAMILY MEDICINE

## 2019-04-22 RX ORDER — LAMOTRIGINE 100 MG/1
100 TABLET ORAL 2 TIMES DAILY
Qty: 60 TABLET | Refills: 3 | Status: SHIPPED | OUTPATIENT
Start: 2019-04-22 | End: 2019-10-07

## 2019-04-22 RX ORDER — DEXTROAMPHETAMINE SACCHARATE, AMPHETAMINE ASPARTATE, DEXTROAMPHETAMINE SULFATE AND AMPHETAMINE SULFATE 7.5; 7.5; 7.5; 7.5 MG/1; MG/1; MG/1; MG/1
30 TABLET ORAL DAILY
Qty: 30 TABLET | Refills: 0 | Status: SHIPPED | OUTPATIENT
Start: 2019-04-22 | End: 2019-06-24

## 2019-04-22 ASSESSMENT — ANXIETY QUESTIONNAIRES
IF YOU CHECKED OFF ANY PROBLEMS ON THIS QUESTIONNAIRE, HOW DIFFICULT HAVE THESE PROBLEMS MADE IT FOR YOU TO DO YOUR WORK, TAKE CARE OF THINGS AT HOME, OR GET ALONG WITH OTHER PEOPLE: VERY DIFFICULT
7. FEELING AFRAID AS IF SOMETHING AWFUL MIGHT HAPPEN: NOT AT ALL
2. NOT BEING ABLE TO STOP OR CONTROL WORRYING: MORE THAN HALF THE DAYS
1. FEELING NERVOUS, ANXIOUS, OR ON EDGE: MORE THAN HALF THE DAYS
5. BEING SO RESTLESS THAT IT IS HARD TO SIT STILL: SEVERAL DAYS
3. WORRYING TOO MUCH ABOUT DIFFERENT THINGS: MORE THAN HALF THE DAYS
6. BECOMING EASILY ANNOYED OR IRRITABLE: NEARLY EVERY DAY
GAD7 TOTAL SCORE: 13

## 2019-04-22 ASSESSMENT — MIFFLIN-ST. JEOR: SCORE: 1294.52

## 2019-04-22 ASSESSMENT — PAIN SCALES - GENERAL: PAINLEVEL: MILD PAIN (3)

## 2019-04-22 ASSESSMENT — PATIENT HEALTH QUESTIONNAIRE - PHQ9
5. POOR APPETITE OR OVEREATING: NEARLY EVERY DAY
SUM OF ALL RESPONSES TO PHQ QUESTIONS 1-9: 16

## 2019-04-22 NOTE — PROGRESS NOTES
SUBJECTIVE:   Marjorie Holbrook is a 43 year old female who presents to clinic today for the following   health issues:    Depression and Anxiety Follow-Up    Status since last visit: Improved slightly    Other associated symptoms:None    Complicating factors:     Significant life event: No     Current substance abuse: None    PHQ 12/7/2018 12/21/2018 4/22/2019   PHQ-9 Total Score 21 13 16   Q9: Thoughts of better off dead/self-harm past 2 weeks More than half the days Not at all Not at all   F/U: Thoughts of suicide or self-harm Yes No -   F/U: Self harm-plan No No -   F/U: Self-harm action No No -   F/U: Safety concerns No No -     JORDAN-7 SCORE 12/7/2018 12/21/2018 4/22/2019   Total Score - - -   Total Score 17 13 13       PHQ-9  English  PHQ-9   Any Language  JORDAN-7  Suicide Assessment Five-step Evaluation and Treatment (SAFE-T)    Amount of exercise or physical activity: None    Problems taking medications regularly: No    Medication side effects: gaining weight    Diet: regular (no restrictions)      PROBLEMS TO ADD ON...  counseling or therapy  Ringing in ears      Marjorie is here today for follow-up on her depression, anxiety and ADHD.  She has been taking the Effexor at 150 mg daily and was recently started Lamictal at 50 mg twice a day.  No side effect to medications.  Initially, the medications were working well.  She feels they are no longer working.  Been having racing thoughts which caused troubling withfalling sleep.  Been  taking hydroxyzine for it and it has helped.  Tried trazodone in the past but it was not effective.  Stated that she feel the medications also caused her to gain weight.  Both of her depression and anxiety are getting worse.  Stated that she has been snapping at people easily, especially her loved one.  Stated overall she is feeling okay, not great.  She is not happy but also not too unhappy.  She still has very little motivation or energy for any anything.  She quitted her job and that  "seemed to help some.  The Adderall has helped to boost her energy.  Her ability to focus is still iffy.  Still get distracted easily.  Her ADHD was diagnosed by a psychologist.  No suicidal or homicidal ideation.  No hallucination.  She is not sure if she should see a psychiatrist or counselor.  Due to financial restraint, she prefers to see the cheaper ones.  Denied of drug use.  No alcohol.  Feels safe and her  have been supportive.  No other concerns.    Additional history: as documented    Reviewed  and updated as needed this visit by clinical staff  Tobacco  Allergies  Meds  Soc Hx        Reviewed and updated as needed this visit by Provider         Current Outpatient Medications   Medication Sig Dispense Refill     amitriptyline (ELAVIL) 25 MG tablet Take 1 tablet (25 mg) by mouth At Bedtime 30 tablet 0     amphetamine-dextroamphetamine (ADDERALL) 30 MG tablet Take 1 tablet (30 mg) by mouth daily 30 tablet 0     hydrOXYzine (ATARAX) 50 MG tablet Take 1 tablet (50 mg) by mouth nightly as needed for anxiety or other (sleeping) 90 tablet 1     lamoTRIgine (LAMICTAL) 100 MG tablet Take 1 tablet (100 mg) by mouth 2 times daily 60 tablet 3     venlafaxine (EFFEXOR-XR) 150 MG 24 hr capsule Take 1 capsule (150 mg) by mouth daily Appointment needed for additional refills. 90 capsule 1     Allergies   Allergen Reactions     Metronidazole Nausea and Vomiting       ROS:  Constitutional, HEENT, cardiovascular, pulmonary, gi and gu systems are negative, except as otherwise noted.    OBJECTIVE:     /72 (BP Location: Right arm, Patient Position: Sitting, Cuff Size: Adult Regular)   Pulse 100   Temp 98.1  F (36.7  C) (Temporal)   Resp 14   Ht 1.651 m (5' 5\")   Wt 64.9 kg (143 lb)   LMP 08/21/2013   SpO2 99%   BMI 23.80 kg/m    Body mass index is 23.8 kg/m .   GENERAL: healthy, alert and no distress  HENT: ear canals and TM's normal.  Nares are non-congested. No tender with palpation to the " sinuses.  NECK: no adenopathy and thyroid normal to palpation  RESP: lungs clear to auscultation - no rales, rhonchi or wheezes  CV: regular rate and rhythm, no murmur.  NEURO: Normal strength and tone, mentation intact and speech normal.  Dress appropriately.  PSYCH: mentation appears normal, affect normal/bright. Thought is intact, no hallucination, suicidal or homicidal       Diagnostic Test Results:  none     ASSESSMENT/PLAN:       ICD-10-CM    1. Psychophysiological insomnia F51.04 amitriptyline (ELAVIL) 25 MG tablet   2. Mild episode of recurrent major depressive disorder (H) F33.0    3. JORDAN (generalized anxiety disorder) F41.1 lamoTRIgine (LAMICTAL) 100 MG tablet   4. Moderate recurrent major depression (H) F33.1 lamoTRIgine (LAMICTAL) 100 MG tablet     amitriptyline (ELAVIL) 25 MG tablet   5. Attention-deficit hyperactivity disorder, predominantly hyperactive type F90.1 amphetamine-dextroamphetamine (ADDERALL) 30 MG tablet     Known to have depression and anxiety for years.  They are getting worse despite of taking the Effexor and Lamictal.  No suicidal or homicidal ideation.  No hallucination.  Her PHQ 9 score of 16 and JORDAN 7 score of 13 today.  I again discussed with her about the nature of the condition.  Recommended counseling but declined due to financial strain due to co-pays.  Will refer her to psychiatry for medication management.  In the meantime, continue with her Adderall at 30 mg, Effexor 150 mg daily and increase the Lamictal 100 mg twice daily.  Side effect discussed.  Continue with the hydroxyzine as needed for anxiety and started her on amitriptyline at bedtime for sleeping.  Again, side effect also discussed.  In the meantime, emphasized on healthy diet, daily exercise with adequate resting/water intake.  Recommended to avoid high caffeine/sugar intake.  He denied of alcohol or drug use.  Follow-up in a month, earlier as needed.      Ovidio Jenkins Mai, MD  [facility]

## 2019-04-23 ASSESSMENT — ANXIETY QUESTIONNAIRES: GAD7 TOTAL SCORE: 13

## 2019-05-06 ENCOUNTER — TELEPHONE (OUTPATIENT)
Dept: FAMILY MEDICINE | Facility: CLINIC | Age: 44
End: 2019-05-06

## 2019-05-06 NOTE — TELEPHONE ENCOUNTER
Ovidio Toribio MD P [facility]             Please refer to psychiatry for depression anxiety/ADHD.

## 2019-05-06 NOTE — TELEPHONE ENCOUNTER
There was a referral placed in April- there was a letter placed in her chart, but I do not have access to look at what the letter is in re:.   Laura BAR

## 2019-05-29 ENCOUNTER — MYC REFILL (OUTPATIENT)
Dept: FAMILY MEDICINE | Facility: CLINIC | Age: 44
End: 2019-05-29

## 2019-05-29 DIAGNOSIS — F33.1 MODERATE RECURRENT MAJOR DEPRESSION (H): ICD-10-CM

## 2019-05-29 DIAGNOSIS — F51.04 PSYCHOPHYSIOLOGICAL INSOMNIA: ICD-10-CM

## 2019-05-29 NOTE — TELEPHONE ENCOUNTER
"Amitriptyline  Last Written Prescription Date:  04/22/2019  Last Fill Quantity: 30,  # refills: 0   Last office visit: 4/22/2019 with prescribing provider:  Malu   Future Office Visit:  None  Prescription approved per [facility] Refill Protocol.    Requested Prescriptions   Pending Prescriptions Disp Refills     amitriptyline (ELAVIL) 25 MG tablet 30 tablet 0     Sig: Take 1 tablet (25 mg) by mouth At Bedtime       Tricyclic Agents ( Annual appt and no PHQ9) Passed - 5/29/2019 11:50 AM        Passed - Blood Pressure under 140/90 in past 12 mos     BP Readings from Last 3 Encounters:   04/22/19 108/72   12/21/18 108/68   12/07/18 112/68           Passed - Recent (12 mo) or future (30 days) visit within authorizing provider's specialty     Patient had office visit in the last 12 months or has a visit in the next 30 days with authorizing provider or within the authorizing provider's specialty.  See \"Patient Info\" tab in inbasket, or \"Choose Columns\" in Meds & Orders section of the refill encounter.          Passed - Medication is active on med list        Passed - Patient is age 18 or older        Passed - Patient is not pregnant        Passed - No positive pregnancy test on record in past 12 mos      Silva Kirby RN   "

## 2019-06-24 DIAGNOSIS — F90.1 ATTENTION-DEFICIT HYPERACTIVITY DISORDER, PREDOMINANTLY HYPERACTIVE TYPE: ICD-10-CM

## 2019-06-24 RX ORDER — DEXTROAMPHETAMINE SACCHARATE, AMPHETAMINE ASPARTATE, DEXTROAMPHETAMINE SULFATE AND AMPHETAMINE SULFATE 7.5; 7.5; 7.5; 7.5 MG/1; MG/1; MG/1; MG/1
30 TABLET ORAL DAILY
Qty: 30 TABLET | Refills: 0 | Status: SHIPPED | OUTPATIENT
Start: 2019-06-24 | End: 2019-08-19

## 2019-06-24 NOTE — TELEPHONE ENCOUNTER
Last Written Prescription Date:  4/22/19  Last Fill Quantity: 30,  # refills: 0   Last office visit: 4/22/2019 with prescribing provider:  4/22/19   Future Office Visit:

## 2019-06-27 ENCOUNTER — OFFICE VISIT (OUTPATIENT)
Dept: URGENT CARE | Facility: RETAIL CLINIC | Age: 44
End: 2019-06-27
Payer: COMMERCIAL

## 2019-06-27 VITALS — DIASTOLIC BLOOD PRESSURE: 69 MMHG | SYSTOLIC BLOOD PRESSURE: 113 MMHG | TEMPERATURE: 98.6 F | HEART RATE: 67 BPM

## 2019-06-27 DIAGNOSIS — N30.00 ACUTE CYSTITIS WITHOUT HEMATURIA: Primary | ICD-10-CM

## 2019-06-27 DIAGNOSIS — R30.0 DYSURIA: ICD-10-CM

## 2019-06-27 LAB
BILIRUB UR QL: ABNORMAL
CLARITY: ABNORMAL
COLOR UR: YELLOW
GLUCOSE URINE: ABNORMAL MG/DL
HGB UR QL: ABNORMAL
KETONES UR QL: ABNORMAL MG/DL
NITRITE UR QL STRIP: ABNORMAL
PH UR STRIP: 6 PH (ref 5–7)
PROT UR QL: ABNORMAL MG/DL
SP GR UR STRIP: 1.03 (ref 1–1.03)
SPECIMEN VOL UR: ABNORMAL ML
UROBILINOGEN UR QL STRIP: 1 EU/DL (ref 0.2–1)
WBC #/AREA URNS HPF: ABNORMAL /[HPF]

## 2019-06-27 PROCEDURE — 99213 OFFICE O/P EST LOW 20 MIN: CPT | Performed by: PHYSICIAN ASSISTANT

## 2019-06-27 PROCEDURE — 87086 URINE CULTURE/COLONY COUNT: CPT | Performed by: PHYSICIAN ASSISTANT

## 2019-06-27 PROCEDURE — 81003 URINALYSIS AUTO W/O SCOPE: CPT | Performed by: PHYSICIAN ASSISTANT

## 2019-06-27 PROCEDURE — 87186 SC STD MICRODIL/AGAR DIL: CPT | Performed by: PHYSICIAN ASSISTANT

## 2019-06-27 PROCEDURE — 87088 URINE BACTERIA CULTURE: CPT | Performed by: PHYSICIAN ASSISTANT

## 2019-06-27 RX ORDER — SULFAMETHOXAZOLE/TRIMETHOPRIM 800-160 MG
1 TABLET ORAL 2 TIMES DAILY
Qty: 6 TABLET | Refills: 0 | Status: SHIPPED | OUTPATIENT
Start: 2019-06-27 | End: 2019-10-09

## 2019-06-27 ASSESSMENT — ENCOUNTER SYMPTOMS
FLANK PAIN: 1
CHILLS: 0
DYSURIA: 1
FEVER: 0
ABDOMINAL PAIN: 0
DIZZINESS: 0
FREQUENCY: 1
DIAPHORESIS: 0
MYALGIAS: 0
VOMITING: 0
NAUSEA: 0

## 2019-06-27 NOTE — PROGRESS NOTES
Chief Complaint   Patient presents with     Flank Pain     1 week     UTI     urgency; 1 week     SUBJECTIVE:  Marjorie Holbrook is a 43 year old female who presents today for a possible UTI  She has symptoms of dysuria, frequency, malodorous urine, and lower back / flank pain that have been going on for 1 week(s)  Symptom timing described as gradual onset and moderate in severity  This patient does have a history of urinary tract infections, last infection was about 3-4 years ago, history of pyelonephritis  There is no current gross hematuria, fever, chills, nausea or vomiting    Past Medical History:   Diagnosis Date     Arthritis     maternal grandmother     ASCUS favoring benign 10/9/14     Asthma      Congestive heart failure, unspecified     maternal grandfather     COPD (chronic obstructive pulmonary disease) (H)     maternal grandfather and mother     CVA (cerebral infarction)     maternal grandfather     Hypertension     maternal grandmother     Pyelonephritis 10/2011     Recurrent major depression in partial remission (H) 2/17/2010     Thyroid disease     mother     Current Outpatient Medications   Medication Sig Dispense Refill     amitriptyline (ELAVIL) 25 MG tablet Take 1 tablet (25 mg) by mouth At Bedtime 30 tablet 0     amphetamine-dextroamphetamine (ADDERALL) 30 MG tablet Take 1 tablet (30 mg) by mouth daily 30 tablet 0     hydrOXYzine (ATARAX) 50 MG tablet Take 1 tablet (50 mg) by mouth nightly as needed for anxiety or other (sleeping) 90 tablet 1     lamoTRIgine (LAMICTAL) 100 MG tablet Take 1 tablet (100 mg) by mouth 2 times daily 60 tablet 3     sulfamethoxazole-trimethoprim (BACTRIM DS/SEPTRA DS) 800-160 MG tablet Take 1 tablet by mouth 2 times daily for 3 days 6 tablet 0     venlafaxine (EFFEXOR-XR) 150 MG 24 hr capsule Take 1 capsule (150 mg) by mouth daily Appointment needed for additional refills. 90 capsule 1     Social History     Tobacco Use     Smoking status: Former Smoker     Packs/day:  0.50     Years: 9.00     Pack years: 4.50     Types: Cigarettes     Start date: 2008     Last attempt to quit: 6/15/2018     Years since quittin.0     Smokeless tobacco: Never Used     Tobacco comment: less than .5 pack, started age 32   Substance Use Topics     Alcohol use: Yes     Alcohol/week: 0.0 oz     Comment: occ.     Allergies   Allergen Reactions     Metronidazole Nausea and Vomiting     REVIEW OF SYSTEMS  Review of Systems   Constitutional: Negative for chills, diaphoresis and fever.   Gastrointestinal: Negative for abdominal pain, nausea and vomiting.   Genitourinary: Positive for dysuria, flank pain (muscular spasms), frequency and urgency.        Malodorous urine   Musculoskeletal: Negative for myalgias.   Neurological: Negative for dizziness.     OBJECTIVE:  /69 (BP Location: Left arm)   Pulse 67   Temp 98.6  F (37  C) (Oral)   LMP 2013      Physical Exam   Constitutional: She is oriented to person, place, and time. She appears well-developed and well-nourished. No distress.   Genitourinary:   Genitourinary Comments: No CVA tenderness to percussion   Neurological: She is alert and oriented to person, place, and time.   Skin: Skin is warm and dry. She is not diaphoretic.   Psychiatric: She has a normal mood and affect. Her behavior is normal.     Results for orders placed or performed in visit on 19   UA without Microscopic   Result Value Ref Range    Glucose Urine Neg neg mg/dL    Bilirubin Urine Neg neg    Ketones Urine Neg neg mg/dL    Specific Gravity Urine 1.030 1.003 - 1.035    pH Urine 6.0 5.0 - 7.0 pH    Protein Urine neg neg - neg mg/dL    Urobilinogen Urine 1  0.2 - 1.0 EU/dL    Nitrite Urine Neg NEG    Blood Urine Small neg    Leukocytes trace     Color Urine Yellow     Clarity cloudy     Volume         ASSESSMENT:    ICD-10-CM    1. Acute cystitis without hematuria N30.00 sulfamethoxazole-trimethoprim (BACTRIM DS/SEPTRA DS) 800-160 MG tablet   2. Dysuria R30.0  Urine Culture Aerobic Bacterial     UA without Microscopic     PLAN:   Patient Instructions   Take full course of antibiotics.  Urine culture pending, we will call you only if culture shows resistance and change of antibiotic is required or if no growth to stop antibiotics and to follow-up with your primary care provider.  Please follow up with primary care provider in 1 week for a repeat urine test as there was blood in your urine today.  May use over the counter AZO pain relief or Uristat (phenazopyridine) for urinary burning but do not use for 24 hours before future urine tests.  Drink plenty of fluids. Limit caffeine and alcohol as these are bladder irritants.  May take tylenol or ibuprofen as needed for discomfort.   If you develop any vomiting, high fevers or lower back pain, these can be signs of a kidney infection and you should be seen in urgent care or in the ER.  Prevention of future infections by drinking cranberry juice, urination after intercourse and wiping from front to back after using the toilet.  Please follow up with primary care provider if symptoms return, if you're not improving, worsening or new symptoms or for any adverse reactions to medications.     Follow up with primary care provider with any problems, questions or concerns or if symptoms worsen or fail to improve. Patient agreed to plan and verbalized understanding.    Lakeshia Gonzalez NP  [facility]

## 2019-06-27 NOTE — PATIENT INSTRUCTIONS
Take full course of antibiotics.  Urine culture pending, we will call you only if culture shows resistance and change of antibiotic is required or if no growth to stop antibiotics and to follow-up with your primary care provider.  Please follow up with primary care provider in 1 week for a repeat urine test as there was blood in your urine today.  May use over the counter AZO pain relief or Uristat (phenazopyridine) for urinary burning but do not use for 24 hours before future urine tests.  Drink plenty of fluids. Limit caffeine and alcohol as these are bladder irritants.  May take tylenol or ibuprofen as needed for discomfort.   If you develop any vomiting, high fevers or lower back pain, these can be signs of a kidney infection and you should be seen in urgent care or in the ER.  Prevention of future infections by drinking cranberry juice, urination after intercourse and wiping from front to back after using the toilet.  Please follow up with primary care provider if symptoms return, if you're not improving, worsening or new symptoms or for any adverse reactions to medications.

## 2019-06-30 LAB
BACTERIA SPEC CULT: ABNORMAL
BACTERIA SPEC CULT: ABNORMAL
Lab: ABNORMAL
SPECIMEN SOURCE: ABNORMAL

## 2019-06-30 NOTE — RESULT ENCOUNTER NOTE
UC final>100,000 colonies/mL Escherichia coli and >100,000 colonies/mL Strain 2 Escherichia coli, both susceptible to Bactrim course.- Verna Sykes PA-C

## 2019-08-12 ENCOUNTER — TELEPHONE (OUTPATIENT)
Dept: FAMILY MEDICINE | Facility: CLINIC | Age: 44
End: 2019-08-12

## 2019-08-16 DIAGNOSIS — F90.1 ATTENTION-DEFICIT HYPERACTIVITY DISORDER, PREDOMINANTLY HYPERACTIVE TYPE: ICD-10-CM

## 2019-08-16 NOTE — TELEPHONE ENCOUNTER
amphetamine-dextroamphetamine (ADDERALL) 30 MG tablet    Last Written Prescription Date:  06/24/2019  Last Fill Quantity: 30,  # refills: 0   Last office visit: 4/22/2019 with prescribing provider:     Future Office Visit:

## 2019-08-19 RX ORDER — DEXTROAMPHETAMINE SACCHARATE, AMPHETAMINE ASPARTATE, DEXTROAMPHETAMINE SULFATE AND AMPHETAMINE SULFATE 7.5; 7.5; 7.5; 7.5 MG/1; MG/1; MG/1; MG/1
30 TABLET ORAL DAILY
Qty: 30 TABLET | Refills: 0 | Status: SHIPPED | OUTPATIENT
Start: 2019-08-19 | End: 2019-10-07

## 2019-08-19 NOTE — TELEPHONE ENCOUNTER
Pt didn't return phone calls. PHQ-9 missed. I will close the encounter until further outreach. Ling Davenport CMA ([facility])

## 2019-10-07 ENCOUNTER — MYC REFILL (OUTPATIENT)
Dept: FAMILY MEDICINE | Facility: CLINIC | Age: 44
End: 2019-10-07

## 2019-10-07 ENCOUNTER — E-VISIT (OUTPATIENT)
Dept: FAMILY MEDICINE | Facility: CLINIC | Age: 44
End: 2019-10-07
Payer: COMMERCIAL

## 2019-10-07 ENCOUNTER — TRANSFERRED RECORDS (OUTPATIENT)
Dept: HEALTH INFORMATION MANAGEMENT | Facility: CLINIC | Age: 44
End: 2019-10-07

## 2019-10-07 DIAGNOSIS — F33.1 MODERATE RECURRENT MAJOR DEPRESSION (H): Primary | ICD-10-CM

## 2019-10-07 DIAGNOSIS — F90.1 ATTENTION-DEFICIT HYPERACTIVITY DISORDER, PREDOMINANTLY HYPERACTIVE TYPE: ICD-10-CM

## 2019-10-07 DIAGNOSIS — F41.1 GAD (GENERALIZED ANXIETY DISORDER): ICD-10-CM

## 2019-10-07 LAB — PHQ9 SCORE: 17

## 2019-10-07 PROCEDURE — 99207 ZZC NO BILLABLE SERVICE THIS VISIT: CPT | Performed by: FAMILY MEDICINE

## 2019-10-07 RX ORDER — DEXTROAMPHETAMINE SACCHARATE, AMPHETAMINE ASPARTATE, DEXTROAMPHETAMINE SULFATE AND AMPHETAMINE SULFATE 7.5; 7.5; 7.5; 7.5 MG/1; MG/1; MG/1; MG/1
30 TABLET ORAL DAILY
Qty: 30 TABLET | Refills: 0 | Status: SHIPPED | OUTPATIENT
Start: 2019-10-07 | End: 2019-11-11

## 2019-10-07 ASSESSMENT — ANXIETY QUESTIONNAIRES
GAD7 TOTAL SCORE: 14
6. BECOMING EASILY ANNOYED OR IRRITABLE: NEARLY EVERY DAY
5. BEING SO RESTLESS THAT IT IS HARD TO SIT STILL: SEVERAL DAYS
4. TROUBLE RELAXING: NEARLY EVERY DAY
2. NOT BEING ABLE TO STOP OR CONTROL WORRYING: MORE THAN HALF THE DAYS
GAD7 TOTAL SCORE: 14
GAD7 TOTAL SCORE: 14
1. FEELING NERVOUS, ANXIOUS, OR ON EDGE: MORE THAN HALF THE DAYS
7. FEELING AFRAID AS IF SOMETHING AWFUL MIGHT HAPPEN: NOT AT ALL
3. WORRYING TOO MUCH ABOUT DIFFERENT THINGS: NEARLY EVERY DAY
7. FEELING AFRAID AS IF SOMETHING AWFUL MIGHT HAPPEN: NOT AT ALL

## 2019-10-07 ASSESSMENT — PATIENT HEALTH QUESTIONNAIRE - PHQ9
SUM OF ALL RESPONSES TO PHQ QUESTIONS 1-9: 16
SUM OF ALL RESPONSES TO PHQ QUESTIONS 1-9: 16
10. IF YOU CHECKED OFF ANY PROBLEMS, HOW DIFFICULT HAVE THESE PROBLEMS MADE IT FOR YOU TO DO YOUR WORK, TAKE CARE OF THINGS AT HOME, OR GET ALONG WITH OTHER PEOPLE: EXTREMELY DIFFICULT

## 2019-10-07 NOTE — TELEPHONE ENCOUNTER
adderall      Last Written Prescription Date:  08/19/19  Last Fill Quantity: 30,   # refills: 0  Last Office Visit: 04/22/19  Future Office visit:       Routing refill request to provider for review/approval because:  Drug not on the [facility], [facility] or [facility] refill protocol or controlled substance

## 2019-10-08 ASSESSMENT — ANXIETY QUESTIONNAIRES: GAD7 TOTAL SCORE: 14

## 2019-10-08 ASSESSMENT — PATIENT HEALTH QUESTIONNAIRE - PHQ9: SUM OF ALL RESPONSES TO PHQ QUESTIONS 1-9: 16

## 2019-10-09 NOTE — PATIENT INSTRUCTIONS
"  Thank you for choosing us for your care. Based on your symptoms and length of illness, I do not think that you need a prescription at this time but would like you to start working with a therapy.  Please follow the care advise I've provided and the  will call you with a therapy appointment.  View your full visit summary for details by clicking on the link below.     If you're not feeling better within 4-6 weeks, please let me know and we can consider if a prescription is needed.  If you are feeling worse, please let me know before then.  You can schedule an appointment right here in [facility], or call [phone]  If the visit is for the same symptoms as your e-visit, we'll refund the cost of your e-visit if seen within seven days.      Warning Signs of Suicide and What You Can Do    If you think a person could be suicidal, ask, \"Have you thought about suicide?\" If they say \"yes,\" they may already have a plan for how and when they will attempt it. Find out as much as you can. The more detailed the plan, and the easier it is to carry out, the more danger the person is in right now.  Know the warning signs  The warning signs for suicide include:    Threats or talk of suicide    Sense of hopelessness    Buying a gun or other weapon    Statements such as \"Soon, I won't be a problem\" or \"Nothing matters\"    Giving away items they own, making out a will, or planning their     Suddenly being happy or calm after being depressed  Factors that put a person at a higher risk of attempting suicide include:    A history of suicide in the person's family    Previous suicide attempts    Alcohol and drug use, along with impulsive behaviors    Having a diagnose mood disorder such as depression or bipolar disorder    History of trauma or abuse including bullying    Significant losses such as a divorce, death of a loved one, financial problems, or legal problems    Having access to a lethal weapon (for example firearms " "in the home)    Chronic physical illnesses, including chronic pain    Exposure to suicidal behavior of others  Get help  Don't try to handle this alone. You can be the most help by getting the person to a trained professional. Suicidal thinking may be a sign of depression, a serious but treatable illness.  In an emergency--call 911  Don't leave the person alone. Anyone who is at imminent risk of suicide needs psychiatric services right away. The person must be continuously monitored, and never left out of sight. Call 911 or a 24-hour suicide crisis hotline. It can be found in the white pages of your phone book under \"Suicide.\" You can also take the person to the nearest hospital emergency room (ER).  Don't keep it a secret and don't wait  Call a mental health clinic or a licensed mental health professional in your area right away: a psychiatrist, clinical psychologist, psychiatric or licensed clinical , marriage and family counselor, or clergy. Tell them you need help for a person who is thinking about suicide.  Resources    National Suicide Prevention Rhnwterh195-954-1698 (066-473-FLDY)www.suicidepreventionlifeline.org    National Suicide Iqiztaj775-991-7437 (800-SUICIDE)    National [place] of Mental Piwdkr116-005-2600rmg.nimh.nih.gov    National [place] on Mental Gsyrfha060-660-1403una.gloria.org    Mental Health Zjijtmf542-674-7167swg.nmha.org   Date Last Reviewed: 1/1/2017 2000-2018 [employer]. [address] [place] [address], [place] [address]. All rights reserved. This information is not intended as a substitute for professional medical care. Always follow your healthcare professional's instructions.          Recognizing Suicide Warning Signs in Yourself    People who are thinking about suicide may not know they are depressed. Certain thoughts, feelings, and actions can be signals that let you know you may need help. The best thing you can do is watch for signs that you may be at risk. " "Then, ask for help. You can talk to your regular healthcare provider or seek help from a mental health provider.  Depression  Depression is a treatable illness. To know if depression is causing you to feel like ending your life, ask yourself:    Do I feel worthless, guilty, helpless, or hopeless?    Have I been feeling sad, down, or blue on most days?    Have I lost interest in my work or people I used to enjoy?    Do I have trouble sleeping or do I sleep too much?    Do I eat more or less than usual?    Do I feel tired, weak, and low on energy?    Do I feel restless and unable to sit still?    Do I have trouble thinking or making choices?    Do I cry more than usual?    Do I feel life isn't worth living?  Warning signs for suicide    Thinking often about taking your life    Planning how you may attempt it    Talking or writing about committing suicide    Feeling that death is the only solution to your problems    Feeling a pressing need to make out your will or arrange your     Giving away things you own    Participating in risky behaviors, such as sex with someone you don't know or drinking and driving    Buying a lethal weapon, such as a gun, or hoarding medicines that could be used in an over dose  If you notice any of these warning signs, call for help right away or go to your closest hospital emergency department. You can also call a mental health clinic or a 24-hour suicide crisis hotline for help and support. Search for local suicide prevention resources on your computer or look for the number in the white pages of your phone book under \"Suicide.\" In an emergency, if you are in immediate risk of harming yourself, call 911. For more information about depression:    National [place] of Mental Whtuiz174-878-2796zrs.[facility] Hospitalh.nih.gov    National Suicide Prevention Mlgklztk338-064-3982 (853-529-ELUB)www.suicidepreventionlifeline.org    National [place] on Mental Xyqqccm831-846-7971aot.gloria.org    Mental " [facility] / NHRMCYqtqznh704-616-1613wlq.[facility].org    National Suicide Kudmxqy154-818-1657 (800-SUICIDE)   Date Last Reviewed: 1/1/2017 2000-2018 [employer]. [address] [place] [address], [place] [address]. All rights reserved. This information is not intended as a substitute for professional medical care. Always follow your healthcare professional's instructions.          Using Antidepressants  Depression is a mood disorder that affects the way you think and feel. The most common symptom is a feeling of deep sadness. This feeling does not go away or get better on its own. But most types of depression can be helped with therapy and antidepressant medicines. (Note: This covers antidepressant use in adults only.)    What do antidepressants do?  Antidepressants restore the balance of certain chemicals in your brain to help ease your depression. You will likely feel better in 4 to 6 weeks. But you may continue taking antidepressants for a year or more to keep your symptoms from coming back. Some people with depression need to take antidepressants for life. There are several types of antidepressants. The main types are described below.  Selective serotonin reuptake inhibitors (SSRIs)  SSRIs are the most effective medicines for the treatment of depression. They tend to have fewer side effects than other antidepressants. Possible side effects include anxiety, trouble sleeping, nausea, diarrhea, sexual dysfunction, and headaches. In rare cases, they may make you more depressed. SSRIs shouldn t be mixed with certain other medicines. Talk with your healthcare provider about all the medicines, herbs, and supplements you are taking.  Tricyclic antidepressants  Tricyclics help severe or long-term depression. They have been used for many years with good results. Possible side effects include blurred vision, dry mouth, and constipation.  Monoamine oxidase inhibitors (MAOIs)  If you don t respond to tricyclics or SSRIs, your  healthcare provider may prescribe MAOIs. These medicines can be very effective. But people taking MAOIs must stay away from certain foods and medicines. Your healthcare provider can tell you more.  Lithium  If you have bipolar disorder, you may take a medicine called lithium. This medicine helps even out your mood. Possible side effects are weight gain, trembling, loose stool, and nausea. Lithium is also used:    For people who have unipolar depression and have not responded to other antidepressants    For people who have a sudden (acute) episode of unipolar depression    As a maintenance medicine to prevent unipolar depression from happening again  Things to avoid if you are taking MAOIs  If you are taking MAOIs, don t take any of the following:    Beans    Aged cheese    Chocolate    Red wine    Most cold medicines    Certain medicines (ask your healthcare provider)   To reduce the risk of lithium poisoning  You can reduce the risk of lithium poisoning by following this advice:    Take only the prescribed amount of lithium. If your depressive symptoms get worse, contact your healthcare provider. Never increase your medicine on your own.    Drink plenty of fluids other than coffee, tea, and soda.    Limit salt in your diet.    Before using other prescribed medicines or over-the-counter medicines, check with your pharmacist. This is to be sure the medicines won t interact with the lithium.    Never share your medicines or use another person's medicines, even if it is the same medicine and dose.   If you have side effects  The side effects of antidepressants are usually mild. But if you have troubling side effects, call your healthcare provider. Changing the dosage or type of medicine may help. Never stop taking medicines on your own.  Date Last Reviewed: 5/1/2017 2000-2018 [employer]. [address] [place] [address], [place] [address]. All rights reserved. This information is not intended as a substitute  for professional medical care. Always follow your healthcare professional's instructions.          Depression: Tips to Help Yourself    As your healthcare providers help treat your depression, you can also help yourself. Keep in mind that your illness affects you emotionally, physically, mentally, and socially. So full recovery will take time. Take care of your body and your soul, and be patient with yourself as you get better.  Self-care    Educate yourself. Read about treatment and medicine options. If you have the energy, attend local conferences or support groups. Keep a list of useful websites and helpful books and use them as needed. This illness is not your fault. Don t blame yourself for your depression.    Manage early symptoms. If you notice symptoms returning, experience triggers, or identify other factors that may lead to a depressive episode, get help as soon as possible. Ask trusted friends and family to monitor your behavior and let you know if they see anything of concern.    Work with your provider. Find a provider you can trust. Communicate honestly with that person and share information on your treatment for depression and your reaction to medicines.    Be prepared for a crisis. Know what to do if you experience a crisis. Keep the phone number of a crisis hotline and know the location of your community's urgent care centers and the closest emergency department.    Hold off on big decisions. Depression can cloud your judgment. So wait until you feel better before making major life decisions, such as changing jobs, moving, or getting  or .    Be patient. Recovering from depression is a process. Don t be discouraged if it takes some time to feel better.    Keep it simple. Depression saps your energy and concentration. So you won t be able to do all the things you used to do. Set small goals and do what you can.    Be with others. Don t isolate yourself--you ll only feel worse. Try to  be with other people. And take part in fun activities when you can. Go to a movie, ballgame, [employer] service, or social event. Talk openly with people you can trust. And accept help when it s offered.  Take care of your body  People with depression often lose the desire to take care of themselves. That only makes their problems worse. During treatment and afterward, make a point to:    Exercise. It s a great way to take care of your body. And studies have shown that exercise helps fight depression.    Avoid drugs and alcohol. These may ease the pain in the short term. But they ll only make your problems worse in the long run.    Get relief from stress. Ask your healthcare provider for relaxation exercises and techniques to help relieve stress.    Eat right. A balanced and healthy diet helps keep your body healthy.  Date Last Reviewed: 1/1/2017 2000-2018 [employer]. [address] [place] [address]. All rights reserved. This information is not intended as a substitute for professional medical care. Always follow your healthcare professional's instructions.          Depression Affects Your Mind and Body    Everyone feels sad or  blue  from time to time for a few days or weeks. Depression is when these feelings don't go away and they interfere with daily life.  Depression is a real illness that can develop at any age. It is one of the most common mental health problems in the [place] Depression makes you feel sad, helpless, and hopeless. It gets in the way of your life and relationships. It inhibits your ability to think and act. But, with help, you can feel better again.  Depression affects your whole body  Brain chemicals affect your body as well as your mood. So depression may do more than just make you feel low. You may also feel bad physically. Depression can:    Cause trouble with mental tasks such as remembering, concentrating, or making decisions    Make you feel nervous and  jumpy    Cause trouble sleeping. Or you may sleep too much    Change your appetite    Cause headaches, stomachaches, or other aches and pains    Drain your body of energy  Depression and other illness  It is common for people who have chronic health problems to also have depression. It can often be hard to tell which one caused the other. A person might become depressed after finding out they have a health problem. But some studies suggest being depressed may make certain health problems more likely. And some depressed people stop taking care of themselves. This may make them more likely to get sick.  Date Last Reviewed: 1/1/2017 2000-2018 [employer]. [address] [place] [address], [place] [address]. All rights reserved. This information is not intended as a substitute for professional medical care. Always follow your healthcare professional's instructions.        Dear Marjorie,  I am sorry to hear that you are not feeling much better with the medications that you are on.  I think it is time for you to see a psychiatrist who is specialize on Depression and Anxiety medication to get second opinion. We have a psychiatry provider her and I referred you to her.  Her office will give you a call soon; let me know if you do not hear from them by the end of the week.  I will not change on your depression/anxiety medications for now. I am sorry for being slow in responding, I have been out of the office for emergency medical leave.  Please let me know if you have any further questions. Thanks.  Ovidio Toribio MD.

## 2019-11-11 ENCOUNTER — OFFICE VISIT (OUTPATIENT)
Dept: PSYCHIATRY | Facility: OTHER | Age: 44
End: 2019-11-11
Attending: FAMILY MEDICINE
Payer: COMMERCIAL

## 2019-11-11 VITALS
WEIGHT: 135 LBS | BODY MASS INDEX: 22.49 KG/M2 | HEIGHT: 65 IN | DIASTOLIC BLOOD PRESSURE: 64 MMHG | SYSTOLIC BLOOD PRESSURE: 122 MMHG | HEART RATE: 72 BPM

## 2019-11-11 DIAGNOSIS — G47.00 INSOMNIA, UNSPECIFIED TYPE: ICD-10-CM

## 2019-11-11 DIAGNOSIS — F41.9 ANXIETY: ICD-10-CM

## 2019-11-11 DIAGNOSIS — F33.1 MODERATE EPISODE OF RECURRENT MAJOR DEPRESSIVE DISORDER (H): Primary | ICD-10-CM

## 2019-11-11 DIAGNOSIS — F90.0 ATTENTION DEFICIT HYPERACTIVITY DISORDER (ADHD), PREDOMINANTLY INATTENTIVE TYPE: ICD-10-CM

## 2019-11-11 DIAGNOSIS — Z51.81 ENCOUNTER FOR THERAPEUTIC DRUG MONITORING: ICD-10-CM

## 2019-11-11 PROBLEM — F33.0 MILD EPISODE OF RECURRENT MAJOR DEPRESSIVE DISORDER (H): Status: RESOLVED | Noted: 2018-12-23 | Resolved: 2019-11-11

## 2019-11-11 LAB
CHOLEST SERPL-MCNC: 174 MG/DL
HBA1C MFR BLD: 4.9 % (ref 0–5.6)
HDLC SERPL-MCNC: 83 MG/DL
LDLC SERPL CALC-MCNC: 82 MG/DL
NONHDLC SERPL-MCNC: 91 MG/DL
TRIGL SERPL-MCNC: 45 MG/DL

## 2019-11-11 PROCEDURE — 90792 PSYCH DIAG EVAL W/MED SRVCS: CPT | Performed by: PSYCHIATRY & NEUROLOGY

## 2019-11-11 PROCEDURE — 83036 HEMOGLOBIN GLYCOSYLATED A1C: CPT | Performed by: PSYCHIATRY & NEUROLOGY

## 2019-11-11 PROCEDURE — 36415 COLL VENOUS BLD VENIPUNCTURE: CPT | Performed by: PSYCHIATRY & NEUROLOGY

## 2019-11-11 PROCEDURE — 80061 LIPID PANEL: CPT | Performed by: PSYCHIATRY & NEUROLOGY

## 2019-11-11 RX ORDER — DEXTROAMPHETAMINE SACCHARATE, AMPHETAMINE ASPARTATE, DEXTROAMPHETAMINE SULFATE AND AMPHETAMINE SULFATE 7.5; 7.5; 7.5; 7.5 MG/1; MG/1; MG/1; MG/1
30 TABLET ORAL DAILY
Qty: 30 TABLET | Refills: 0 | Status: SHIPPED | OUTPATIENT
Start: 2019-11-11 | End: 2020-01-09

## 2019-11-11 RX ORDER — VENLAFAXINE HYDROCHLORIDE 75 MG/1
75 CAPSULE, EXTENDED RELEASE ORAL DAILY
Qty: 30 CAPSULE | Refills: 1 | Status: SHIPPED | OUTPATIENT
Start: 2019-11-11 | End: 2020-01-09

## 2019-11-11 RX ORDER — QUETIAPINE FUMARATE 25 MG/1
25 TABLET, FILM COATED ORAL
Qty: 30 TABLET | Refills: 0 | Status: SHIPPED | OUTPATIENT
Start: 2019-11-11 | End: 2019-12-09

## 2019-11-11 RX ORDER — LAMOTRIGINE 200 MG/1
200 TABLET ORAL DAILY
Qty: 30 TABLET | Refills: 1 | Status: SHIPPED | OUTPATIENT
Start: 2019-11-11 | End: 2020-01-09

## 2019-11-11 RX ORDER — VENLAFAXINE HYDROCHLORIDE 150 MG/1
CAPSULE, EXTENDED RELEASE ORAL
Qty: 30 CAPSULE | Refills: 1 | Status: SHIPPED | OUTPATIENT
Start: 2019-11-11 | End: 2020-01-09

## 2019-11-11 ASSESSMENT — PATIENT HEALTH QUESTIONNAIRE - PHQ9: SUM OF ALL RESPONSES TO PHQ QUESTIONS 1-9: 18

## 2019-11-11 ASSESSMENT — ANXIETY QUESTIONNAIRES
1. FEELING NERVOUS, ANXIOUS, OR ON EDGE: MORE THAN HALF THE DAYS
3. WORRYING TOO MUCH ABOUT DIFFERENT THINGS: NEARLY EVERY DAY
IF YOU CHECKED OFF ANY PROBLEMS ON THIS QUESTIONNAIRE, HOW DIFFICULT HAVE THESE PROBLEMS MADE IT FOR YOU TO DO YOUR WORK, TAKE CARE OF THINGS AT HOME, OR GET ALONG WITH OTHER PEOPLE: VERY DIFFICULT
7. FEELING AFRAID AS IF SOMETHING AWFUL MIGHT HAPPEN: SEVERAL DAYS
4. TROUBLE RELAXING: NEARLY EVERY DAY
5. BEING SO RESTLESS THAT IT IS HARD TO SIT STILL: NEARLY EVERY DAY
2. NOT BEING ABLE TO STOP OR CONTROL WORRYING: NEARLY EVERY DAY
GAD7 TOTAL SCORE: 18
6. BECOMING EASILY ANNOYED OR IRRITABLE: NEARLY EVERY DAY

## 2019-11-11 ASSESSMENT — MIFFLIN-ST. JEOR: SCORE: 1253.24
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
## 2019-11-11 NOTE — PATIENT INSTRUCTIONS
Treatment Plan:    Continue Lamotrigine 200 mg AM, Continue Adderall 30 mg daily for ADHD    Discontinue amitriptyline     Increase Effexor XR to 225 mg daily for mood and anxiety    Start Seroquel 25 mg at bedtime for sleep as needed     Have blood drawn today.     Continue all other cares per primary care provider.     Continue all other medications as reviewed per electronic medical record today.     Safety plan reviewed. To the Emergency Department as needed or call after hours crisis line at [phone] or [phone]. [place] Crisis Text Line: Text [place] to 785454  or  Suicide LifeLine Chat: suicideGetix.org/chat    Schedule an appointment with me in 4 weeks or sooner as needed.  Call [facility] at [phone] to schedule.    Follow up with primary care provider as planned or sooner if needed for acute medical concerns.    Call the psychiatric nurse line with medication questions or concerns at [phone].    [place] may be used to communicate with your provider, but this is not intended to be used for emergencies.    Community Resources:    National Suicide Prevention Lifeline: 715.804.4828 (TTY: 446.628.9860). Call anytime for help.  (www.suicidepreventionlifeline.org)  National [place] on Mental Illness (www.gloria.org): 893.133.4820 or 111-766-0973.   Mental Health Association (www.mentalhealth.org): 534.778.2377 or 564-413-4324.  [place] Crisis Text Line: Text [place] to 547320  Suicide LifeLine Chat: suicideGetix.org/chat

## 2019-11-12 ASSESSMENT — ANXIETY QUESTIONNAIRES: GAD7 TOTAL SCORE: 18

## 2019-12-09 ENCOUNTER — TELEPHONE (OUTPATIENT)
Dept: PSYCHIATRY | Facility: OTHER | Age: 44
End: 2019-12-09

## 2019-12-09 ENCOUNTER — OFFICE VISIT (OUTPATIENT)
Dept: PSYCHIATRY | Facility: OTHER | Age: 44
End: 2019-12-09
Payer: COMMERCIAL

## 2019-12-09 VITALS
DIASTOLIC BLOOD PRESSURE: 78 MMHG | BODY MASS INDEX: 22.36 KG/M2 | TEMPERATURE: 98.5 F | SYSTOLIC BLOOD PRESSURE: 122 MMHG | WEIGHT: 134.2 LBS | HEIGHT: 65 IN | RESPIRATION RATE: 16 BRPM | HEART RATE: 84 BPM

## 2019-12-09 DIAGNOSIS — F60.3 BORDERLINE PERSONALITY DISORDER (H): ICD-10-CM

## 2019-12-09 DIAGNOSIS — Z51.81 ENCOUNTER FOR THERAPEUTIC DRUG MONITORING: Primary | ICD-10-CM

## 2019-12-09 DIAGNOSIS — F41.9 ANXIETY: ICD-10-CM

## 2019-12-09 DIAGNOSIS — F90.0 ATTENTION DEFICIT HYPERACTIVITY DISORDER (ADHD), PREDOMINANTLY INATTENTIVE TYPE: ICD-10-CM

## 2019-12-09 DIAGNOSIS — G47.00 INSOMNIA, UNSPECIFIED TYPE: ICD-10-CM

## 2019-12-09 DIAGNOSIS — F33.1 MODERATE EPISODE OF RECURRENT MAJOR DEPRESSIVE DISORDER (H): ICD-10-CM

## 2019-12-09 LAB
AMPHETAMINES UR QL: ABNORMAL NG/ML
BARBITURATES UR QL SCN: NOT DETECTED NG/ML
BENZODIAZ UR QL SCN: NOT DETECTED NG/ML
BUPRENORPHINE UR QL: NOT DETECTED NG/ML
CANNABINOIDS UR QL: ABNORMAL NG/ML
COCAINE UR QL SCN: NOT DETECTED NG/ML
D-METHAMPHET UR QL: NOT DETECTED NG/ML
METHADONE UR QL SCN: NOT DETECTED NG/ML
OPIATES UR QL SCN: NOT DETECTED NG/ML
OXYCODONE UR QL SCN: NOT DETECTED NG/ML
PCP UR QL SCN: NOT DETECTED NG/ML
PROPOXYPH UR QL: NOT DETECTED NG/ML
TRICYCLICS UR QL SCN: ABNORMAL NG/ML

## 2019-12-09 PROCEDURE — 99214 OFFICE O/P EST MOD 30 MIN: CPT | Performed by: PSYCHIATRY & NEUROLOGY

## 2019-12-09 PROCEDURE — 80306 DRUG TEST PRSMV INSTRMNT: CPT | Performed by: PSYCHIATRY & NEUROLOGY

## 2019-12-09 PROCEDURE — 99207 ZZC CDG-MDM COMPONENT: MEETS MODERATE - DOWN CODED: CPT | Performed by: PSYCHIATRY & NEUROLOGY

## 2019-12-09 RX ORDER — QUETIAPINE FUMARATE 25 MG/1
TABLET, FILM COATED ORAL
Qty: 90 TABLET | Refills: 1 | Status: SHIPPED | OUTPATIENT
Start: 2019-12-09 | End: 2020-01-09

## 2019-12-09 ASSESSMENT — ANXIETY QUESTIONNAIRES
1. FEELING NERVOUS, ANXIOUS, OR ON EDGE: MORE THAN HALF THE DAYS
7. FEELING AFRAID AS IF SOMETHING AWFUL MIGHT HAPPEN: SEVERAL DAYS
GAD7 TOTAL SCORE: 14
GAD7 TOTAL SCORE: 14
2. NOT BEING ABLE TO STOP OR CONTROL WORRYING: MORE THAN HALF THE DAYS
6. BECOMING EASILY ANNOYED OR IRRITABLE: NEARLY EVERY DAY
GAD7 TOTAL SCORE: 14
3. WORRYING TOO MUCH ABOUT DIFFERENT THINGS: MORE THAN HALF THE DAYS
5. BEING SO RESTLESS THAT IT IS HARD TO SIT STILL: SEVERAL DAYS
4. TROUBLE RELAXING: NEARLY EVERY DAY
7. FEELING AFRAID AS IF SOMETHING AWFUL MIGHT HAPPEN: SEVERAL DAYS

## 2019-12-09 ASSESSMENT — PATIENT HEALTH QUESTIONNAIRE - PHQ9
SUM OF ALL RESPONSES TO PHQ QUESTIONS 1-9: 13
SUM OF ALL RESPONSES TO PHQ QUESTIONS 1-9: 13
10. IF YOU CHECKED OFF ANY PROBLEMS, HOW DIFFICULT HAVE THESE PROBLEMS MADE IT FOR YOU TO DO YOUR WORK, TAKE CARE OF THINGS AT HOME, OR GET ALONG WITH OTHER PEOPLE: VERY DIFFICULT

## 2019-12-09 ASSESSMENT — MIFFLIN-ST. JEOR: SCORE: 1255.22

## 2019-12-09 NOTE — TELEPHONE ENCOUNTER
Called to inform the patient that her urine drug screen was not negative.  Patient's urine drug screen was presumptive positive for THC, amphetamine (expected because she is taking Adderall), and tricyclic antidepressants.    Per our office visit today, I would prescribe her Adderall if her urine drug screen was negative for marijuana.  I will not be prescribing her Adderall at this time.  It is recommended she follow-up in 4 to 6 weeks from today's visit and we will do another urine drug screen.  I also called to discuss her positive tricyclic antidepressant result.  Seroquel may show up as a TCA (false positive) on a drug screen but I just wanted to confirm she is no longer taking her amitriptyline as we discussed discontinuing this at last office visit.    Whitney Carl, DO on 12/9/2019 at 3:55 PM
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
## 2019-12-09 NOTE — PATIENT INSTRUCTIONS
Treatment Plan:    Continue Effexor 225 mg daily, lamotrigine 200 mg daily    Increase Seroquel to 12.5-25 mg daily PRN and 25-50 mg at bedtime for sleep as needed    Start Adderall 30 mg in AM and 15 mg in afternoon ([facility] in [place])    Drug Screen today    Continue all other cares per primary care provider.     Continue all other medications as reviewed per electronic medical record today.     Safety plan reviewed. To the Emergency Department as needed or call after hours crisis line at [phone] or [phone]. [place] Crisis Text [place]. Text [place] to 590869 or Suicide LifeLine Chat: suicidepreventionlifeline.org/chat    Continue to look into DBT options.     Schedule an appointment with me in 4 weeks or sooner as needed. Call [facility] at [phone] to schedule.    Follow up with primary care provider as planned or for acute medical concerns.    Call the psychiatric nurse line with medication questions or concerns at [phone].    [employer] may be used to communicate with your provider, but this is not intended to be used for emergencies.    Borderlinepersonalitydisorder.org  I hate You Don't Leave Me

## 2019-12-10 ASSESSMENT — ANXIETY QUESTIONNAIRES: GAD7 TOTAL SCORE: 14

## 2019-12-10 ASSESSMENT — PATIENT HEALTH QUESTIONNAIRE - PHQ9: SUM OF ALL RESPONSES TO PHQ QUESTIONS 1-9: 13

## 2020-01-09 ENCOUNTER — TELEPHONE (OUTPATIENT)
Dept: PSYCHIATRY | Facility: OTHER | Age: 45
End: 2020-01-09

## 2020-01-09 ENCOUNTER — OFFICE VISIT (OUTPATIENT)
Dept: PSYCHIATRY | Facility: OTHER | Age: 45
End: 2020-01-09
Payer: COMMERCIAL

## 2020-01-09 VITALS
BODY MASS INDEX: 21.34 KG/M2 | WEIGHT: 132.8 LBS | TEMPERATURE: 98.8 F | OXYGEN SATURATION: 100 % | SYSTOLIC BLOOD PRESSURE: 108 MMHG | DIASTOLIC BLOOD PRESSURE: 70 MMHG | HEIGHT: 66 IN | RESPIRATION RATE: 16 BRPM | HEART RATE: 88 BPM

## 2020-01-09 DIAGNOSIS — F33.1 MODERATE EPISODE OF RECURRENT MAJOR DEPRESSIVE DISORDER (H): ICD-10-CM

## 2020-01-09 DIAGNOSIS — F41.9 ANXIETY: ICD-10-CM

## 2020-01-09 DIAGNOSIS — F90.0 ATTENTION DEFICIT HYPERACTIVITY DISORDER (ADHD), PREDOMINANTLY INATTENTIVE TYPE: Primary | ICD-10-CM

## 2020-01-09 DIAGNOSIS — F60.3 BORDERLINE PERSONALITY DISORDER (H): ICD-10-CM

## 2020-01-09 DIAGNOSIS — G47.00 INSOMNIA, UNSPECIFIED TYPE: ICD-10-CM

## 2020-01-09 LAB
AMPHETAMINES UR QL: ABNORMAL NG/ML
BARBITURATES UR QL SCN: NOT DETECTED NG/ML
BENZODIAZ UR QL SCN: NOT DETECTED NG/ML
BUPRENORPHINE UR QL: NOT DETECTED NG/ML
CANNABINOIDS UR QL: NOT DETECTED NG/ML
COCAINE UR QL SCN: NOT DETECTED NG/ML
D-METHAMPHET UR QL: NOT DETECTED NG/ML
METHADONE UR QL SCN: NOT DETECTED NG/ML
OPIATES UR QL SCN: NOT DETECTED NG/ML
OXYCODONE UR QL SCN: NOT DETECTED NG/ML
PCP UR QL SCN: NOT DETECTED NG/ML
PROPOXYPH UR QL: NOT DETECTED NG/ML
TRICYCLICS UR QL SCN: NOT DETECTED NG/ML

## 2020-01-09 PROCEDURE — 80306 DRUG TEST PRSMV INSTRMNT: CPT | Performed by: PSYCHIATRY & NEUROLOGY

## 2020-01-09 PROCEDURE — 99214 OFFICE O/P EST MOD 30 MIN: CPT | Mod: 25 | Performed by: PSYCHIATRY & NEUROLOGY

## 2020-01-09 RX ORDER — DEXTROAMPHETAMINE SACCHARATE, AMPHETAMINE ASPARTATE, DEXTROAMPHETAMINE SULFATE AND AMPHETAMINE SULFATE 3.75; 3.75; 3.75; 3.75 MG/1; MG/1; MG/1; MG/1
15 TABLET ORAL
Qty: 30 TABLET | Refills: 0 | Status: SHIPPED | OUTPATIENT
Start: 2020-01-09 | End: 2020-03-19

## 2020-01-09 RX ORDER — LAMOTRIGINE 200 MG/1
200 TABLET ORAL DAILY
Qty: 30 TABLET | Refills: 1 | Status: SHIPPED | OUTPATIENT
Start: 2020-01-20 | End: 2020-03-19

## 2020-01-09 RX ORDER — DEXTROAMPHETAMINE SACCHARATE, AMPHETAMINE ASPARTATE, DEXTROAMPHETAMINE SULFATE AND AMPHETAMINE SULFATE 3.75; 3.75; 3.75; 3.75 MG/1; MG/1; MG/1; MG/1
15 TABLET ORAL
Qty: 30 TABLET | Refills: 0 | Status: SHIPPED | OUTPATIENT
Start: 2020-02-01 | End: 2020-03-19

## 2020-01-09 RX ORDER — QUETIAPINE FUMARATE 25 MG/1
TABLET, FILM COATED ORAL
Qty: 90 TABLET | Refills: 1 | Status: SHIPPED | OUTPATIENT
Start: 2020-01-20 | End: 2020-03-19

## 2020-01-09 RX ORDER — VENLAFAXINE HYDROCHLORIDE 75 MG/1
75 CAPSULE, EXTENDED RELEASE ORAL DAILY
Qty: 30 CAPSULE | Refills: 1 | Status: SHIPPED | OUTPATIENT
Start: 2020-01-20 | End: 2020-03-19

## 2020-01-09 RX ORDER — VENLAFAXINE HYDROCHLORIDE 150 MG/1
CAPSULE, EXTENDED RELEASE ORAL
Qty: 30 CAPSULE | Refills: 1 | Status: SHIPPED | OUTPATIENT
Start: 2020-01-20 | End: 2020-01-09

## 2020-01-09 RX ORDER — DEXTROAMPHETAMINE SACCHARATE, AMPHETAMINE ASPARTATE, DEXTROAMPHETAMINE SULFATE AND AMPHETAMINE SULFATE 7.5; 7.5; 7.5; 7.5 MG/1; MG/1; MG/1; MG/1
30 TABLET ORAL DAILY
Qty: 30 TABLET | Refills: 0 | Status: SHIPPED | OUTPATIENT
Start: 2020-02-01 | End: 2020-03-19

## 2020-01-09 RX ORDER — DEXTROAMPHETAMINE SACCHARATE, AMPHETAMINE ASPARTATE, DEXTROAMPHETAMINE SULFATE AND AMPHETAMINE SULFATE 7.5; 7.5; 7.5; 7.5 MG/1; MG/1; MG/1; MG/1
30 TABLET ORAL DAILY
Qty: 30 TABLET | Refills: 0 | Status: SHIPPED | OUTPATIENT
Start: 2020-01-09 | End: 2020-03-19

## 2020-01-09 RX ORDER — VENLAFAXINE HYDROCHLORIDE 150 MG/1
150 CAPSULE, EXTENDED RELEASE ORAL DAILY
Qty: 30 CAPSULE | Refills: 1 | Status: SHIPPED | OUTPATIENT
Start: 2020-01-09 | End: 2020-03-19

## 2020-01-09 ASSESSMENT — ANXIETY QUESTIONNAIRES
4. TROUBLE RELAXING: SEVERAL DAYS
5. BEING SO RESTLESS THAT IT IS HARD TO SIT STILL: SEVERAL DAYS
1. FEELING NERVOUS, ANXIOUS, OR ON EDGE: MORE THAN HALF THE DAYS
6. BECOMING EASILY ANNOYED OR IRRITABLE: MORE THAN HALF THE DAYS
3. WORRYING TOO MUCH ABOUT DIFFERENT THINGS: MORE THAN HALF THE DAYS
GAD7 TOTAL SCORE: 10
GAD7 TOTAL SCORE: 10
7. FEELING AFRAID AS IF SOMETHING AWFUL MIGHT HAPPEN: NOT AT ALL
2. NOT BEING ABLE TO STOP OR CONTROL WORRYING: MORE THAN HALF THE DAYS
7. FEELING AFRAID AS IF SOMETHING AWFUL MIGHT HAPPEN: NOT AT ALL
GAD7 TOTAL SCORE: 10

## 2020-01-09 ASSESSMENT — PATIENT HEALTH QUESTIONNAIRE - PHQ9
SUM OF ALL RESPONSES TO PHQ QUESTIONS 1-9: 14
SUM OF ALL RESPONSES TO PHQ QUESTIONS 1-9: 14
10. IF YOU CHECKED OFF ANY PROBLEMS, HOW DIFFICULT HAVE THESE PROBLEMS MADE IT FOR YOU TO DO YOUR WORK, TAKE CARE OF THINGS AT HOME, OR GET ALONG WITH OTHER PEOPLE: VERY DIFFICULT

## 2020-01-09 ASSESSMENT — PAIN SCALES - GENERAL: PAINLEVEL: NO PAIN (0)

## 2020-01-09 ASSESSMENT — MIFFLIN-ST. JEOR: SCORE: 1252

## 2020-01-09 NOTE — PATIENT INSTRUCTIONS
Treatment Plan:    Continue Effexor 225 mg daily, lamotrigine 200 mg daily    Continue Seroquel 12.5-25 mg daily PRN and 25-50 mg at bedtime for sleep as needed    Adderall 30 mg in AM and 15 mg in afternoon ([facility] in [place]) if negative drug screen    Drug Screen today     Continue all other cares per primary care provider.     Continue all other medications as reviewed per electronic medical record today.     Safety plan reviewed. To the Emergency Department as needed or call after hours crisis line at [phone] or [phone]. [place] Crisis Text [place]. Text [place] to 609925 or Suicide LifeLine Chat: suicidepreventionlifeline.org/chat    Continue to seek therapy as planned.     Schedule an appointment with me in 8 weeks or sooner as needed. Call [facility] at [phone] to schedule.    Follow up with primary care provider as planned or for acute medical concerns.    Call the psychiatric nurse line with medication questions or concerns at [phone].    [employer]hart may be used to communicate with your provider, but this is not intended to be used for emergencies.

## 2020-01-09 NOTE — TELEPHONE ENCOUNTER
Reason for call:  Medication issue   If this is a refill request, has the caller requested the refill from the pharmacy already? N/A  Will the patient be using a [facility]? N/A  Name of the pharmacy and phone number for the current request: [facility] [phone]    Name of the medication requested: Effexor 150mg XR script from today, pharmacy needs new rx sent with clear directive on the rx     Other request: script from today, pharmacy needs new rx sent with clear directive on the rx       Phone number to reach patient:  Other phone number:    [phone]    Can we leave a detailed message on this number?  Not Applicable
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
## 2020-01-10 ASSESSMENT — PATIENT HEALTH QUESTIONNAIRE - PHQ9: SUM OF ALL RESPONSES TO PHQ QUESTIONS 1-9: 14

## 2020-01-10 ASSESSMENT — ANXIETY QUESTIONNAIRES: GAD7 TOTAL SCORE: 10

## 2020-01-13 ENCOUNTER — MYC MEDICAL ADVICE (OUTPATIENT)
Dept: PSYCHIATRY | Facility: OTHER | Age: 45
End: 2020-01-13

## 2020-02-24 ENCOUNTER — HEALTH MAINTENANCE LETTER (OUTPATIENT)
Age: 45
End: 2020-02-24

## 2020-03-19 ENCOUNTER — VIRTUAL VISIT (OUTPATIENT)
Dept: PSYCHIATRY | Facility: OTHER | Age: 45
End: 2020-03-19
Payer: COMMERCIAL

## 2020-03-19 DIAGNOSIS — G47.00 INSOMNIA, UNSPECIFIED TYPE: ICD-10-CM

## 2020-03-19 DIAGNOSIS — F41.9 ANXIETY: ICD-10-CM

## 2020-03-19 DIAGNOSIS — F90.0 ATTENTION DEFICIT HYPERACTIVITY DISORDER (ADHD), PREDOMINANTLY INATTENTIVE TYPE: ICD-10-CM

## 2020-03-19 DIAGNOSIS — F33.1 MODERATE EPISODE OF RECURRENT MAJOR DEPRESSIVE DISORDER (H): Primary | ICD-10-CM

## 2020-03-19 DIAGNOSIS — Z79.899 LONG TERM CURRENT USE OF ANTIPSYCHOTIC MEDICATION: ICD-10-CM

## 2020-03-19 PROCEDURE — 99441 ZZC PHYSICIAN TELEPHONE EVALUATION 5-10 MIN: CPT | Performed by: PSYCHIATRY & NEUROLOGY

## 2020-03-19 RX ORDER — DEXTROAMPHETAMINE SACCHARATE, AMPHETAMINE ASPARTATE, DEXTROAMPHETAMINE SULFATE AND AMPHETAMINE SULFATE 7.5; 7.5; 7.5; 7.5 MG/1; MG/1; MG/1; MG/1
30 TABLET ORAL DAILY
Qty: 30 TABLET | Refills: 0 | Status: SHIPPED | OUTPATIENT
Start: 2020-03-19 | End: 2020-06-08

## 2020-03-19 RX ORDER — DEXTROAMPHETAMINE SACCHARATE, AMPHETAMINE ASPARTATE, DEXTROAMPHETAMINE SULFATE AND AMPHETAMINE SULFATE 3.75; 3.75; 3.75; 3.75 MG/1; MG/1; MG/1; MG/1
15 TABLET ORAL
Qty: 30 TABLET | Refills: 0 | Status: SHIPPED | OUTPATIENT
Start: 2020-04-18 | End: 2020-06-08

## 2020-03-19 RX ORDER — DEXTROAMPHETAMINE SACCHARATE, AMPHETAMINE ASPARTATE, DEXTROAMPHETAMINE SULFATE AND AMPHETAMINE SULFATE 7.5; 7.5; 7.5; 7.5 MG/1; MG/1; MG/1; MG/1
30 TABLET ORAL DAILY
Qty: 30 TABLET | Refills: 0 | Status: SHIPPED | OUTPATIENT
Start: 2020-04-18 | End: 2020-06-08

## 2020-03-19 RX ORDER — VENLAFAXINE HYDROCHLORIDE 75 MG/1
75 CAPSULE, EXTENDED RELEASE ORAL DAILY
Qty: 30 CAPSULE | Refills: 1 | Status: SHIPPED | OUTPATIENT
Start: 2020-03-19 | End: 2020-06-04

## 2020-03-19 RX ORDER — LAMOTRIGINE 200 MG/1
200 TABLET ORAL DAILY
Qty: 30 TABLET | Refills: 1 | Status: SHIPPED | OUTPATIENT
Start: 2020-03-19 | End: 2020-06-04

## 2020-03-19 RX ORDER — DEXTROAMPHETAMINE SACCHARATE, AMPHETAMINE ASPARTATE, DEXTROAMPHETAMINE SULFATE AND AMPHETAMINE SULFATE 3.75; 3.75; 3.75; 3.75 MG/1; MG/1; MG/1; MG/1
15 TABLET ORAL
Qty: 30 TABLET | Refills: 0 | Status: SHIPPED | OUTPATIENT
Start: 2020-03-19 | End: 2020-06-04

## 2020-03-19 RX ORDER — QUETIAPINE FUMARATE 25 MG/1
TABLET, FILM COATED ORAL
Qty: 90 TABLET | Refills: 1 | Status: SHIPPED | OUTPATIENT
Start: 2020-03-19 | End: 2020-09-11

## 2020-03-19 RX ORDER — VENLAFAXINE HYDROCHLORIDE 150 MG/1
150 CAPSULE, EXTENDED RELEASE ORAL DAILY
Qty: 30 CAPSULE | Refills: 1 | Status: SHIPPED | OUTPATIENT
Start: 2020-03-19 | End: 2020-06-04

## 2020-03-19 ASSESSMENT — ANXIETY QUESTIONNAIRES
1. FEELING NERVOUS, ANXIOUS, OR ON EDGE: SEVERAL DAYS
IF YOU CHECKED OFF ANY PROBLEMS ON THIS QUESTIONNAIRE, HOW DIFFICULT HAVE THESE PROBLEMS MADE IT FOR YOU TO DO YOUR WORK, TAKE CARE OF THINGS AT HOME, OR GET ALONG WITH OTHER PEOPLE: SOMEWHAT DIFFICULT
6. BECOMING EASILY ANNOYED OR IRRITABLE: MORE THAN HALF THE DAYS
7. FEELING AFRAID AS IF SOMETHING AWFUL MIGHT HAPPEN: NOT AT ALL
GAD7 TOTAL SCORE: 11
2. NOT BEING ABLE TO STOP OR CONTROL WORRYING: MORE THAN HALF THE DAYS
3. WORRYING TOO MUCH ABOUT DIFFERENT THINGS: MORE THAN HALF THE DAYS
5. BEING SO RESTLESS THAT IT IS HARD TO SIT STILL: SEVERAL DAYS

## 2020-03-19 ASSESSMENT — PATIENT HEALTH QUESTIONNAIRE - PHQ9
5. POOR APPETITE OR OVEREATING: NEARLY EVERY DAY
SUM OF ALL RESPONSES TO PHQ QUESTIONS 1-9: 6

## 2020-03-19 NOTE — Clinical Note
Please note this patient's psychiatric care is being discharged from me and returned to their primary care provider. Thanks!

## 2020-03-19 NOTE — Clinical Note
Psychiatry Update/Consult. I am sending Marjorie's psychiatric care back to you for ongoing care and medication prescribing.  Future medication refills will come from you. I recommended that the patient follow up with you in about 2months (refills given until then). More details about treatment plan are in my note. If you have any questions or concerns, please let me know. The patient can be referred back to this service for further consultation as needed.    Thanks for the referral!    - Whitney

## 2020-03-19 NOTE — PROGRESS NOTES
"Marjorie Holbrook is a 44 year old female who is being evaluated via a billable telephone visit.      The patient has been notified of following:     \"This telephone visit will be conducted via a call between you and your physician/provider. We have found that certain health care needs can be provided without the need for a physical exam.  This service lets us provide the care you need with a short phone conversation.  If a prescription is necessary we can send it directly to your pharmacy.  If lab work is needed we can place an order for that and you can then stop by our lab to have the test done at a later time.    If during the course of the call the physician/provider feels a telephone visit is not appropriate, you will not be charged for this service.\"     Marjorie Holbrook complains of  No chief complaint on file.      I have reviewed and updated the patient's Past Medical History, Social History, Family History and Medication List.    ALLERGIES  Metronidazole    Additional provider notes: Elly Delgado, OBDULIO       Continued Effexor 225 mg daily, lamotrigine 200 mg daily    Continue Seroquel 12.5-25 mg daily PRN and 25-50 mg at bedtime for sleep as needed    Adderall 30 mg in AM and 15 mg in afternoon ([facility] in [place]) if negative drug screen    Drug Screen today     Seroquel, every night up to 75 mg bedtime  THC a couple times while on vacation in [place]    Assessment/Plan:  {Diagnosis, Associated Orders and Comment:884512}    Phone call duration: *** minutes    {signature options:860770}      "

## 2020-03-19 NOTE — PROGRESS NOTES
"Marjorie Holbrook is a 44 year old female who is being evaluated via a billable telephone visit.      The patient has been notified of following:     \"This telephone visit will be conducted via a call between you and your physician/provider. We have found that certain health care needs can be provided without the need for a physical exam.  This service lets us provide the care you need with a short phone conversation.  If a prescription is necessary we can send it directly to your pharmacy.  If lab work is needed we can place an order for that and you can then stop by our lab to have the test done at a later time.    If during the course of the call the physician/provider feels a telephone visit is not appropriate, you will not be charged for this service.\"     Marjorie Holbrook complains of    Chief Complaint   Patient presents with     Recheck Medication     JORDAN-7 SCORE 12/9/2019 1/9/2020 3/19/2020   Total Score - - -   Total Score 14 (moderate anxiety) 10 (moderate anxiety) -   Total Score 14 10 11     PHQ 12/9/2019 1/9/2020 3/19/2020   PHQ-9 Total Score 13 14 6   Q9: Thoughts of better off dead/self-harm past 2 weeks Not at all Several days Not at all   F/U: Thoughts of suicide or self-harm - Yes -   F/U: Self harm-plan - Yes -   F/U: Self-harm action - Yes -   F/U: Safety concerns - No -     I have reviewed and updated the patient's Past Medical History, Social History, Family History and Medication List.    ALLERGIES  Metronidazole    Additional provider notes:   Pt last seen in clinic for follow-up visit on 1/9/2020 at which time we:    Continued Effexor 225 mg daily, lamotrigine 200 mg daily    Continued Seroquel 12.5-25 mg daily PRN and 25-50 mg at bedtime for sleep as needed    Continued Adderall 30 mg in AM and 15 mg in afternoon ([facility] in [place]) if negative drug screen    Drug Screen today (was negative at that visit)    Today pt reports overall improvement in her sxs since last visit. She recently " went on vacation to [place] for a couple weeks and had a good trip. 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 Focus and concentration are good on Adderall dose and she does not have any chest pain, racing heart, tics, trouble sleeping, or poor appetite. Mood has been good and anxiety has been manageable. Sleeping well with Seroquel and does admit to sometimes taking 75 mg at bedtime. Very rare social THC use (last was in [place] and none for two weeks). No change in drinking or other drug use. No safety concerns. Denies SI and denies any thoughts of self-harm. Pt feels stable and is appreciative of care. Agreeable to returning her care to pcp at this time.      Assessment/Plan:  Attention-Deficit/Hyperactivity Disorder  314.00 (F90.0) Predominantly inattentive presentation  296.32 (F33.1) Major Depressive Disorder, Recurrent Episode, In partial remission  300.09 (F41.8) Other Specified Anxiety Disorder   301.83 (F60.3) Borderline Personality Disorder   Insomnia, unspecified, in partial remission on Seroquel    Pt continues to do well and remain stable on current regimen. She is using Seroquel on a regular basis for sleep which I hope she can slowly decrease use and wean self off due to risk of metabolic and movement disorders. Could trial other options for sleep such as other meds or CBT-I (sleep psychologist referral for cognitive behavioral therapy for insomnia). She is aware of risks of Seroquel (granted she is on a somewhat low dose). Labs ordered so she can have fasted labs drawn at some point in the next month or two.     Check lipid and fasting glucose about once a year if she remains on Seroquel and next set of labs are normal. Watch for weight gain.      Treatment Plan:    Continue Effexor 225 mg daily for mood and anxiety    Continue lamotrigine 200 mg daily for mood stability    Continue Seroquel 12.5-25 mg daily PRN and 25-50 mg at  bedtime for sleep as needed    Continue Adderall 30 mg in AM and 15 mg in afternoon for ADD/ADHD    Fasting labs ordered for draw within next month or so (lipids and glucose due to Seroquel use)    Continue all other cares per primary care provider.     Continue all other medications as reviewed per electronic medical record today.     Safety plan reviewed. To the Emergency Department as needed or call after hours crisis line at [phone] or [phone]. [place] Crisis Text Line. Text [place] to 776382 or Suicide LifeLine Chat: suicidepreventionlifeline.org/chat    Continue to consider therapy if finances and time allow.     Follow up with primary care provider as planned or in 2 months for mental health follow-up and also for any acute medical concerns.    [employer] may be used to communicate with your provider, but this is not intended to be used for emergencies.    Phone call duration: 8 minutes   Start: 10:33am  End: 10:41am    Whitney Carl, DO  [facility]    Pt's psychiatric care being returned to her pcp at this time due to stability of sxs. All medication refills will now come from pt's pcp. Pt can be referred again to our service at any time.

## 2020-03-20 ASSESSMENT — ANXIETY QUESTIONNAIRES: GAD7 TOTAL SCORE: 11

## 2020-03-20 NOTE — PATIENT INSTRUCTIONS
Treatment Plan:    Continue Effexor 225 mg daily for mood and anxiety    Continue lamotrigine 200 mg daily for mood stability    Continue Seroquel 12.5-25 mg daily PRN and 25-50 mg at bedtime for sleep as needed    Continue Adderall 30 mg in AM and 15 mg in afternoon for ADD/ADHD    Fasting labs ordered for draw within next month or so (lipids and glucose due to Seroquel use)    Continue all other cares per primary care provider.     Continue all other medications as reviewed per electronic medical record today.     Safety plan reviewed. To the Emergency Department as needed or call after hours crisis line at [phone] or [phone]. [place] Crisis Text [place]. Text [place] to 977890 or Suicide LifeLine Chat: suicidepreventionlifeline.org/chat    Continue to consider therapy if finances and time allow.     Follow up with primary care provider as planned or in 2 months for mental health follow-up and also for any acute medical concerns.    [facility] may be used to communicate with your provider, but this is not intended to be used for emergencies.    Thank you for our work together in the Psychiatry Collaborative Care Model at [facility] This is our last visit and I am returning your care back to your Primary Care Provider Ovidio Jenkins Mai, MD . If you are not doing well, please contact your Primary Care Provider office.

## 2020-06-01 DIAGNOSIS — F90.0 ATTENTION DEFICIT HYPERACTIVITY DISORDER (ADHD), PREDOMINANTLY INATTENTIVE TYPE: ICD-10-CM

## 2020-06-03 NOTE — TELEPHONE ENCOUNTER
Patient was returned to PCP for medication management and refills at last visit on 3/19.  Routing refill request to PCP for review.    Judy Burger RN    Nurse Liaison  [facility]

## 2020-06-04 DIAGNOSIS — F41.9 ANXIETY: ICD-10-CM

## 2020-06-04 DIAGNOSIS — F33.1 MODERATE EPISODE OF RECURRENT MAJOR DEPRESSIVE DISORDER (H): ICD-10-CM

## 2020-06-04 DIAGNOSIS — F90.0 ATTENTION DEFICIT HYPERACTIVITY DISORDER (ADHD), PREDOMINANTLY INATTENTIVE TYPE: ICD-10-CM

## 2020-06-04 RX ORDER — VENLAFAXINE HYDROCHLORIDE 75 MG/1
75 CAPSULE, EXTENDED RELEASE ORAL DAILY
Qty: 30 CAPSULE | Refills: 1 | Status: SHIPPED | OUTPATIENT
Start: 2020-06-04 | End: 2020-08-04

## 2020-06-04 RX ORDER — DEXTROAMPHETAMINE SACCHARATE, AMPHETAMINE ASPARTATE, DEXTROAMPHETAMINE SULFATE AND AMPHETAMINE SULFATE 3.75; 3.75; 3.75; 3.75 MG/1; MG/1; MG/1; MG/1
15 TABLET ORAL
Qty: 30 TABLET | Refills: 0 | Status: CANCELLED | OUTPATIENT
Start: 2020-06-04

## 2020-06-04 RX ORDER — DEXTROAMPHETAMINE SACCHARATE, AMPHETAMINE ASPARTATE, DEXTROAMPHETAMINE SULFATE AND AMPHETAMINE SULFATE 7.5; 7.5; 7.5; 7.5 MG/1; MG/1; MG/1; MG/1
30 TABLET ORAL DAILY
Qty: 30 TABLET | Refills: 0 | Status: CANCELLED | OUTPATIENT
Start: 2020-06-04

## 2020-06-04 RX ORDER — LAMOTRIGINE 200 MG/1
200 TABLET ORAL DAILY
Qty: 30 TABLET | Refills: 1 | Status: SHIPPED | OUTPATIENT
Start: 2020-06-04 | End: 2020-08-04

## 2020-06-04 RX ORDER — DEXTROAMPHETAMINE SACCHARATE, AMPHETAMINE ASPARTATE, DEXTROAMPHETAMINE SULFATE AND AMPHETAMINE SULFATE 3.75; 3.75; 3.75; 3.75 MG/1; MG/1; MG/1; MG/1
15 TABLET ORAL
Qty: 30 TABLET | Refills: 0 | Status: SHIPPED | OUTPATIENT
Start: 2020-06-04 | End: 2020-09-11

## 2020-06-04 RX ORDER — VENLAFAXINE HYDROCHLORIDE 150 MG/1
150 CAPSULE, EXTENDED RELEASE ORAL DAILY
Qty: 30 CAPSULE | Refills: 1 | Status: SHIPPED | OUTPATIENT
Start: 2020-06-04 | End: 2020-08-04

## 2020-06-04 NOTE — TELEPHONE ENCOUNTER
Ricardo Saenz MD  [facility] 4 minutes ago (12:40 PM)       Needs pcp follow up    Routing comment      appt made.  Katiuska Fierro MA 6/4/2020

## 2020-06-08 ENCOUNTER — VIRTUAL VISIT (OUTPATIENT)
Dept: FAMILY MEDICINE | Facility: CLINIC | Age: 45
End: 2020-06-08
Payer: COMMERCIAL

## 2020-06-08 DIAGNOSIS — F60.3 BORDERLINE PERSONALITY DISORDER (H): ICD-10-CM

## 2020-06-08 DIAGNOSIS — F33.1 MODERATE EPISODE OF RECURRENT MAJOR DEPRESSIVE DISORDER (H): ICD-10-CM

## 2020-06-08 DIAGNOSIS — F90.0 ATTENTION DEFICIT HYPERACTIVITY DISORDER (ADHD), PREDOMINANTLY INATTENTIVE TYPE: Primary | ICD-10-CM

## 2020-06-08 DIAGNOSIS — G47.00 INSOMNIA, UNSPECIFIED TYPE: ICD-10-CM

## 2020-06-08 PROCEDURE — 99214 OFFICE O/P EST MOD 30 MIN: CPT | Mod: TEL | Performed by: FAMILY MEDICINE

## 2020-06-08 RX ORDER — VENLAFAXINE HYDROCHLORIDE 75 MG/1
75 CAPSULE, EXTENDED RELEASE ORAL DAILY
Qty: 30 CAPSULE | Refills: 1 | Status: CANCELLED | OUTPATIENT
Start: 2020-06-08

## 2020-06-08 RX ORDER — QUETIAPINE FUMARATE 25 MG/1
TABLET, FILM COATED ORAL
Qty: 90 TABLET | Refills: 1 | Status: CANCELLED | OUTPATIENT
Start: 2020-06-08

## 2020-06-08 RX ORDER — LAMOTRIGINE 200 MG/1
200 TABLET ORAL DAILY
Qty: 30 TABLET | Refills: 1 | Status: CANCELLED | OUTPATIENT
Start: 2020-06-08

## 2020-06-08 RX ORDER — DEXTROAMPHETAMINE SACCHARATE, AMPHETAMINE ASPARTATE, DEXTROAMPHETAMINE SULFATE AND AMPHETAMINE SULFATE 3.75; 3.75; 3.75; 3.75 MG/1; MG/1; MG/1; MG/1
15 TABLET ORAL
Qty: 30 TABLET | Refills: 0 | Status: CANCELLED | OUTPATIENT
Start: 2020-06-08

## 2020-06-08 RX ORDER — VENLAFAXINE HYDROCHLORIDE 150 MG/1
150 CAPSULE, EXTENDED RELEASE ORAL DAILY
Qty: 30 CAPSULE | Refills: 1 | Status: CANCELLED | OUTPATIENT
Start: 2020-06-08

## 2020-06-08 RX ORDER — DEXTROAMPHETAMINE SACCHARATE, AMPHETAMINE ASPARTATE, DEXTROAMPHETAMINE SULFATE AND AMPHETAMINE SULFATE 7.5; 7.5; 7.5; 7.5 MG/1; MG/1; MG/1; MG/1
30 TABLET ORAL DAILY
Qty: 30 TABLET | Refills: 0 | Status: SHIPPED | OUTPATIENT
Start: 2020-06-08 | End: 2020-08-05

## 2020-06-08 ASSESSMENT — PAIN SCALES - GENERAL: PAINLEVEL: NO PAIN (1)

## 2020-06-08 ASSESSMENT — PATIENT HEALTH QUESTIONNAIRE - PHQ9: SUM OF ALL RESPONSES TO PHQ QUESTIONS 1-9: 9

## 2020-06-08 NOTE — PROGRESS NOTES
"Marjorie Holbrook is a 44 year old female who is being evaluated via a billable telephone visit.      The patient has been notified of following:     \"This telephone visit will be conducted via a call between you and your physician/provider. We have found that certain health care needs can be provided without the need for a physical exam.  This service lets us provide the care you need with a short phone conversation.  If a prescription is necessary we can send it directly to your pharmacy.  If lab work is needed we can place an order for that and you can then stop by our lab to have the test done at a later time.    Telephone visits are billed at different rates depending on your insurance coverage. During this emergency period, for some insurers they may be billed the same as an in-person visit.  Please reach out to your insurance provider with any questions.    If during the course of the call the physician/provider feels a telephone visit is not appropriate, you will not be charged for this service.\"    Patient has given verbal consent for Telephone visit?  Yes    What phone number would you like to be contacted at? [phone]    How would you like to obtain your AVS? MyChart    Subjective     Marjorie Holbrook is a 44 year old female who presents via phone visit today for the following health issues:    YASMINE Amador was seen today for ADHD follow-up and depression follow-up.  She has been seeing Dr. Carl, a psychiatrist who has been treating her for ADHD, major depression, borderline personality disorder and insomnia.  Been taking the medications as prescribed which include Adderall, Effexor, Lamictal and Seroquel with no side effect.  She feels the medications are effective and overall she is feeling good.  Denied of being depressed and her anxiety is well controlled.  Since her conditions are stable, she was told that she is no longer needed to see the psychiatrist and the medication can be refilled through " this office.  She needs a refill for Adderall.  Denied of headache or dizziness.  No chest pain or shortness of breath.  No heart palpitation or unintended weight loss.  No problems with sleeping with Seroquel.  No family history of cardiomyopathy.  Denied of drugs or alcohol use.  Just changed job and and it is less stressful.  No suicidal or homicidal ideation.  No hallucination.  No other concerns today.      Current Outpatient Medications   Medication Sig Dispense Refill     amphetamine-dextroamphetamine (ADDERALL) 15 MG tablet Take 1 tablet (15 mg) by mouth daily at 2 pm Needs to see PCP for further fills 30 tablet 0     amphetamine-dextroamphetamine (ADDERALL) 30 MG tablet Take 1 tablet (30 mg) by mouth daily 30 tablet 0     lamoTRIgine (LAMICTAL) 200 MG tablet Take 1 tablet (200 mg) by mouth daily 30 tablet 1     QUEtiapine (SEROQUEL) 25 MG tablet Take 12.5-25 mg by mouth during day as needed for anxiety and take 25-50 mg at bedtime for sleep as needed. 90 tablet 1     venlafaxine (EFFEXOR-XR) 150 MG 24 hr capsule Take 1 capsule (150 mg) by mouth daily Take with 75 mg tablet to total 225 daily. 30 capsule 1     venlafaxine (EFFEXOR-XR) 75 MG 24 hr capsule Take 1 capsule (75 mg) by mouth daily 30 capsule 1     Allergies   Allergen Reactions     Metronidazole Nausea and Vomiting     Recent Labs   Lab Test 11/11/19  1026 06/18/18  1631 11/03/16  1218   A1C 4.9  --   --    LDL 82  --   --    HDL 83  --   --    TRIG 45  --   --    ALT  --   --  15   CR  --  0.64 0.71   GFRESTIMATED  --  >90 89   GFRESTBLACK  --  >90 >90  African American GFR Calc     POTASSIUM  --  4.0 4.1   TSH  --  0.66 1.00      BP Readings from Last 3 Encounters:   01/09/20 108/70   12/09/19 122/78   11/11/19 122/64    Wt Readings from Last 3 Encounters:   01/09/20 60.2 kg (132 lb 12.8 oz)   12/09/19 60.9 kg (134 lb 3.2 oz)   11/11/19 61.2 kg (135 lb)                    Reviewed and updated as needed this visit by Provider         Review of  Systems   Constitutional, HEENT, cardiovascular, pulmonary, gi and gu systems are negative, except as otherwise noted.       Objective   Reported vitals:  LMP 08/21/2013    healthy, alert and no distress  PSYCH: Alert and oriented times 3; coherent speech, normal   rate and volume, able to articulate logical thoughts, able   to abstract reason, no tangential thoughts, no hallucinations   or delusions  Her affect is normal  RESP: No cough, no audible wheezing, able to talk in full sentences  Remainder of exam unable to be completed due to telephone visits    Diagnostic Test Results:  Labs reviewed in Epic  No results found for any visits on 06/08/20.        Assessment/Plan:    ICD-10-CM    1. Attention deficit hyperactivity disorder (ADHD), predominantly inattentive type  F90.0 amphetamine-dextroamphetamine (ADDERALL) 30 MG tablet   2. Moderate episode of recurrent major depressive disorder (H)  F33.1    3. Borderline personality disorder (H)  F60.3    4. Insomnia, unspecified type  G47.00      Overall Marjorie is doing well. I reviewed the medical record from Dr. Carl in details.  She tolerated medications well and they are effective.  Continue with the current dosse of the Adderall, Seroquel, Lamictal and Effexor.  Emphasized on the importance of healthy diet, daily exercise and adequate rest/water intake.  Encouraged to avoid high caffeine/sugar intake.  The Adderall was refilled today - no contraindication identified.  Follow-up in 3-4 months for physical and follow-up depression/ADHD.  Symptoms that need to be seen or call in discussed.  All of her questions were answered.    Return in about 3 months (around 9/8/2020) for Physical Exam, folow up depression/adhd.      Phone call duration:  7 minutes    Ovidio Jenkins Mai, MD

## 2020-08-01 DIAGNOSIS — F33.1 MODERATE EPISODE OF RECURRENT MAJOR DEPRESSIVE DISORDER (H): ICD-10-CM

## 2020-08-01 DIAGNOSIS — F41.9 ANXIETY: ICD-10-CM

## 2020-08-04 RX ORDER — VENLAFAXINE HYDROCHLORIDE 75 MG/1
CAPSULE, EXTENDED RELEASE ORAL
Qty: 30 CAPSULE | Refills: 1 | Status: SHIPPED | OUTPATIENT
Start: 2020-08-04 | End: 2020-09-11

## 2020-08-04 RX ORDER — LAMOTRIGINE 200 MG/1
TABLET ORAL
Qty: 30 TABLET | Refills: 1 | Status: SHIPPED | OUTPATIENT
Start: 2020-08-04 | End: 2020-09-11

## 2020-08-04 RX ORDER — VENLAFAXINE HYDROCHLORIDE 150 MG/1
CAPSULE, EXTENDED RELEASE ORAL
Qty: 30 CAPSULE | Refills: 1 | Status: SHIPPED | OUTPATIENT
Start: 2020-08-04 | End: 2020-09-11

## 2020-08-04 NOTE — TELEPHONE ENCOUNTER
EFFEXOR 75 mg   Last Written Prescription Date:  6/4/20  Last Fill Quantity: 30,  # refills: 0   Last office visit: 4/22/2019 with prescribing provider:  Dr. Malu Wilkerson Office Visit:  NONE    EFFEXOR 150mg  Last Written Prescription Date:  6/4/20  Last Fill Quantity: 30,  # refills: 0   Last office visit: 4/22/2019 with prescribing provider:  Dr. Malu Wilkerson Office Visit:  NONE    Lamictal 200 mt  Last Written Prescription Date:  6/4/20  Last Fill Quantity: 30,  # refills: 0   Last office visit: 4/22/2019 with prescribing provider: Dr. Malu Wilkerson Office Visit:  NONE    Requested Prescriptions   Pending Prescriptions Disp Refills     venlafaxine (EFFEXOR-XR) 75 MG 24 hr capsule [Pharmacy Med Name: VENLAFAXINE HCL ER 75MG CP24] 30 capsule 1     Sig: TAKE ONE CAPSULE BY MOUTH ONCE DAILY       Serotonin-Norepinephrine Reuptake Inhibitors  Failed - 8/1/2020  1:01 AM        Failed - PHQ-9 score of less than 5 in past 6 months     Please review last PHQ-9 score.     PHQ 1/9/2020 3/19/2020 6/8/2020   PHQ-9 Total Score 14 6 9   Q9: Thoughts of better off dead/self-harm past 2 weeks Several days Not at all Not at all   F/U: Thoughts of suicide or self-harm Yes - -   F/U: Self harm-plan Yes - -   F/U: Self-harm action Yes - -   F/U: Safety concerns No - -             Failed - Normal serum creatinine on file in past 12 months     Recent Labs   Lab Test 06/18/18  1631   CR 0.64     Ok to refill medication if creatinine is low        Passed - Blood pressure under 140/90 in past 12 months     BP Readings from Last 3 Encounters:   01/09/20 108/70   12/09/19 122/78   11/11/19 122/64           Passed - Medication is active on med list        Passed - Patient is age 18 or older        Passed - No active pregnancy on record        Passed - No positive pregnancy test in past 12 months        Passed - Recent (6 mo) or future (30 days) visit within the authorizing provider's specialty     Patient had office visit in the last 6 months  "or has a visit in the next 30 days with authorizing provider or within the authorizing provider's specialty.  See \"Patient Info\" tab in inbasket, or \"Choose Columns\" in Meds & Orders section of the refill encounter.               venlafaxine (EFFEXOR-XR) 150 MG 24 hr capsule [Pharmacy Med Name: VENLAFAXINE HCL ER 150MG CP24] 30 capsule 1     Sig: TAKE ONE CAPSULE BY MOUTH ONCE DAILY. TAKE WITH 75MG CAPSULE TO TOTAL 225 DAILY DOSE       Serotonin-Norepinephrine Reuptake Inhibitors  Failed - 8/1/2020  1:01 AM        Failed - PHQ-9 score of less than 5 in past 6 months     Please review last PHQ-9 score.           Failed - Normal serum creatinine on file in past 12 months     Recent Labs   Lab Test 06/18/18  1631   CR 0.64     Ok to refill medication if creatinine is low          Passed - Blood pressure under 140/90 in past 12 months     BP Readings from Last 3 Encounters:   01/09/20 108/70   12/09/19 122/78   11/11/19 122/64                 Passed - Medication is active on med list        Passed - Patient is age 18 or older        Passed - No active pregnancy on record        Passed - No positive pregnancy test in past 12 months        Passed - Recent (6 mo) or future (30 days) visit within the authorizing provider's specialty     Patient had office visit in the last 6 months or has a visit in the next 30 days with authorizing provider or within the authorizing provider's specialty.  See \"Patient Info\" tab in inbasket, or \"Choose Columns\" in Meds & Orders section of the refill encounter.               lamoTRIgine (LAMICTAL) 200 MG tablet [Pharmacy Med Name: LAMOTRIGINE 200MG TABS] 30 tablet 1     Sig: TAKE ONE TABLET BY MOUTH ONCE DAILY       Anti-Seizure Meds Protocol  Failed - 8/1/2020  1:01 AM        Failed - Review Authorizing provider's last note.      Refer to last progress notes: confirm request is for original authorizing provider (cannot be through other providers).          Failed - Normal ALT or AST on file " "in past 26 months     Recent Labs   Lab Test 11/03/16  1218   ALT 15     Recent Labs   Lab Test 11/03/16  1218   AST 6           Passed - Recent (12 mo) or future (30 days) visit within the authorizing provider's specialty     Patient has had an office visit with the authorizing provider or a provider within the authorizing providers department within the previous 12 mos or has a future within next 30 days. See \"Patient Info\" tab in inbasket, or \"Choose Columns\" in Meds & Orders section of the refill encounter.          Passed - Normal CBC on file in past 26 months     Recent Labs   Lab Test 06/18/18  1631   WBC 5.7   RBC 4.23   HGB 13.9   HCT 40.8              Passed - Normal platelet count on file in past 26 months     Recent Labs   Lab Test 06/18/18  1631              Passed - Medication is active on med list        Passed - No active pregnancy on record        Passed - No positive pregnancy test in last 12 months         Routing refill request to provider for review/approval because:  PHQ9 score>5.  MAGUE Jensen                                  "

## 2020-08-05 DIAGNOSIS — F90.0 ATTENTION DEFICIT HYPERACTIVITY DISORDER (ADHD), PREDOMINANTLY INATTENTIVE TYPE: ICD-10-CM

## 2020-08-05 RX ORDER — DEXTROAMPHETAMINE SACCHARATE, AMPHETAMINE ASPARTATE, DEXTROAMPHETAMINE SULFATE AND AMPHETAMINE SULFATE 7.5; 7.5; 7.5; 7.5 MG/1; MG/1; MG/1; MG/1
TABLET ORAL
Qty: 30 TABLET | Refills: 0 | Status: SHIPPED | OUTPATIENT
Start: 2020-08-05 | End: 2020-09-11

## 2020-08-05 NOTE — TELEPHONE ENCOUNTER
Ricardo Saenz MD  [facility] 17 hours ago (4:36 PM)       Needs med check with pcp in sept    Routing comment

## 2020-08-05 NOTE — TELEPHONE ENCOUNTER
Adderall      Last Written Prescription Date:  06/08/2020  Last Fill Quantity: 30,   # refills: 0  Last Office Visit: 06/08/2020    Routing refill request to provider for review/approval because:  Drug not on the [facility], [facility] or [facility] refill protocol or controlled substance    Silva Kirby RN

## 2020-09-11 ENCOUNTER — OFFICE VISIT (OUTPATIENT)
Dept: FAMILY MEDICINE | Facility: CLINIC | Age: 45
End: 2020-09-11
Payer: COMMERCIAL

## 2020-09-11 VITALS
BODY MASS INDEX: 21.27 KG/M2 | HEART RATE: 91 BPM | TEMPERATURE: 98.1 F | SYSTOLIC BLOOD PRESSURE: 118 MMHG | WEIGHT: 130 LBS | DIASTOLIC BLOOD PRESSURE: 62 MMHG | OXYGEN SATURATION: 100 % | RESPIRATION RATE: 16 BRPM

## 2020-09-11 DIAGNOSIS — F41.1 GENERALIZED ANXIETY DISORDER: ICD-10-CM

## 2020-09-11 DIAGNOSIS — Z23 NEED FOR PROPHYLACTIC VACCINATION AND INOCULATION AGAINST INFLUENZA: Primary | ICD-10-CM

## 2020-09-11 DIAGNOSIS — F33.1 MODERATE EPISODE OF RECURRENT MAJOR DEPRESSIVE DISORDER (H): ICD-10-CM

## 2020-09-11 DIAGNOSIS — F90.0 ATTENTION DEFICIT HYPERACTIVITY DISORDER (ADHD), PREDOMINANTLY INATTENTIVE TYPE: ICD-10-CM

## 2020-09-11 DIAGNOSIS — G47.00 INSOMNIA, UNSPECIFIED TYPE: ICD-10-CM

## 2020-09-11 DIAGNOSIS — F60.3 BORDERLINE PERSONALITY DISORDER (H): ICD-10-CM

## 2020-09-11 PROCEDURE — 90471 IMMUNIZATION ADMIN: CPT | Performed by: FAMILY MEDICINE

## 2020-09-11 PROCEDURE — 90686 IIV4 VACC NO PRSV 0.5 ML IM: CPT | Performed by: FAMILY MEDICINE

## 2020-09-11 PROCEDURE — 99213 OFFICE O/P EST LOW 20 MIN: CPT | Mod: 25 | Performed by: FAMILY MEDICINE

## 2020-09-11 RX ORDER — DEXTROAMPHETAMINE SACCHARATE, AMPHETAMINE ASPARTATE, DEXTROAMPHETAMINE SULFATE AND AMPHETAMINE SULFATE 3.75; 3.75; 3.75; 3.75 MG/1; MG/1; MG/1; MG/1
15 TABLET ORAL
Qty: 30 TABLET | Refills: 0 | Status: SHIPPED | OUTPATIENT
Start: 2020-09-11 | End: 2020-10-13

## 2020-09-11 RX ORDER — LAMOTRIGINE 200 MG/1
200 TABLET ORAL DAILY
Qty: 90 TABLET | Refills: 1 | Status: SHIPPED | OUTPATIENT
Start: 2020-09-11 | End: 2021-04-09

## 2020-09-11 RX ORDER — DEXTROAMPHETAMINE SACCHARATE, AMPHETAMINE ASPARTATE, DEXTROAMPHETAMINE SULFATE AND AMPHETAMINE SULFATE 7.5; 7.5; 7.5; 7.5 MG/1; MG/1; MG/1; MG/1
TABLET ORAL
Qty: 30 TABLET | Refills: 0 | Status: SHIPPED | OUTPATIENT
Start: 2020-09-11 | End: 2020-10-13

## 2020-09-11 RX ORDER — QUETIAPINE FUMARATE 25 MG/1
25 TABLET, FILM COATED ORAL AT BEDTIME
Qty: 90 TABLET | Refills: 1 | Status: SHIPPED | OUTPATIENT
Start: 2020-09-11 | End: 2020-11-09

## 2020-09-11 RX ORDER — VENLAFAXINE HYDROCHLORIDE 75 MG/1
CAPSULE, EXTENDED RELEASE ORAL
Qty: 90 CAPSULE | Refills: 1 | Status: SHIPPED | OUTPATIENT
Start: 2020-09-11 | End: 2021-04-09

## 2020-09-11 RX ORDER — VENLAFAXINE HYDROCHLORIDE 150 MG/1
CAPSULE, EXTENDED RELEASE ORAL
Qty: 90 CAPSULE | Refills: 1 | Status: SHIPPED | OUTPATIENT
Start: 2020-09-11 | End: 2021-04-09

## 2020-09-11 ASSESSMENT — PAIN SCALES - GENERAL: PAINLEVEL: NO PAIN (0)

## 2020-09-11 NOTE — PROGRESS NOTES
Subjective     Marjorie Holbrook is a 45 year old female who presents to clinic today for the following health issues:    HPI       Chief Complaint   Patient presents with     Recheck Medication       Marjorie is here today for follow-up on her ADHD, depression/anxiety and insomnia.  She was seeing Dr. Carl, the psychiatrist and was discharged because she has been doing well with the medications.  She was treated for ADHD, major depression, borderline personality disorder and insomnia with Adderall, Lamictal, Seroquel, and Effexor.  Been taking the medication as prescribed with no side effect.  The medication has been effective and are working well for her.  Stated that she is feeling good today, has no concern.  Her anxiety and depression are well controlled.  No suicidal or homicidal ideation.  No hallucination.  ADHD is also well controlled with the current Adderall dose.  She is able to to focus and multitasking.  No heart palpitation or unintended weight loss.  No personal family history of heart disease.  Sleep okay with the Seroquel.  Denied of drugs or alcohol.  No longer seeing a counselor.  Denied of excessive stress.  Her  has been supportive and feels safe at home and at work.  No other concern.    Review of Systems   Constitutional, HEENT, cardiovascular, pulmonary, gi and gu systems are negative, except as otherwise noted.      Objective    /62   Pulse 91   Temp 98.1  F (36.7  C) (Temporal)   Resp 16   Wt 59 kg (130 lb)   LMP 08/21/2013   SpO2 100%   BMI 21.27 kg/m    Body mass index is 21.27 kg/m .  Physical Exam   GENERAL: healthy, alert and no distress.    RESP: lungs clear to auscultation - no rales, rhonchi or wheezes  CV: regular rate and rhythm, normal S1 S2, no S3 or S4, no murmur.  Psy:  Dress appropriately.  Speech was normal and is easily comprehended.  Thought was in tact - no hallucination.        No results found for any visits on 09/11/20.        Assessment & Plan      Attention deficit hyperactivity disorder (ADHD), predominantly inattentive type  Doing better and controlled with Adderall with no side effect from it.  Will continue with the current dose of Adderall and encouraged her to take it only as needed.  No contraindication identified.  No misuse identified.  No drugs or alcohol.  No personal or family history of cardiomyopathy.  Weight is stable and blood pressures are normal.  Follow up in 4 months, earlier as needed.  If keeps doing well  at the next visit, then she may follow-up every 6 months after that.  She feels comfortable with the plan and all of her questions were answered.     - amphetamine-dextroamphetamine (ADDERALL) 15 MG tablet; Take 1 tablet (15 mg) by mouth daily at 2 pm  - amphetamine-dextroamphetamine (ADDERALL) 30 MG tablet; TAKE ONE TABLET BY MOUTH ONCE DAILY    Moderate episode of recurrent major depressive disorder (H)  She also has general anxiety and borderline personality disorder.  Overall, she has been doing really well with the Effexor and Lamictal with no side effect.  She feels her depression and anxiety are well controlled.  No suicidal homicidal ideation.  No hallucination.  No drugs or alcohol.  Will continue with Effexor and Lamictal at the current doses.  Symptoms that need to be seen for call in discussed.  No longer seeing his counselor by choice.  Follow-up in 6 months earlier if any concerns or question.      - venlafaxine (EFFEXOR-XR) 75 MG 24 hr capsule; TAKE ONE CAPSULE BY MOUTH ONCE DAILY  - venlafaxine (EFFEXOR-XR) 150 MG 24 hr capsule; TAKE ONE CAPSULE BY MOUTH ONCE DAILY. TAKE WITH 75MG CAPSULE TO TOTAL 225 DAILY DOSE  - lamoTRIgine (LAMICTAL) 200 MG tablet; Take 1 tablet (200 mg) by mouth daily    Generalized anxiety disorder  Please see #2 above for further details.  - venlafaxine (EFFEXOR-XR) 75 MG 24 hr capsule; TAKE ONE CAPSULE BY MOUTH ONCE DAILY  - venlafaxine (EFFEXOR-XR) 150 MG 24 hr capsule; TAKE ONE CAPSULE BY  MOUTH ONCE DAILY. TAKE WITH 75MG CAPSULE TO TOTAL 225 DAILY DOSE    Insomnia, unspecified type  Discussed with her about the nature of the condition.  Good sleeping hygiene also discussed.  Emphasized on healthy diet, daily exercise with adequate resting/water intake.  Encouraged to avoid high caffeine/sugar intake, especially after 3 PM.  Continue with the Seroquel as needed.    - QUEtiapine (SEROQUEL) 25 MG tablet; Take 1 tablet (25 mg) by mouth At Bedtime Take 12.5-25 mg by mouth during day as needed for anxiety and take 25-50 mg at bedtime for sleep as needed.    Borderline personality disorder (H)  Please see #1 and 2 above for further details.    Need for prophylactic vaccination and inoculation against influenza  No contraindication indicated.  Side effect discussed and otc med for symptomatic treatment.     - INFLUENZA VACCINE IM > 6 MONTHS VALENT IIV4 [79048]  - Vaccine Administration, Initial [47967]     Tobacco Cessation:   reports that she has been smoking cigarettes. She started smoking about 12 years ago. She has a 4.50 pack-year smoking history. She has never used smokeless tobacco.  Tobacco Cessation Action Plan: Information offered: Patient not interested at this time        Return in about 4 months (around 1/11/2021) for Physical Exam, folow up ADHD.    Ovidio Jenkins Mai, MD  [facility]

## 2020-10-09 DIAGNOSIS — F41.1 GENERALIZED ANXIETY DISORDER: ICD-10-CM

## 2020-10-09 DIAGNOSIS — F33.1 MODERATE EPISODE OF RECURRENT MAJOR DEPRESSIVE DISORDER (H): ICD-10-CM

## 2020-10-09 RX ORDER — VENLAFAXINE HYDROCHLORIDE 150 MG/1
CAPSULE, EXTENDED RELEASE ORAL
Qty: 30 CAPSULE | Refills: 1 | OUTPATIENT
Start: 2020-10-09

## 2020-10-09 RX ORDER — LAMOTRIGINE 200 MG/1
TABLET ORAL
Qty: 30 TABLET | Refills: 1 | OUTPATIENT
Start: 2020-10-09

## 2020-10-09 RX ORDER — VENLAFAXINE HYDROCHLORIDE 75 MG/1
CAPSULE, EXTENDED RELEASE ORAL
Qty: 30 CAPSULE | Refills: 1 | OUTPATIENT
Start: 2020-10-09

## 2020-10-09 NOTE — TELEPHONE ENCOUNTER
Prescription was sent 9/11/20 for #90 with 1 refills.  Pharmacy notified via E-Prescribe refusal.     Jennifer Jessica RN on 10/9/2020 at 9:47 AM

## 2020-10-13 DIAGNOSIS — F90.0 ATTENTION DEFICIT HYPERACTIVITY DISORDER (ADHD), PREDOMINANTLY INATTENTIVE TYPE: ICD-10-CM

## 2020-10-13 RX ORDER — DEXTROAMPHETAMINE SACCHARATE, AMPHETAMINE ASPARTATE, DEXTROAMPHETAMINE SULFATE AND AMPHETAMINE SULFATE 7.5; 7.5; 7.5; 7.5 MG/1; MG/1; MG/1; MG/1
TABLET ORAL
Qty: 30 TABLET | Refills: 0 | Status: SHIPPED | OUTPATIENT
Start: 2020-10-13 | End: 2020-11-22

## 2020-10-13 RX ORDER — DEXTROAMPHETAMINE SACCHARATE, AMPHETAMINE ASPARTATE, DEXTROAMPHETAMINE SULFATE AND AMPHETAMINE SULFATE 3.75; 3.75; 3.75; 3.75 MG/1; MG/1; MG/1; MG/1
TABLET ORAL
Qty: 30 TABLET | Refills: 0 | Status: SHIPPED | OUTPATIENT
Start: 2020-10-13 | End: 2020-11-22

## 2020-10-13 NOTE — TELEPHONE ENCOUNTER
Requested Prescriptions   Pending Prescriptions Disp Refills     amphetamine-dextroamphetamine (ADDERALL) 15 MG tablet [Pharmacy Med Name: AMPHETAMINE-DEXTROAMPHETAMI 15 TABS] 30 tablet 0     Sig: TAKE ONE TABLET (15 MG) BY MOUTH DAILY AT 2 PM.     Last Written Prescription Date:  09/11/2020  Last Fill Quantity: 30,   # refills: 0  Last Office Visit: 09/11/2020  Future Office visit:    Next 5 appointments (look out 90 days)    Dec 04, 2020  2:20 PM  PHYSICAL with Ovidio Jenkins Mai, MD  [facility] ([address] [address]  [phone]           Routing refill request to provider for review/approval because:  Drug not on the [facility] or  [facility] refill protocol or controlled substance              amphetamine-dextroamphetamine (ADDERALL) 30 MG tablet [Pharmacy Med Name: AMPHETAMINE-DEXTROAMPHETAMI 30 TABS] 30 tablet 0     Sig: TAKE ONE TABLET BY MOUTH ONCE DAILY.     Last Written Prescription Date:  09/11/2020  Last Fill Quantity: 30,   # refills: 0  Last Office Visit: 09/11/2020  Future Office visit:    Next 5 appointments (look out 90 days)    Dec 04, 2020  2:20 PM  PHYSICAL with Ovidio Jenkins Mai, MD  [facility] ([address] [id number] [phone]           Routing refill request to provider for review/approval because:  Drug not on the [facility] refill protocol or controlled substance    Ariana Leblanc MA

## 2020-10-28 ENCOUNTER — MYC MEDICAL ADVICE (OUTPATIENT)
Dept: FAMILY MEDICINE | Facility: CLINIC | Age: 45
End: 2020-10-28

## 2020-11-09 DIAGNOSIS — G47.00 INSOMNIA, UNSPECIFIED TYPE: ICD-10-CM

## 2020-11-09 RX ORDER — QUETIAPINE FUMARATE 25 MG/1
TABLET, FILM COATED ORAL
Qty: 90 TABLET | Refills: 1 | Status: SHIPPED | OUTPATIENT
Start: 2020-11-09 | End: 2021-12-10

## 2020-11-09 NOTE — TELEPHONE ENCOUNTER
Routing refill request to provider for review/approval because:  Labs not current:  CBC    Silva Kirby RN

## 2020-12-04 ENCOUNTER — OFFICE VISIT (OUTPATIENT)
Dept: FAMILY MEDICINE | Facility: CLINIC | Age: 45
End: 2020-12-04
Payer: COMMERCIAL

## 2020-12-04 VITALS
SYSTOLIC BLOOD PRESSURE: 96 MMHG | TEMPERATURE: 98.2 F | HEIGHT: 64 IN | WEIGHT: 128.8 LBS | DIASTOLIC BLOOD PRESSURE: 68 MMHG | OXYGEN SATURATION: 98 % | BODY MASS INDEX: 21.99 KG/M2 | RESPIRATION RATE: 16 BRPM | HEART RATE: 86 BPM

## 2020-12-04 DIAGNOSIS — F17.200 TOBACCO USE DISORDER: ICD-10-CM

## 2020-12-04 DIAGNOSIS — R53.83 FATIGUE, UNSPECIFIED TYPE: ICD-10-CM

## 2020-12-04 DIAGNOSIS — Z00.00 ROUTINE GENERAL MEDICAL EXAMINATION AT A HEALTH CARE FACILITY: Primary | ICD-10-CM

## 2020-12-04 DIAGNOSIS — R87.610 ATYPICAL SQUAMOUS CELLS OF UNDETERMINED SIGNIFICANCE ON CYTOLOGIC SMEAR OF CERVIX (ASC-US): ICD-10-CM

## 2020-12-04 DIAGNOSIS — Z90.710 H/O HYSTERECTOMY FOR BENIGN DISEASE: ICD-10-CM

## 2020-12-04 LAB
ALBUMIN SERPL-MCNC: 4.2 G/DL (ref 3.4–5)
ALP SERPL-CCNC: 91 U/L (ref 40–150)
ALT SERPL W P-5'-P-CCNC: 16 U/L (ref 0–50)
ANION GAP SERPL CALCULATED.3IONS-SCNC: 4 MMOL/L (ref 3–14)
AST SERPL W P-5'-P-CCNC: 7 U/L (ref 0–45)
BASOPHILS # BLD AUTO: 0 10E9/L (ref 0–0.2)
BASOPHILS NFR BLD AUTO: 0.9 %
BILIRUB SERPL-MCNC: 0.3 MG/DL (ref 0.2–1.3)
BUN SERPL-MCNC: 12 MG/DL (ref 7–30)
CALCIUM SERPL-MCNC: 8.7 MG/DL (ref 8.5–10.1)
CHLORIDE SERPL-SCNC: 110 MMOL/L (ref 94–109)
CHOLEST SERPL-MCNC: 161 MG/DL
CO2 SERPL-SCNC: 29 MMOL/L (ref 20–32)
CREAT SERPL-MCNC: 0.76 MG/DL (ref 0.52–1.04)
DIFFERENTIAL METHOD BLD: ABNORMAL
EOSINOPHIL NFR BLD AUTO: 10.8 %
ERYTHROCYTE [DISTWIDTH] IN BLOOD BY AUTOMATED COUNT: 11.6 % (ref 10–15)
FERRITIN SERPL-MCNC: 118 NG/ML (ref 8–252)
GFR SERPL CREATININE-BSD FRML MDRD: >90 ML/MIN/{1.73_M2}
GLUCOSE SERPL-MCNC: 90 MG/DL (ref 70–99)
HCT VFR BLD AUTO: 35.9 % (ref 35–47)
HDLC SERPL-MCNC: 80 MG/DL
HGB BLD-MCNC: 12.2 G/DL (ref 11.7–15.7)
IMM GRANULOCYTES # BLD: 0 10E9/L (ref 0–0.4)
IMM GRANULOCYTES NFR BLD: 0.2 %
LDLC SERPL CALC-MCNC: 69 MG/DL
LYMPHOCYTES # BLD AUTO: 1.5 10E9/L (ref 0.8–5.3)
LYMPHOCYTES NFR BLD AUTO: 33.9 %
MCH RBC QN AUTO: 33 PG (ref 26.5–33)
MCHC RBC AUTO-ENTMCNC: 34 G/DL (ref 31.5–36.5)
MCV RBC AUTO: 97 FL (ref 78–100)
MONOCYTES # BLD AUTO: 0.4 10E9/L (ref 0–1.3)
MONOCYTES NFR BLD AUTO: 9.9 %
NEUTROPHILS # BLD AUTO: 2 10E9/L (ref 1.6–8.3)
NEUTROPHILS NFR BLD AUTO: 44.3 %
NONHDLC SERPL-MCNC: 81 MG/DL
NRBC # BLD AUTO: 0 10*3/UL
NRBC BLD AUTO-RTO: 0 /100
PLATELET # BLD AUTO: 239 10E9/L (ref 150–450)
POTASSIUM SERPL-SCNC: 3.7 MMOL/L (ref 3.4–5.3)
PROT SERPL-MCNC: 7.1 G/DL (ref 6.8–8.8)
RBC # BLD AUTO: 3.7 10E12/L (ref 3.8–5.2)
SODIUM SERPL-SCNC: 143 MMOL/L (ref 133–144)
TRIGL SERPL-MCNC: 59 MG/DL
TSH SERPL DL<=0.005 MIU/L-ACNC: 0.71 MU/L (ref 0.4–4)
WBC # BLD AUTO: 4.4 10E9/L (ref 4–11)

## 2020-12-04 PROCEDURE — 80061 LIPID PANEL: CPT | Performed by: FAMILY MEDICINE

## 2020-12-04 PROCEDURE — 99396 PREV VISIT EST AGE 40-64: CPT | Performed by: FAMILY MEDICINE

## 2020-12-04 PROCEDURE — 36415 COLL VENOUS BLD VENIPUNCTURE: CPT | Performed by: FAMILY MEDICINE

## 2020-12-04 PROCEDURE — 82728 ASSAY OF FERRITIN: CPT | Performed by: FAMILY MEDICINE

## 2020-12-04 PROCEDURE — 99213 OFFICE O/P EST LOW 20 MIN: CPT | Mod: 25 | Performed by: FAMILY MEDICINE

## 2020-12-04 PROCEDURE — 82306 VITAMIN D 25 HYDROXY: CPT | Performed by: FAMILY MEDICINE

## 2020-12-04 PROCEDURE — 80050 GENERAL HEALTH PANEL: CPT | Performed by: FAMILY MEDICINE

## 2020-12-04 PROCEDURE — 86803 HEPATITIS C AB TEST: CPT | Performed by: FAMILY MEDICINE

## 2020-12-04 ASSESSMENT — ENCOUNTER SYMPTOMS
NERVOUS/ANXIOUS: 1
BREAST MASS: 0
MYALGIAS: 1

## 2020-12-04 ASSESSMENT — MIFFLIN-ST. JEOR: SCORE: 1202.64
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
## 2020-12-04 NOTE — PATIENT INSTRUCTIONS
Preventive Health Recommendations  Female Ages 40 to 49    Yearly exam:     See your health care provider every year in order to  1. Review health changes.   2. Discuss preventive care.    3. Review your medicines if your doctor prescribed any.      Get a Pap test every three years (unless you have an abnormal result and your provider advises testing more often).      If you get Pap tests with HPV test, you only need to test every 5 years, unless you have an abnormal result. You do not need a Pap test if your uterus was removed (hysterectomy) and you have not had cancer.      You should be tested each year for STDs (sexually transmitted diseases), if you're at risk.     Ask your doctor if you should have a mammogram.      Have a colonoscopy (test for colon cancer) if someone in your family has had colon cancer or polyps before age 50.       Have a cholesterol test every 5 years.       Have a diabetes test (fasting glucose) after age 45. If you are at risk for diabetes, you should have this test every 3 years.    Shots: Get a flu shot each year. Get a tetanus shot every 10 years.     Nutrition:     Eat at least 5 servings of fruits and vegetables each day.    Eat whole-grain bread, whole-wheat pasta and brown rice instead of white grains and rice.    Get adequate Calcium and Vitamin D.      Lifestyle    Exercise at least 150 minutes a week (an average of 30 minutes a day, 5 days a week). This will help you control your weight and prevent disease.    Limit alcohol to one drink per day.    No smoking.     Wear sunscreen to prevent skin cancer.    See your dentist every six months for an exam and cleaning.  HOW TO QUIT SMOKING  Smoking is one of the hardest habits to break. About half of all those who have ever smoked have been able to quit, and most of those (about 70%) who still smoke want to quit. Here are some of the best ways to stop smoking.     KEEP TRYING:  It takes most smokers about 8 tries before they are  finally able to fully quit. So, the more often you try and fail, the better your chance of quitting the next time! So, don't give up!    GO COLD TURKEY:  Most ex-smokers quit cold turkey. Trying to cut back gradually doesn't seem to work as well, perhaps because it continues the smoking habit. Also, it is possible to fool yourself by inhaling more while smoking fewer cigarettes. This results in the same amount of nicotine in your body!    GET SUPPORT:  Support programs can make an important difference, especially for the heavy smoker. These groups offer lectures, methods to change your behavior and peer support. Call the free national Quitline for more information. 800-QUIT-NOW (563-804-4177). Low-cost or free programs are offered by many hospitals, local chapters of the American Lung Association (483-370-8281) and the American Cancer Society (425-317-5041). Support at home is important too. Non-smokers can help by offering praise and encouragement. If the smoker fails to quit, encourage them to try again!    OVER-THE-COUNTER MEDICINES:  For those who can't quit on their own, Nicotine Replacement Therapy (NRT) may make quitting much easier. Certain aids such as the nicotine patch, gum and lozenge are available without a prescription. However, it is best to use these under the guidance of your doctor. The skin patch provides a steady supply of nicotine to the body. Nicotine gum and lozenge gives temporary bursts of low levels of nicotine. Both methods take the edge off the craving for cigarettes. WARNING: If you feel symptoms of nicotine overdose, such as nausea, vomiting, dizziness, weakness, or fast heartbeat, stop using these and see your doctor.    PRESCRIPTION MEDICINES:  After evaluating your smoking patterns and prior attempts at quitting, your doctor may offer a prescription medicine such as bupropion (Zyban, Wellbutrin), varenicline (Chantix, Champix), a niocotine inhaler or nasal spray. Each has its unique  advantage and side effects which your doctor can review with you.    HEALTH BENEFITS OF QUITTING:  The benefits of quitting start right away and keep improving the longer you go without smokin minutes: blood pressure and pulse return to normal  8 hours: oxygen levels return to normal  2 days: ability to smell and taste begins to improve as damaged nerves start to regrow  2-3 weeks: circulation and lung function improves  1-9 months: decreased cough, congestion and shortness of breath; less tired  1 year: risk of heart attack decreases by half  5 years: risk of lung cancer decreases by half; risk of stroke becomes the same as a non-smoker  For information about how to quit smoking, visit the following links:  National [employer] ,   Clearing the Air, Quit Smoking Today   - an online booklet. http://www.smokefree.gov/pubs/clearing_the_air.pdf  Smokefree.gov http://smokefree.gov/  QuitNet http://www.quitnet.com/    7596-5655 [facility], [address] [place] [address], [place] [address]. All rights reserved. This information is not intended as a substitute for professional medical care. Always follow your healthcare professional's instructions.

## 2020-12-04 NOTE — Clinical Note
Please let patient know that I reviewed her medical record again and noted that she had an abnormal pap in 2014 but never had a follow up pap.  I know she had a hysterectomy in 2015 and it was normal.  I recommended her to come in for pap and HR HPV.  If both are normal then she will not need any more pap in the future.  thanks

## 2020-12-07 PROBLEM — R87.610 ATYPICAL SQUAMOUS CELLS OF UNDETERMINED SIGNIFICANCE ON CYTOLOGIC SMEAR OF CERVIX (ASC-US): Status: ACTIVE | Noted: 2020-12-07

## 2020-12-07 LAB
DEPRECATED CALCIDIOL+CALCIFEROL SERPL-MC: 15 UG/L (ref 20–75)
HCV AB SERPL QL IA: NONREACTIVE

## 2020-12-08 ENCOUNTER — TELEPHONE (OUTPATIENT)
Dept: FAMILY MEDICINE | Facility: CLINIC | Age: 45
End: 2020-12-08

## 2020-12-08 NOTE — TELEPHONE ENCOUNTER
I called pt and left a msg to call back  Ovidio Osman MA, Mai, MD  [facility]             Please let patient know that I reviewed her medical record again and noted that she had an abnormal pap in 2014 but never had a follow up pap.  I know she had a hysterectomy in 2015 and it was normal.  I recommended her to come in for pap and HR HPV.  If both are normal then she will not need any more pap in the future.  thanks

## 2021-01-02 DIAGNOSIS — F17.200 TOBACCO USE DISORDER: Primary | ICD-10-CM

## 2021-01-04 RX ORDER — VARENICLINE TARTRATE 1 MG/1
1 TABLET, FILM COATED ORAL 2 TIMES DAILY
Qty: 60 TABLET | Refills: 2 | Status: SHIPPED | OUTPATIENT
Start: 2021-01-04 | End: 2021-12-17

## 2021-01-10 ENCOUNTER — MYC REFILL (OUTPATIENT)
Dept: FAMILY MEDICINE | Facility: CLINIC | Age: 46
End: 2021-01-10

## 2021-01-10 DIAGNOSIS — F90.0 ATTENTION DEFICIT HYPERACTIVITY DISORDER (ADHD), PREDOMINANTLY INATTENTIVE TYPE: ICD-10-CM

## 2021-01-11 RX ORDER — DEXTROAMPHETAMINE SACCHARATE, AMPHETAMINE ASPARTATE, DEXTROAMPHETAMINE SULFATE AND AMPHETAMINE SULFATE 3.75; 3.75; 3.75; 3.75 MG/1; MG/1; MG/1; MG/1
15 TABLET ORAL DAILY
Qty: 30 TABLET | Refills: 0 | Status: SHIPPED | OUTPATIENT
Start: 2021-01-11 | End: 2021-02-23

## 2021-01-11 RX ORDER — DEXTROAMPHETAMINE SACCHARATE, AMPHETAMINE ASPARTATE, DEXTROAMPHETAMINE SULFATE AND AMPHETAMINE SULFATE 7.5; 7.5; 7.5; 7.5 MG/1; MG/1; MG/1; MG/1
30 TABLET ORAL DAILY
Qty: 30 TABLET | Refills: 0 | Status: SHIPPED | OUTPATIENT
Start: 2021-01-11 | End: 2021-02-23

## 2021-01-11 NOTE — TELEPHONE ENCOUNTER
amphetamine-dextroamphetamine (ADDERALL) 15 MG tablet       Last Written Prescription Date:  11/24/2020  Last Fill Quantity: 30,   # refills: 0  Last Office Visit: 12/4/20  Future Office visit:       Routing refill request to provider for review/approval because:  Drug not on the [facility], [facility] or [facility] refill protocol or controlled substance

## 2021-01-11 NOTE — TELEPHONE ENCOUNTER
amphetamine-dextroamphetamine (ADDERALL) 30 MG tablet       Last Written Prescription Date:  11/24/20  Last Fill Quantity: 30,   # refills: 0  Last Office Visit: 12/4/20  Future Office visit:       Routing refill request to provider for review/approval because:  Drug not on the [facility], [facility] or [facility] refill protocol or controlled substance

## 2021-02-21 ENCOUNTER — HEALTH MAINTENANCE LETTER (OUTPATIENT)
Age: 46
End: 2021-02-21

## 2021-04-23 DIAGNOSIS — F90.0 ATTENTION DEFICIT HYPERACTIVITY DISORDER (ADHD), PREDOMINANTLY INATTENTIVE TYPE: ICD-10-CM

## 2021-04-23 RX ORDER — DEXTROAMPHETAMINE SACCHARATE, AMPHETAMINE ASPARTATE, DEXTROAMPHETAMINE SULFATE AND AMPHETAMINE SULFATE 3.75; 3.75; 3.75; 3.75 MG/1; MG/1; MG/1; MG/1
TABLET ORAL
Qty: 30 TABLET | Refills: 0 | Status: SHIPPED | OUTPATIENT
Start: 2021-04-23 | End: 2021-06-08

## 2021-04-23 RX ORDER — DEXTROAMPHETAMINE SACCHARATE, AMPHETAMINE ASPARTATE, DEXTROAMPHETAMINE SULFATE AND AMPHETAMINE SULFATE 7.5; 7.5; 7.5; 7.5 MG/1; MG/1; MG/1; MG/1
TABLET ORAL
Qty: 30 TABLET | Refills: 0 | Status: SHIPPED | OUTPATIENT
Start: 2021-04-23 | End: 2021-06-08

## 2021-04-23 NOTE — TELEPHONE ENCOUNTER
Adderall 15 mg      Last Written Prescription Date:  2/23/2021  Last Fill Quantity: 30,   # refills: 0  Last Office Visit: 12/4/2020  Future Office visit:       Routing refill request to provider for review/approval because:  Drug not on the [facility], [facility] or [facility] refill protocol or controlled substance    Adderall 30 mg      Last Written Prescription Date:  2/23/2021  Last Fill Quantity: 30,   # refills: 0  Last Office Visit: 12/4/2021  Future Office visit:       Routing refill request to provider for review/approval because:  Drug not on the [facility], [facility] or [facility] refill protocol or controlled substance

## 2021-06-07 DIAGNOSIS — F90.0 ATTENTION DEFICIT HYPERACTIVITY DISORDER (ADHD), PREDOMINANTLY INATTENTIVE TYPE: ICD-10-CM

## 2021-06-08 RX ORDER — DEXTROAMPHETAMINE SACCHARATE, AMPHETAMINE ASPARTATE, DEXTROAMPHETAMINE SULFATE AND AMPHETAMINE SULFATE 7.5; 7.5; 7.5; 7.5 MG/1; MG/1; MG/1; MG/1
TABLET ORAL
Qty: 30 TABLET | Refills: 0 | Status: SHIPPED | OUTPATIENT
Start: 2021-06-08 | End: 2021-08-03

## 2021-06-08 RX ORDER — DEXTROAMPHETAMINE SACCHARATE, AMPHETAMINE ASPARTATE, DEXTROAMPHETAMINE SULFATE AND AMPHETAMINE SULFATE 3.75; 3.75; 3.75; 3.75 MG/1; MG/1; MG/1; MG/1
TABLET ORAL
Qty: 30 TABLET | Refills: 0 | Status: SHIPPED | OUTPATIENT
Start: 2021-06-08 | End: 2021-08-03

## 2021-06-08 NOTE — TELEPHONE ENCOUNTER
Requested Prescriptions   Pending Prescriptions Disp Refills     amphetamine-dextroamphetamine (ADDERALL) 30 MG tablet [Pharmacy Med Name: AMPHETAMINE-DEXTROAMPHETAMI 30 TABS] 30 tablet 0     Sig: TAKE ONE TABLET BY MOUTH ONCE DAILY     Last Written Prescription Date:  04/23/2021  Last Fill Quantity: 30,   # refills: 0  Last Office Visit: 12/04/2020  Future Office visit:       Routing refill request to provider for review/approval because:  Drug not on the [facility] [place], P or  Health refill protocol or controlled substance              amphetamine-dextroamphetamine (ADDERALL) 15 MG tablet [Pharmacy Med Name: AMPHETAMINE-DEXTROAMPHETAMI 15 TABS] 30 tablet 0     Sig: TAKE ONE TABLET BY MOUTH ONCE DAILY     Last Written Prescription Date:  04/23/2021  Last Fill Quantity: 30,   # refills: 0  Last Office Visit: 12/04/2020  Future Office visit:       Routing refill request to provider for review/approval because:  Drug not on the [facility] [place], P or  Health refill protocol or controlled substance

## 2021-08-03 DIAGNOSIS — F90.0 ATTENTION DEFICIT HYPERACTIVITY DISORDER (ADHD), PREDOMINANTLY INATTENTIVE TYPE: ICD-10-CM

## 2021-08-03 RX ORDER — DEXTROAMPHETAMINE SACCHARATE, AMPHETAMINE ASPARTATE, DEXTROAMPHETAMINE SULFATE AND AMPHETAMINE SULFATE 7.5; 7.5; 7.5; 7.5 MG/1; MG/1; MG/1; MG/1
TABLET ORAL
Qty: 30 TABLET | Refills: 0 | Status: SHIPPED | OUTPATIENT
Start: 2021-08-03 | End: 2021-09-15

## 2021-08-03 RX ORDER — DEXTROAMPHETAMINE SACCHARATE, AMPHETAMINE ASPARTATE, DEXTROAMPHETAMINE SULFATE AND AMPHETAMINE SULFATE 3.75; 3.75; 3.75; 3.75 MG/1; MG/1; MG/1; MG/1
TABLET ORAL
Qty: 30 TABLET | Refills: 0 | Status: SHIPPED | OUTPATIENT
Start: 2021-08-03 | End: 2021-09-15

## 2021-08-03 NOTE — CONFIDENTIAL NOTE
Requested Prescriptions   Pending Prescriptions Disp Refills     amphetamine-dextroamphetamine (ADDERALL) 15 MG tablet [Pharmacy Med Name: AMPHETAMINE-DEXTROAMPHETAMI 15 TABS] 30 tablet 0     Sig: TAKE ONE TABLET BY MOUTH ONCE DAILY      Last Written Prescription Date:  06/08/2021  Last Fill Quantity: 30,   # refills: 0  Last Office Visit: 12/04/2020  Future Office visit:       Routing refill request to provider for review/approval because:  Drug not on the [facility], P or  Health refill protocol or controlled substance              amphetamine-dextroamphetamine (ADDERALL) 30 MG tablet [Pharmacy Med Name: AMPHETAMINE-DEXTROAMPHETAMI 30 TABS] 30 tablet 0     Sig: TAKE ONE TABLET BY MOUTH ONCE DAILY     Last Written Prescription Date:  06/08/2021  Last Fill Quantity: 30,   # refills: 0  Last Office Visit: 12/04/2020  Future Office visit:       Routing refill request to provider for review/approval because:  Drug not on the [facility], P or  Health refill protocol or controlled substance

## 2021-09-13 DIAGNOSIS — F90.0 ATTENTION DEFICIT HYPERACTIVITY DISORDER (ADHD), PREDOMINANTLY INATTENTIVE TYPE: ICD-10-CM

## 2021-09-14 NOTE — TELEPHONE ENCOUNTER
Adderall 30 mg      Last Written Prescription Date:  8/3/2021  Last Fill Quantity: 30,   # refills: 0  Last Office Visit: 12/4/2020  Future Office visit:       Routing refill request to provider for review/approval because:  Drug not on the [facility], [facility] or [facility] refill protocol or controlled substance    Adderall 15 mg      Last Written Prescription Date:  8/3/2021  Last Fill Quantity: 30,   # refills: 0  Last Office Visit: 12/4/2020  Future Office visit:       Routing refill request to provider for review/approval because:  Drug not on the [facility], [facility] or [facility] refill protocol or controlled substance

## 2021-09-15 RX ORDER — DEXTROAMPHETAMINE SACCHARATE, AMPHETAMINE ASPARTATE, DEXTROAMPHETAMINE SULFATE AND AMPHETAMINE SULFATE 3.75; 3.75; 3.75; 3.75 MG/1; MG/1; MG/1; MG/1
TABLET ORAL
Qty: 30 TABLET | Refills: 0 | Status: SHIPPED | OUTPATIENT
Start: 2021-09-15 | End: 2021-11-16

## 2021-09-15 RX ORDER — DEXTROAMPHETAMINE SACCHARATE, AMPHETAMINE ASPARTATE, DEXTROAMPHETAMINE SULFATE AND AMPHETAMINE SULFATE 7.5; 7.5; 7.5; 7.5 MG/1; MG/1; MG/1; MG/1
TABLET ORAL
Qty: 30 TABLET | Refills: 0 | Status: SHIPPED | OUTPATIENT
Start: 2021-09-15 | End: 2021-11-16

## 2021-09-26 ENCOUNTER — HEALTH MAINTENANCE LETTER (OUTPATIENT)
Age: 46
End: 2021-09-26

## 2021-10-27 DIAGNOSIS — F41.1 GENERALIZED ANXIETY DISORDER: ICD-10-CM

## 2021-10-27 DIAGNOSIS — F33.1 MODERATE EPISODE OF RECURRENT MAJOR DEPRESSIVE DISORDER (H): ICD-10-CM

## 2021-10-28 NOTE — TELEPHONE ENCOUNTER
Effexor  Routing refill request to provider for review/approval because:  Patient needs to be seen because it has been more than 1 year since last office visit.  No current PHQ 9    Lamotrigine  Routing refill request to provider for review/approval because:  Labs not current:  ROMARIO Kirby RN

## 2021-11-03 RX ORDER — VENLAFAXINE HYDROCHLORIDE 150 MG/1
CAPSULE, EXTENDED RELEASE ORAL
Qty: 30 CAPSULE | Refills: 0 | Status: SHIPPED | OUTPATIENT
Start: 2021-11-03 | End: 2021-12-10

## 2021-11-03 RX ORDER — VENLAFAXINE HYDROCHLORIDE 75 MG/1
CAPSULE, EXTENDED RELEASE ORAL
Qty: 30 CAPSULE | Refills: 0 | Status: SHIPPED | OUTPATIENT
Start: 2021-11-03 | End: 2021-12-10

## 2021-11-03 RX ORDER — LAMOTRIGINE 200 MG/1
TABLET ORAL
Qty: 30 TABLET | Refills: 0 | Status: SHIPPED | OUTPATIENT
Start: 2021-11-03 | End: 2021-12-10

## 2021-11-03 NOTE — TELEPHONE ENCOUNTER
One month supply refilled, must follow up before med runs out - med follow up and physical.  Due for cervical cancer screening as well.  thanks

## 2021-11-04 ENCOUNTER — MYC MEDICAL ADVICE (OUTPATIENT)
Dept: FAMILY MEDICINE | Facility: CLINIC | Age: 46
End: 2021-11-04
Payer: COMMERCIAL

## 2021-11-16 ENCOUNTER — MYC REFILL (OUTPATIENT)
Dept: FAMILY MEDICINE | Facility: CLINIC | Age: 46
End: 2021-11-16
Payer: COMMERCIAL

## 2021-11-16 DIAGNOSIS — F90.0 ATTENTION DEFICIT HYPERACTIVITY DISORDER (ADHD), PREDOMINANTLY INATTENTIVE TYPE: ICD-10-CM

## 2021-11-16 RX ORDER — DEXTROAMPHETAMINE SACCHARATE, AMPHETAMINE ASPARTATE, DEXTROAMPHETAMINE SULFATE AND AMPHETAMINE SULFATE 7.5; 7.5; 7.5; 7.5 MG/1; MG/1; MG/1; MG/1
30 TABLET ORAL DAILY
Qty: 30 TABLET | Refills: 0 | Status: SHIPPED | OUTPATIENT
Start: 2021-11-16 | End: 2021-12-17

## 2021-11-16 RX ORDER — DEXTROAMPHETAMINE SACCHARATE, AMPHETAMINE ASPARTATE, DEXTROAMPHETAMINE SULFATE AND AMPHETAMINE SULFATE 3.75; 3.75; 3.75; 3.75 MG/1; MG/1; MG/1; MG/1
15 TABLET ORAL DAILY
Qty: 30 TABLET | Refills: 0 | Status: SHIPPED | OUTPATIENT
Start: 2021-11-16 | End: 2021-12-17

## 2021-11-16 NOTE — TELEPHONE ENCOUNTER
Adderall 30mg      Last Written Prescription Date:  9/15/21  Last Fill Quantity: 30,   # refills: 0  Last Office Visit: 12/4/20  Future Office visit:  None     Routing refill request to provider for review/approval because:  Drug not on the [facility], [facility] or [facility] refill protocol or controlled substance    Adderall 15mg     Last Written Prescription Date:  9/15/21  Last Fill Quantity: 30,   # refills: 0  Last Office Visit: 12/4/20  Future Office visit: none    Routing refill request to provider for review/approval because:  Drug not on the [facility], [facility] or [facility] refill protocol or controlled substance      Aiden MontanoRN

## 2021-12-10 DIAGNOSIS — F33.1 MODERATE EPISODE OF RECURRENT MAJOR DEPRESSIVE DISORDER (H): ICD-10-CM

## 2021-12-10 DIAGNOSIS — F41.1 GENERALIZED ANXIETY DISORDER: ICD-10-CM

## 2021-12-10 RX ORDER — VENLAFAXINE HYDROCHLORIDE 75 MG/1
CAPSULE, EXTENDED RELEASE ORAL
Qty: 30 CAPSULE | Refills: 0 | Status: SHIPPED | OUTPATIENT
Start: 2021-12-10 | End: 2021-12-17

## 2021-12-10 RX ORDER — VENLAFAXINE HYDROCHLORIDE 150 MG/1
CAPSULE, EXTENDED RELEASE ORAL
Qty: 30 CAPSULE | Refills: 0 | Status: SHIPPED | OUTPATIENT
Start: 2021-12-10 | End: 2021-12-17

## 2021-12-10 RX ORDER — LAMOTRIGINE 200 MG/1
TABLET ORAL
Qty: 30 TABLET | Refills: 0 | Status: SHIPPED | OUTPATIENT
Start: 2021-12-10 | End: 2021-12-17

## 2021-12-10 NOTE — TELEPHONE ENCOUNTER
Effexor  Routing refill request to provider for review/approval because:  Labs not current:  [facility]  Needs PHQ-9, BP check    Lamotrogine  Routing refill request to provider for review/approval because:  Labs not current:  CBC    Aiden Montano RN

## 2021-12-17 ENCOUNTER — OFFICE VISIT (OUTPATIENT)
Dept: FAMILY MEDICINE | Facility: CLINIC | Age: 46
End: 2021-12-17
Payer: COMMERCIAL

## 2021-12-17 VITALS
HEIGHT: 65 IN | TEMPERATURE: 98.2 F | DIASTOLIC BLOOD PRESSURE: 70 MMHG | BODY MASS INDEX: 23.03 KG/M2 | SYSTOLIC BLOOD PRESSURE: 118 MMHG | OXYGEN SATURATION: 100 % | HEART RATE: 96 BPM | RESPIRATION RATE: 18 BRPM | WEIGHT: 138.25 LBS

## 2021-12-17 DIAGNOSIS — G47.00 INSOMNIA, UNSPECIFIED TYPE: ICD-10-CM

## 2021-12-17 DIAGNOSIS — F90.0 ATTENTION DEFICIT HYPERACTIVITY DISORDER (ADHD), PREDOMINANTLY INATTENTIVE TYPE: Primary | ICD-10-CM

## 2021-12-17 DIAGNOSIS — Z12.31 ENCOUNTER FOR SCREENING MAMMOGRAM FOR BREAST CANCER: ICD-10-CM

## 2021-12-17 DIAGNOSIS — F41.1 GENERALIZED ANXIETY DISORDER: ICD-10-CM

## 2021-12-17 DIAGNOSIS — F33.1 MODERATE EPISODE OF RECURRENT MAJOR DEPRESSIVE DISORDER (H): ICD-10-CM

## 2021-12-17 PROCEDURE — 99214 OFFICE O/P EST MOD 30 MIN: CPT | Performed by: FAMILY MEDICINE

## 2021-12-17 RX ORDER — DEXTROAMPHETAMINE SACCHARATE, AMPHETAMINE ASPARTATE, DEXTROAMPHETAMINE SULFATE AND AMPHETAMINE SULFATE 3.75; 3.75; 3.75; 3.75 MG/1; MG/1; MG/1; MG/1
15 TABLET ORAL DAILY
Qty: 30 TABLET | Refills: 0 | Status: SHIPPED | OUTPATIENT
Start: 2021-12-17 | End: 2022-02-14

## 2021-12-17 RX ORDER — QUETIAPINE FUMARATE 25 MG/1
TABLET, FILM COATED ORAL
Qty: 270 TABLET | Refills: 1 | Status: SHIPPED | OUTPATIENT
Start: 2021-12-17 | End: 2022-07-12

## 2021-12-17 RX ORDER — LAMOTRIGINE 200 MG/1
200 TABLET ORAL DAILY
Qty: 90 TABLET | Refills: 1 | Status: SHIPPED | OUTPATIENT
Start: 2021-12-17 | End: 2022-08-16

## 2021-12-17 RX ORDER — VENLAFAXINE HYDROCHLORIDE 75 MG/1
75 CAPSULE, EXTENDED RELEASE ORAL DAILY
Qty: 90 CAPSULE | Refills: 1 | Status: SHIPPED | OUTPATIENT
Start: 2021-12-17 | End: 2022-07-12

## 2021-12-17 RX ORDER — DEXTROAMPHETAMINE SACCHARATE, AMPHETAMINE ASPARTATE, DEXTROAMPHETAMINE SULFATE AND AMPHETAMINE SULFATE 7.5; 7.5; 7.5; 7.5 MG/1; MG/1; MG/1; MG/1
30 TABLET ORAL DAILY
Qty: 30 TABLET | Refills: 0 | Status: SHIPPED | OUTPATIENT
Start: 2021-12-17 | End: 2022-02-14

## 2021-12-17 RX ORDER — VENLAFAXINE HYDROCHLORIDE 150 MG/1
CAPSULE, EXTENDED RELEASE ORAL
Qty: 90 CAPSULE | Refills: 1 | Status: SHIPPED | OUTPATIENT
Start: 2021-12-17 | End: 2022-07-12

## 2021-12-17 ASSESSMENT — PATIENT HEALTH QUESTIONNAIRE - PHQ9
SUM OF ALL RESPONSES TO PHQ QUESTIONS 1-9: 8
5. POOR APPETITE OR OVEREATING: SEVERAL DAYS

## 2021-12-17 ASSESSMENT — ANXIETY QUESTIONNAIRES
1. FEELING NERVOUS, ANXIOUS, OR ON EDGE: SEVERAL DAYS
6. BECOMING EASILY ANNOYED OR IRRITABLE: MORE THAN HALF THE DAYS
5. BEING SO RESTLESS THAT IT IS HARD TO SIT STILL: SEVERAL DAYS
3. WORRYING TOO MUCH ABOUT DIFFERENT THINGS: SEVERAL DAYS
7. FEELING AFRAID AS IF SOMETHING AWFUL MIGHT HAPPEN: NOT AT ALL
IF YOU CHECKED OFF ANY PROBLEMS ON THIS QUESTIONNAIRE, HOW DIFFICULT HAVE THESE PROBLEMS MADE IT FOR YOU TO DO YOUR WORK, TAKE CARE OF THINGS AT HOME, OR GET ALONG WITH OTHER PEOPLE: SOMEWHAT DIFFICULT
2. NOT BEING ABLE TO STOP OR CONTROL WORRYING: SEVERAL DAYS
GAD7 TOTAL SCORE: 7

## 2021-12-17 ASSESSMENT — PAIN SCALES - GENERAL: PAINLEVEL: NO PAIN (0)

## 2021-12-17 ASSESSMENT — MIFFLIN-ST. JEOR: SCORE: 1262.74

## 2021-12-17 NOTE — PROGRESS NOTES
Assessment & Plan       ICD-10-CM    1. Attention deficit hyperactivity disorder (ADHD), predominantly inattentive type  F90.0 amphetamine-dextroamphetamine (ADDERALL) 15 MG tablet     amphetamine-dextroamphetamine (ADDERALL) 30 MG tablet   2. Moderate episode of recurrent major depressive disorder (H)  F33.1 lamoTRIgine (LAMICTAL) 200 MG tablet     venlafaxine (EFFEXOR-XR) 150 MG 24 hr capsule     venlafaxine (EFFEXOR-XR) 75 MG 24 hr capsule   3. Insomnia, unspecified type  G47.00 QUEtiapine (SEROQUEL) 25 MG tablet   4. Generalized anxiety disorder  F41.1 venlafaxine (EFFEXOR-XR) 150 MG 24 hr capsule     venlafaxine (EFFEXOR-XR) 75 MG 24 hr capsule   5. Encounter for screening mammogram for breast cancer  Z12.31 *MA Screening Digital Bilateral     Overall, she is doing well.  Her ADHD, depression and anxiety are well controlled with the Adderall, Effexor, Seroquel and Lamictal.  She has tolerating them well with no side effect.  No suicidal or homicidal ideation.  Today's PHQ-9 and JORDAN-7 score are 8 and 7 respectively.  Blood pressure looeds good and weight has been fairly stable.  No contraindication for Adderall identified.  She is on a higher dose of the Adderral and therefore will need to monitor that closely.  Will order labs at the next visit.  Will continue with the current medications and they were refilled.  Follow-up in 6 months, earlier if has any concerns or question.    Emphasized the importance of preventive care.  She is due for physical and cervical cancer screening.  She is planning to return in 6 months for them.  Mammogram was ordered today and she will get it done in a couple weeks.    Recommended influenza and Covid vaccination but she declined.  Risk and benefit discussed, she is willing to take the risk.       Tobacco Cessation:   reports that she has been smoking cigarettes. She started smoking about 14 years ago. She has a 4.50 pack-year smoking history. She has never used smokeless  tobacco.  Tobacco Cessation Action Plan: Information offered: Patient not interested at this time      Return in about 6 months (around 6/17/2022) for Physical Exam, folow up ADHD, depression/anxiety.    Ovidio Jenkins Mai, MD  [facility]    Camden Amador is a 46 year old who presents for the following health issues     HPI     Depression and Anxiety Follow-Up    How are you doing with your depression since your last visit? No change    How are you doing with your anxiety since your last visit?  No change    Are you having other symptoms that might be associated with depression or anxiety? No    Have you had a significant life event? No     Do you have any concerns with your use of alcohol or other drugs? No    Social History     Tobacco Use     Smoking status: Current Some Day Smoker     Packs/day: 0.50     Years: 9.00     Pack years: 4.50     Types: Cigarettes     Start date: 1/1/2008     Last attempt to quit: 6/15/2018     Years since quitting: 3.5     Smokeless tobacco: Never Used     Tobacco comment: less than .5 pack, started age 32   Vaping Use     Vaping Use: Never used   Substance Use Topics     Alcohol use: Yes     Alcohol/week: 0.0 standard drinks     Comment: occ.     Drug use: No     PHQ 3/19/2020 6/8/2020 12/17/2021   PHQ-9 Total Score 6 9 8   Q9: Thoughts of better off dead/self-harm past 2 weeks Not at all Not at all Not at all   F/U: Thoughts of suicide or self-harm - - -   F/U: Self harm-plan - - -   F/U: Self-harm action - - -   F/U: Safety concerns - - -     JORDAN-7 SCORE 1/9/2020 3/19/2020 12/17/2021   Total Score - - -   Total Score 10 (moderate anxiety) - -   Total Score 10 11 7     Last PHQ-9 12/17/2021   1.  Little interest or pleasure in doing things 0   2.  Feeling down, depressed, or hopeless 0   3.  Trouble falling or staying asleep, or sleeping too much 2   4.  Feeling tired or having little energy 1   5.  Poor appetite or overeating 1   6.  Feeling bad about yourself  1   7.  Trouble concentrating 2   8.  Moving slowly or restless 1   Q9: Thoughts of better off dead/self-harm past 2 weeks 0   PHQ-9 Total Score 8   Difficulty at work, home, or with people Somewhat difficult   In the past two weeks have you had thoughts of suicide or self harm? -   Do you have concerns about your personal safety or the safety of others? -   In the past 2 weeks have you thought about a plan or had intention to harm yourself? -   In the past 2 weeks have you acted on these thoughts in any way? -     JORDAN-7  12/17/2021   1. Feeling nervous, anxious, or on edge 1   2. Not being able to stop or control worrying 1   3. Worrying too much about different things 1   4. Trouble relaxing 1   5. Being so restless that it is hard to sit still 1   6. Becoming easily annoyed or irritable 2   7. Feeling afraid, as if something awful might happen 0   JORDAN-7 Total Score 7   If you checked any problems, how difficult have they made it for you to do your work, take care of things at home, or get along with other people? Somewhat difficult       Suicide Assessment Five-step Evaluation and Treatment (SAFE-T)      How many servings of fruits and vegetables do you eat daily?  0-1    On average, how many sweetened beverages do you drink each day (Examples: soda, juice, sweet tea, etc.  Do NOT count diet or artificially sweetened beverages)?   2    How many days per week do you exercise enough to make your heart beat faster? 3 or less    How many minutes a day do you exercise enough to make your heart beat faster? 9 or less    How many days per week do you miss taking your medication? 0    Medication Followup of Adderall     Taking Medication as prescribed: yes    Side Effects:  None    Medication Helping Symptoms:  yes     Marjorie was seen today for follow-up on her ADHD, depression and anxiety.  She was offered to switch to physical exam today but she declined.  She plans to return in 6 months for it; she is due for physical  "with cervical cancer screening.    She takes Adderall 45 mg daily for her ADHD and Lamictal, Seroquel and Effexor for her depression and anxiety.  They were initiated by the psychiatrist - has not seen a psychiatrist for over a year because she has been doing well with the current medications.  No side effect.  She feels her depression and anxiety are well controlled - not concern about them.  Her ADHD also has been well controlled.  No heart palpitation, headache or dizziness.  Sleeping okay with the Seroquel.  No mood swing.  With the Adderall, she has been able to focus, multitasking and be more efficient at work.  No drug or alcohol use.  No personal or family history of cardiomyopathy.  Overall, she is feeling great, no other concerns today.      Review of Systems   Constitutional, HEENT, cardiovascular, pulmonary, gi and gu systems are negative, except as otherwise noted.      Objective    /70   Pulse 96   Temp 98.2  F (36.8  C) (Temporal)   Resp 18   Ht 1.659 m (5' 5.3\")   Wt 62.7 kg (138 lb 4 oz)   LMP 08/21/2013   SpO2 100%   Breastfeeding No   BMI 22.80 kg/m    Body mass index is 22.8 kg/m .  Physical Exam   GENERAL: healthy, alert and no distress.    RESP: lungs clear to auscultation - no rales, rhonchi or wheezes  CV: regular rate and rhythm, normal S1 S2, no S3 or S4, no murmur.  Psy:  Dress appropriately.  Speech was normal and is easily comprehended.  Thought was in tact - no hallucination.        No results found for any visits on 12/17/21.         "

## 2021-12-18 ASSESSMENT — ANXIETY QUESTIONNAIRES: GAD7 TOTAL SCORE: 7

## 2022-01-16 ENCOUNTER — HEALTH MAINTENANCE LETTER (OUTPATIENT)
Age: 47
End: 2022-01-16

## 2022-02-17 ENCOUNTER — MYC REFILL (OUTPATIENT)
Dept: FAMILY MEDICINE | Facility: CLINIC | Age: 47
End: 2022-02-17
Payer: COMMERCIAL

## 2022-02-17 DIAGNOSIS — F90.0 ATTENTION DEFICIT HYPERACTIVITY DISORDER (ADHD), PREDOMINANTLY INATTENTIVE TYPE: ICD-10-CM

## 2022-02-17 RX ORDER — DEXTROAMPHETAMINE SACCHARATE, AMPHETAMINE ASPARTATE, DEXTROAMPHETAMINE SULFATE AND AMPHETAMINE SULFATE 7.5; 7.5; 7.5; 7.5 MG/1; MG/1; MG/1; MG/1
30 TABLET ORAL DAILY
Qty: 30 TABLET | Refills: 0 | Status: CANCELLED | OUTPATIENT
Start: 2022-02-17

## 2022-02-17 RX ORDER — DEXTROAMPHETAMINE SACCHARATE, AMPHETAMINE ASPARTATE, DEXTROAMPHETAMINE SULFATE AND AMPHETAMINE SULFATE 3.75; 3.75; 3.75; 3.75 MG/1; MG/1; MG/1; MG/1
15 TABLET ORAL DAILY
Qty: 30 TABLET | Refills: 0 | Status: CANCELLED | OUTPATIENT
Start: 2022-02-17

## 2022-02-17 NOTE — TELEPHONE ENCOUNTER
Requested Prescriptions   Pending Prescriptions Disp Refills     amphetamine-dextroamphetamine (ADDERALL) 15 MG tablet 30 tablet 0     Sig: Take 1 tablet (15 mg) by mouth daily               amphetamine-dextroamphetamine (ADDERALL) 30 MG tablet 30 tablet 0     Sig: Take 1 tablet (30 mg) by mouth daily     Both script sent on 02/15/2022 to pharmacy.   Ariana Leblanc MA

## 2022-03-13 ENCOUNTER — HEALTH MAINTENANCE LETTER (OUTPATIENT)
Age: 47
End: 2022-03-13

## 2022-06-17 ENCOUNTER — TELEPHONE (OUTPATIENT)
Dept: FAMILY MEDICINE | Facility: CLINIC | Age: 47
End: 2022-06-17
Payer: COMMERCIAL

## 2022-06-17 NOTE — TELEPHONE ENCOUNTER
Called and LM for patient to call back. Please relay below and help schedule appointments.   Ariana Leblanc MA

## 2022-06-17 NOTE — LETTER
[address] [id number] [phone]        June 17, 2022    Marjorie Holbrook  [address] [place] [id number]          Dear Marjorie,    We have attempted to reach you by phone in regards to your care. Dr. Toribio has recommended that you get your mammogram this was discussed doing your last visit and not completed. Please call the clinic at schedule at [phone]. You are also over due for your annual wellness visit and can schedule this at [phone].     Sincerely,        Ovidio Toribio MD

## 2022-06-17 NOTE — TELEPHONE ENCOUNTER
Please let patient know that I recommend her to get a mammogram as discussed at the last visit over 6 months ago.  She never had one done and she is recommended to get it done yearly, starting at 43.  I will extend the order and please help her to schedule for one if she is interested.  Also remind her that she is due for preventive care and recommend to follow up for the physical in the couple months.  thanks

## 2022-06-26 DIAGNOSIS — G47.00 INSOMNIA, UNSPECIFIED TYPE: ICD-10-CM

## 2022-06-26 DIAGNOSIS — F33.1 MODERATE EPISODE OF RECURRENT MAJOR DEPRESSIVE DISORDER (H): ICD-10-CM

## 2022-06-26 DIAGNOSIS — F41.1 GENERALIZED ANXIETY DISORDER: ICD-10-CM

## 2022-06-29 NOTE — TELEPHONE ENCOUNTER
Venlafaxine 150mg   Routing refill request to provider for review/approval because:  Labs out of range:  [facility]  Patient needs to be seen because:  6 month visit  PHQ-9    Seroquel   Routing refill request to provider for review/approval because:  Labs not current:  Lipid, CBC, A1c  Patient needs to be seen because:  6 month visit    Venlafaxine 75mg  Routing refill request to provider for review/approval because:  Labs not current:  [facility]  Patient needs to be seen because:  6 month visit  PHQ-9

## 2022-06-30 RX ORDER — VENLAFAXINE HYDROCHLORIDE 150 MG/1
CAPSULE, EXTENDED RELEASE ORAL
Qty: 90 CAPSULE | Refills: 1 | OUTPATIENT
Start: 2022-06-30

## 2022-06-30 RX ORDER — QUETIAPINE FUMARATE 25 MG/1
TABLET, FILM COATED ORAL
Qty: 270 TABLET | Refills: 1 | OUTPATIENT
Start: 2022-06-30

## 2022-06-30 RX ORDER — VENLAFAXINE HYDROCHLORIDE 75 MG/1
CAPSULE, EXTENDED RELEASE ORAL
Qty: 90 CAPSULE | Refills: 1 | OUTPATIENT
Start: 2022-06-30

## 2022-07-12 RX ORDER — QUETIAPINE FUMARATE 25 MG/1
TABLET, FILM COATED ORAL
Qty: 270 TABLET | Refills: 1 | Status: SHIPPED | OUTPATIENT
Start: 2022-07-12 | End: 2023-09-15

## 2022-07-12 RX ORDER — VENLAFAXINE HYDROCHLORIDE 150 MG/1
CAPSULE, EXTENDED RELEASE ORAL
Qty: 90 CAPSULE | Refills: 1 | Status: SHIPPED | OUTPATIENT
Start: 2022-07-12 | End: 2023-02-09

## 2022-07-12 RX ORDER — VENLAFAXINE HYDROCHLORIDE 75 MG/1
75 CAPSULE, EXTENDED RELEASE ORAL DAILY
Qty: 90 CAPSULE | Refills: 1 | Status: SHIPPED | OUTPATIENT
Start: 2022-07-12 | End: 2023-02-09

## 2022-08-03 ENCOUNTER — MYC REFILL (OUTPATIENT)
Dept: FAMILY MEDICINE | Facility: CLINIC | Age: 47
End: 2022-08-03

## 2022-08-03 DIAGNOSIS — F90.0 ATTENTION DEFICIT HYPERACTIVITY DISORDER (ADHD), PREDOMINANTLY INATTENTIVE TYPE: ICD-10-CM

## 2022-08-03 RX ORDER — DEXTROAMPHETAMINE SACCHARATE, AMPHETAMINE ASPARTATE, DEXTROAMPHETAMINE SULFATE AND AMPHETAMINE SULFATE 3.75; 3.75; 3.75; 3.75 MG/1; MG/1; MG/1; MG/1
15 TABLET ORAL DAILY
Qty: 30 TABLET | Refills: 0 | Status: SHIPPED | OUTPATIENT
Start: 2022-08-03 | End: 2022-09-08

## 2022-08-03 RX ORDER — DEXTROAMPHETAMINE SACCHARATE, AMPHETAMINE ASPARTATE, DEXTROAMPHETAMINE SULFATE AND AMPHETAMINE SULFATE 7.5; 7.5; 7.5; 7.5 MG/1; MG/1; MG/1; MG/1
30 TABLET ORAL DAILY
Qty: 30 TABLET | Refills: 0 | Status: SHIPPED | OUTPATIENT
Start: 2022-08-03 | End: 2022-09-08

## 2022-08-03 NOTE — TELEPHONE ENCOUNTER
Requested Prescriptions   Pending Prescriptions Disp Refills        amphetamine-dextroamphetamine (ADDERALL) 30 MG tablet 30 tablet 0     Sig: Take 1 tablet (30 mg) by mouth daily       There is no refill protocol information for this order            Last Written Prescription Date:  05/20/2022  Last Fill Quantity: 30,   # refills: 0  Last Office Visit: 12/17/2021  Future Office visit:       Routing refill request to provider for review/approval because:  Drug not on the [facility] or [facility] refill protocol or controlled substance      Requested Prescriptions   Pending Prescriptions Disp Refills     amphetamine-dextroamphetamine (ADDERALL) 15 MG tablet 30 tablet 0     Sig: Take 1 tablet (15 mg) by mouth daily       There is no refill protocol information for this order        Last Written Prescription Date:  05/20/2022  Last Fill Quantity: 30,   # refills: 0  Last Office Visit: 12/17/2021  Future Office visit:       Routing refill request to provider for review/approval because:  Drug not on the [facility] or [facility] refill protocol or controlled substance

## 2022-08-16 DIAGNOSIS — F33.1 MODERATE EPISODE OF RECURRENT MAJOR DEPRESSIVE DISORDER (H): ICD-10-CM

## 2022-08-16 RX ORDER — LAMOTRIGINE 200 MG/1
TABLET ORAL
Qty: 90 TABLET | Refills: 0 | Status: SHIPPED | OUTPATIENT
Start: 2022-08-16 | End: 2022-12-08

## 2022-08-16 NOTE — TELEPHONE ENCOUNTER
"Requested Prescriptions   Pending Prescriptions Disp Refills    lamoTRIgine (LAMICTAL) 200 MG tablet [Pharmacy Med Name: LAMOTRIGINE 200MG TABS] 90 tablet 0     Sig: TAKE 1 TABLET BY MOUTH DAILY        Anti-Seizure Meds Protocol  Failed - 8/16/2022  1:05 AM        Failed - Review Authorizing provider's last note.      Refer to last progress notes: confirm request is for original authorizing provider (cannot be through other providers).          Failed - Normal CBC on file in past 26 months     Recent Labs   Lab Test 12/04/20  1512   WBC 4.4   RBC 3.70*   HGB 12.2   HCT 35.9                      Passed - Recent (12 mo) or future (30 days) visit within the authorizing provider's specialty     Patient has had an office visit with the authorizing provider or a provider within the authorizing providers department within the previous 12 mos or has a future within next 30 days. See \"Patient Info\" tab in inbasket, or \"Choose Columns\" in Meds & Orders section of the refill encounter.              Passed - Normal ALT or AST on file in past 26 months       Recent Labs   Lab Test 12/04/20  1512   ALT 16     Recent Labs   Lab Test 12/04/20  1512   AST 7             Passed - Normal platelet count on file in past 26 months       Recent Labs   Lab Test 12/04/20  1512                  Passed - Medication is active on med list        Passed - No active pregnancy on record        Passed - No positive pregnancy test in last 12 months               Emerald Jacinto RN  "

## 2022-08-17 DIAGNOSIS — F33.1 MODERATE EPISODE OF RECURRENT MAJOR DEPRESSIVE DISORDER (H): ICD-10-CM

## 2022-08-17 RX ORDER — LAMOTRIGINE 200 MG/1
TABLET ORAL
Qty: 90 TABLET | Refills: 0 | OUTPATIENT
Start: 2022-08-17

## 2022-08-17 NOTE — TELEPHONE ENCOUNTER
Prescription was sent 8/16/22 for #90 with 0 refills.  Pharmacy notified via E-Prescribe refusal.    LUIS DANIEL CruzN, RN

## 2022-08-18 DIAGNOSIS — F33.1 MODERATE EPISODE OF RECURRENT MAJOR DEPRESSIVE DISORDER (H): ICD-10-CM

## 2022-08-18 RX ORDER — LAMOTRIGINE 200 MG/1
TABLET ORAL
Qty: 90 TABLET | Refills: 0 | OUTPATIENT
Start: 2022-08-18

## 2022-08-19 DIAGNOSIS — F33.1 MODERATE EPISODE OF RECURRENT MAJOR DEPRESSIVE DISORDER (H): ICD-10-CM

## 2022-08-23 RX ORDER — LAMOTRIGINE 200 MG/1
TABLET ORAL
Qty: 90 TABLET | Refills: 0 | OUTPATIENT
Start: 2022-08-23

## 2022-10-12 ENCOUNTER — TELEPHONE (OUTPATIENT)
Dept: FAMILY MEDICINE | Facility: CLINIC | Age: 47
End: 2022-10-12

## 2022-10-13 NOTE — TELEPHONE ENCOUNTER
Please let patient know that I am extending her mammogram order, please get it done asap to screen for breast cancer.  She never had one done; she is 4 years overdue.  Thanks.  Ovidio

## 2022-11-18 ENCOUNTER — OFFICE VISIT (OUTPATIENT)
Dept: FAMILY MEDICINE | Facility: CLINIC | Age: 47
End: 2022-11-18
Payer: COMMERCIAL

## 2022-11-18 VITALS
TEMPERATURE: 97.8 F | HEART RATE: 84 BPM | WEIGHT: 141 LBS | SYSTOLIC BLOOD PRESSURE: 100 MMHG | HEIGHT: 65 IN | DIASTOLIC BLOOD PRESSURE: 68 MMHG | RESPIRATION RATE: 16 BRPM | OXYGEN SATURATION: 99 % | BODY MASS INDEX: 23.49 KG/M2

## 2022-11-18 DIAGNOSIS — E55.9 VITAMIN D DEFICIENCY: ICD-10-CM

## 2022-11-18 DIAGNOSIS — Z00.00 ROUTINE GENERAL MEDICAL EXAMINATION AT A HEALTH CARE FACILITY: Primary | ICD-10-CM

## 2022-11-18 DIAGNOSIS — F90.0 ATTENTION DEFICIT HYPERACTIVITY DISORDER (ADHD), PREDOMINANTLY INATTENTIVE TYPE: ICD-10-CM

## 2022-11-18 DIAGNOSIS — F60.3 BORDERLINE PERSONALITY DISORDER (H): ICD-10-CM

## 2022-11-18 DIAGNOSIS — F33.1 MODERATE EPISODE OF RECURRENT MAJOR DEPRESSIVE DISORDER (H): ICD-10-CM

## 2022-11-18 DIAGNOSIS — F17.200 TOBACCO USE DISORDER: ICD-10-CM

## 2022-11-18 LAB
ALBUMIN SERPL-MCNC: 3.9 G/DL (ref 3.4–5)
ALP SERPL-CCNC: 99 U/L (ref 40–150)
ALT SERPL W P-5'-P-CCNC: 21 U/L (ref 0–50)
ANION GAP SERPL CALCULATED.3IONS-SCNC: 2 MMOL/L (ref 3–14)
AST SERPL W P-5'-P-CCNC: 13 U/L (ref 0–45)
BILIRUB SERPL-MCNC: 0.4 MG/DL (ref 0.2–1.3)
BUN SERPL-MCNC: 12 MG/DL (ref 7–30)
CALCIUM SERPL-MCNC: 9 MG/DL (ref 8.5–10.1)
CHLORIDE BLD-SCNC: 108 MMOL/L (ref 94–109)
CO2 SERPL-SCNC: 31 MMOL/L (ref 20–32)
CREAT SERPL-MCNC: 0.71 MG/DL (ref 0.52–1.04)
DEPRECATED CALCIDIOL+CALCIFEROL SERPL-MC: 17 UG/L (ref 20–75)
ERYTHROCYTE [DISTWIDTH] IN BLOOD BY AUTOMATED COUNT: 11.9 % (ref 10–15)
GFR SERPL CREATININE-BSD FRML MDRD: >90 ML/MIN/1.73M2
GLUCOSE BLD-MCNC: 94 MG/DL (ref 70–99)
HCT VFR BLD AUTO: 38.1 % (ref 35–47)
HGB BLD-MCNC: 12.9 G/DL (ref 11.7–15.7)
MCH RBC QN AUTO: 32.7 PG (ref 26.5–33)
MCHC RBC AUTO-ENTMCNC: 33.9 G/DL (ref 31.5–36.5)
MCV RBC AUTO: 97 FL (ref 78–100)
PLATELET # BLD AUTO: 304 10E3/UL (ref 150–450)
POTASSIUM BLD-SCNC: 4.5 MMOL/L (ref 3.4–5.3)
PROT SERPL-MCNC: 6.9 G/DL (ref 6.8–8.8)
RBC # BLD AUTO: 3.95 10E6/UL (ref 3.8–5.2)
SODIUM SERPL-SCNC: 141 MMOL/L (ref 133–144)
TSH SERPL DL<=0.005 MIU/L-ACNC: 0.9 MU/L (ref 0.4–4)
WBC # BLD AUTO: 5.2 10E3/UL (ref 4–11)

## 2022-11-18 PROCEDURE — 36415 COLL VENOUS BLD VENIPUNCTURE: CPT | Performed by: FAMILY MEDICINE

## 2022-11-18 PROCEDURE — 99396 PREV VISIT EST AGE 40-64: CPT | Performed by: FAMILY MEDICINE

## 2022-11-18 PROCEDURE — 82306 VITAMIN D 25 HYDROXY: CPT | Performed by: FAMILY MEDICINE

## 2022-11-18 PROCEDURE — 84443 ASSAY THYROID STIM HORMONE: CPT | Performed by: FAMILY MEDICINE

## 2022-11-18 PROCEDURE — 99214 OFFICE O/P EST MOD 30 MIN: CPT | Mod: 25 | Performed by: FAMILY MEDICINE

## 2022-11-18 PROCEDURE — 80053 COMPREHEN METABOLIC PANEL: CPT | Performed by: FAMILY MEDICINE

## 2022-11-18 PROCEDURE — 85027 COMPLETE CBC AUTOMATED: CPT | Performed by: FAMILY MEDICINE

## 2022-11-18 ASSESSMENT — ENCOUNTER SYMPTOMS
HEADACHES: 0
HEMATURIA: 0
FEVER: 0
DIARRHEA: 0
WEAKNESS: 0
DIZZINESS: 0
MYALGIAS: 0
COUGH: 0
EYE PAIN: 0
NERVOUS/ANXIOUS: 0
DYSURIA: 0
NAUSEA: 0
HEMATOCHEZIA: 0
ABDOMINAL PAIN: 0
FREQUENCY: 0
ARTHRALGIAS: 0
CHILLS: 0
JOINT SWELLING: 0
PALPITATIONS: 0
HEARTBURN: 0
SORE THROAT: 0
PARESTHESIAS: 0
BREAST MASS: 0
SHORTNESS OF BREATH: 0
CONSTIPATION: 0

## 2022-11-18 ASSESSMENT — PATIENT HEALTH QUESTIONNAIRE - PHQ9
SUM OF ALL RESPONSES TO PHQ QUESTIONS 1-9: 6
10. IF YOU CHECKED OFF ANY PROBLEMS, HOW DIFFICULT HAVE THESE PROBLEMS MADE IT FOR YOU TO DO YOUR WORK, TAKE CARE OF THINGS AT HOME, OR GET ALONG WITH OTHER PEOPLE: SOMEWHAT DIFFICULT
SUM OF ALL RESPONSES TO PHQ QUESTIONS 1-9: 6

## 2022-11-18 ASSESSMENT — PAIN SCALES - GENERAL: PAINLEVEL: NO PAIN (0)

## 2022-11-18 NOTE — Clinical Note
Please update her pap had a hysterectomy in 4/2015 at [facility] for pelvic pain. No pap is needed in the future

## 2022-11-18 NOTE — PROGRESS NOTES
SUBJECTIVE:   CC: Marjorie is an 47 year old who presents for preventive health visit.     Healthy Habits:     Getting at least 3 servings of Calcium per day:  NO    Bi-annual eye exam:  Yes    Dental care twice a year:  NO    Sleep apnea or symptoms of sleep apnea:  Daytime drowsiness    Diet:  Regular (no restrictions)    Frequency of exercise:  None    Taking medications regularly:  Yes    Medication side effects:  None    PHQ-2 Total Score: 2    Additional concerns today:  No    Marjorie is here today for physical and general follow-up with a few concerns.    First concern is her mood.  States she has been more down and struggling with attention over the last few weeks -no known trigger. States inattention does not interfere with her work.  No suicidal or homicidal ideation. She would like to know if there are any possible medication adjustments to be made.  She is known to have depression, borderline personality and ADHD.  She was seeing a psychiatrist for it - graduated from psychiatry service as she was doing well with the medications.  Been doing well with the Adderall, Lamictal, Effexor and Seroquel - no side effect.  Tolerated the medications well.  Been taking them as prescribed.  Excessive stress.  No depression or anxiety.  No drugs or alcohol.      Otherwise she is doing well.  Last physical was 1 year ago and was normal.  She is due for mammogram today but up-to-date for colonoscopy and Pap smears.  Smokes approximately 1 to 2 cigarettes a day.  No alcohol or drug use.  Lives at home with .  Feels safe.  Denies chest pain, shortness of breath.  No N/V/C/D.  No nasal congestion, cough, fevers, or chills.  No problem with urination.  No abdominal pain.  Had a hysterectomy secondary to heavy and painful menstrual bleeding.  Stated that she has history of abnormal pap or STD. Denied of STD risks.  No leg swelling or pain.  Not exercising. Social drinker but no drug. No problem with sleeping.  No weight  change.  No other concerns today.    Today's PHQ-2 Score:   PHQ-2 (  [employer]) 2022   Q1: Little interest or pleasure in doing things 1   Q2: Feeling down, depressed or hopeless 1   PHQ-2 Score 2   PHQ-2 Total Score (12-17 Years)- Positive if 3 or more points; Administer PHQ-A if positive -   Q1: Little interest or pleasure in doing things Several days   Q2: Feeling down, depressed or hopeless Several days   PHQ-2 Score 2     Answers for HPI/ROS submitted by the patient on 2022  If you checked off any problems, how difficult have these problems made it for you to do your work, take care of things at home, or get along with other people?: Somewhat difficult  PHQ9 TOTAL SCORE: 6    Have you ever done Advance Care Planning? (For example, a Health Directive, POLST, or a discussion with a medical provider or your loved ones about your wishes): No, advance care planning information given to patient to review.  Patient plans to discuss their wishes with loved ones or provider.      Social History     Tobacco Use     Smoking status: Some Days     Packs/day: 0.50     Years: 9.00     Pack years: 4.50     Types: Cigarettes     Start date: 2008     Last attempt to quit: 6/15/2018     Years since quittin.4     Smokeless tobacco: Never     Tobacco comments:     less than .5 pack, started age 32   Substance Use Topics     Alcohol use: Yes     Alcohol/week: 0.0 standard drinks     Comment: occ.         Alcohol Use 2022   Prescreen: >3 drinks/day or >7 drinks/week? No       Reviewed orders with patient.  Reviewed health maintenance and updated orders accordingly - Yes  Lab work is in process  BP Readings from Last 3 Encounters:   22 100/68   21 118/70   20 96/68    Wt Readings from Last 3 Encounters:   22 64 kg (141 lb)   21 62.7 kg (138 lb 4 oz)   20 58.4 kg (128 lb 12.8 oz)                  Patient Active Problem List   Diagnosis     Tobacco use disorder     Attention  deficit hyperactivity disorder (ADHD), predominantly inattentive type     H/O hysterectomy for benign disease     Moderate episode of recurrent major depressive disorder (H)     Insomnia, unspecified type     Borderline personality disorder (H)     Atypical squamous cells of undetermined significance on cytologic smear of cervix (ASC-US)     Past Surgical History:   Procedure Laterality Date     ABDOMEN SURGERY      recent surgery see records     BIOPSY      recent see records     COLONOSCOPY N/A 10/27/2014    Procedure: COLONOSCOPY;  Surgeon: Rashawn Mayer MD;  Location: [facility]     CYSTOSCOPY N/A 2015    Procedure: CYSTOSCOPY;  Surgeon: Rk العلي MD;  Location: [facility]     DILATION AND CURETTAGE, HYSTEROSCOPY, ABLATE ENDOMETRIUM NOVASURE, COMBINED  2013    Procedure: COMBINED DILATION AND CURETTAGE, HYSTEROSCOPY, ABLATE ENDOMETRIUM NOVASURE;  Hysteroscopy, Novasure Endometrial Ablation, Currettings;  Surgeon: Michaelle Duran MD;  Location: [facility]     LAPAROSCOPIC HYSTERECTOMY TOTAL N/A 2015    Procedure: LAPAROSCOPIC HYSTERECTOMY TOTAL;  Surgeon: Rk العلي MD;  Location: [facility]     LAPAROSCOPIC HYSTERECTOMY TOTAL       LAPAROSCOPY DIAGNOSTIC (GYN) N/A 2014    Procedure: LAPAROSCOPY DIAGNOSTIC (GYN);  Surgeon: Rk العلي MD;  Location: [facility]     SALPINGECTOMY Bilateral 2015    Procedure: SALPINGECTOMY;  Surgeon: Rk العلي MD;  Location: [facility]     TONSILLECTOMY  1996     TUBAL LIGATION         Social History     Tobacco Use     Smoking status: Some Days     Packs/day: 0.50     Years: 9.00     Pack years: 4.50     Types: Cigarettes     Start date: 2008     Last attempt to quit: 6/15/2018     Years since quittin.4     Smokeless tobacco: Never     Tobacco comments:     1-2 per day, started age 32   Substance Use Topics     Alcohol use: Yes     Alcohol/week: 0.0 standard drinks     Comment: occ.     Family History    Problem Relation Age of Onset     Thyroid Disease Mother      Alcohol/Drug Father      Respiratory Father         COPD     Depression Father      Heart Disease Father         A-Fib     Hypertension Maternal Grandmother      Cerebrovascular Disease Maternal Grandfather      Unknown/Adopted Paternal Grandmother      Unknown/Adopted Paternal Grandfather      No Known Problems Brother      No Known Problems Sister      No Known Problems Brother      Family History Negative Child          Current Outpatient Medications   Medication Sig Dispense Refill     amphetamine-dextroamphetamine (ADDERALL) 15 MG tablet TAKE ONE TABLET BY MOUTH ONCE DAILY 30 tablet 0     amphetamine-dextroamphetamine (ADDERALL) 30 MG tablet TAKE ONE TABLET BY MOUTH ONCE DAILY 30 tablet 0     lamoTRIgine (LAMICTAL) 200 MG tablet TAKE 1 TABLET BY MOUTH DAILY 90 tablet 0     QUEtiapine (SEROQUEL) 25 MG tablet TAKE 1/2 TO 1 TABLET (12.5 TO 25 MG) BY MOUTH DURING THE DAY AS NEEDED FOR ANXIETY AND TAKE 1 TO 2 TABLETS (25 TO 50 MG) AT BEDTIME FOR SLEEP AS NEEDED 270 tablet 1     venlafaxine (EFFEXOR XR) 150 MG 24 hr capsule TAKE ONE CAPSULE BY MOUTH ONCE DAILY WITH A 75MG CAPSULE TO TOTAL 225MG DAILY 90 capsule 1     venlafaxine (EFFEXOR XR) 75 MG 24 hr capsule Take 1 capsule (75 mg) by mouth daily Take it with then Effexor 150 mg to make 225 mg daily 90 capsule 1       Breast Cancer Screening:    FHS-7: No flowsheet data found.    Mammogram Screening: Recommended annual mammography. Referral sent today.  Pertinent mammograms are reviewed under the imaging tab.    History of abnormal Pap smear: YES - updated in Problem List and Health Maintenance accordingly  Status post benign hysterectomy. Health Maintenance and Surgical History updated.  PAP / HPV 10/9/2014   PAP (Historical) ASC-US(A)     Reviewed and updated as needed this visit by clinical staff   Tobacco  Allergies  Meds   Med Hx  Surg Hx  Fam Hx  Soc Hx        Reviewed and updated as needed  "this visit by Provider   Tobacco  Allergies  Meds   Med Hx  Surg Hx  Fam Hx  Soc Hx       Past Medical History:   Diagnosis Date     Arthritis     maternal grandmother     ASCUS favoring benign 10/9/14     Asthma      Pyelonephritis 10/2011     Recurrent major depression in partial remission (H) 2/17/2010        Review of Systems   Constitutional: Negative for chills and fever.   HENT: Negative for congestion, ear pain, hearing loss and sore throat.    Eyes: Negative for pain and visual disturbance.   Respiratory: Negative for cough and shortness of breath.    Cardiovascular: Negative for chest pain, palpitations and peripheral edema.   Gastrointestinal: Negative for abdominal pain, constipation, diarrhea, heartburn, hematochezia and nausea.   Breasts:  Negative for tenderness, breast mass and discharge.   Genitourinary: Negative for dysuria, frequency, genital sores, hematuria, pelvic pain, urgency, vaginal bleeding and vaginal discharge.   Musculoskeletal: Negative for arthralgias, joint swelling and myalgias.   Skin: Negative for rash.   Neurological: Negative for dizziness, weakness, headaches and paresthesias.   Psychiatric/Behavioral: Negative for mood changes. The patient is not nervous/anxious.         OBJECTIVE:   /68 (Cuff Size: Adult Regular)   Pulse 84   Temp 97.8  F (36.6  C) (Temporal)   Resp 16   Ht 1.651 m (5' 5\")   Wt 64 kg (141 lb)   LMP 08/21/2013   SpO2 99%   BMI 23.46 kg/m    Physical Exam   GENERAL: healthy, alert and no distress.  Speaking in full sentences.  EYES: Eyes grossly normal to inspection, PERRL and conjunctivae and sclerae normal.  No nystagmus.  All 4 visual fields intact.  HENT: ear canals and TM's normal.  Nares are non-congested. Oropharynx is pink and moist. No tender with palpation to the sinuses.   NECK: no adenopathy, supple, no lymphadenopathy or thyromegaly.  No tender with palpation to the cervical spine or its paraspinous muscle.  RESP: lungs clear to " auscultation - no rales, rhonchi or wheezes.  Good respiratory effort throughout.  BREAST: Offered but she declined.  She has no concern about it.   CV: regular rate and rhythm, no murmur  ABDOMEN: soft, nontender, nondistended, no palpable masses organomegaly with normal culture.  No CVA tenderness.   (female): Offered but she declined.  She has no concern about  MS: no gross musculoskeletal defects noted, no edema. All joints are in the normal range of motion and 4 extremities were equally in strength. Hips, knees, ankles, shoulders, elbows, wrists exams were normal. Fine motor skills of the fingers are intact. Back is straight, no tender with palpation to the spine.  SKIN: no suspicious lesions or rashes aside from healing cold sore on lip.  NEURO: Normal strength and tone, mentation intact and speech normal.  Cranial nerve II through XII intact.  DTRs +2 throughout.  No focal neurological deficit.  PSYCH: mentation appears normal, affect normal/bright.  Thoughts intact, suicidal/homicidal ideation.  No hallucination.     Diagnostic Test Results:  Labs reviewed in Epic  No results found for this or any previous visit (from the past 24 hour(s)).     Results for orders placed or performed in visit on 11/18/22   CBC with platelets     Status: Normal   Result Value Ref Range    WBC Count 5.2 4.0 - 11.0 10e3/uL    RBC Count 3.95 3.80 - 5.20 10e6/uL    Hemoglobin 12.9 11.7 - 15.7 g/dL    Hematocrit 38.1 35.0 - 47.0 %    MCV 97 78 - 100 fL    MCH 32.7 26.5 - 33.0 pg    MCHC 33.9 31.5 - 36.5 g/dL    RDW 11.9 10.0 - 15.0 %    Platelet Count 304 150 - 450 10e3/uL   Vitamin D Deficiency     Status: Abnormal   Result Value Ref Range    Vitamin D, Total (25-Hydroxy) 17 (L) 20 - 75 ug/L    Narrative    Season, race, dietary intake, and treatment affect the concentration of 25-hydroxy-Vitamin D. Values may decrease during winter months and increase during summer months. Values 20-29 ug/L may indicate Vitamin D insufficiency  and values <20 ug/L may indicate Vitamin D deficiency.    Vitamin D determination is routinely performed by an immunoassay specific for 25 hydroxyvitamin D3.  If an individual is on vitamin D2(ergocalciferol) supplementation, please specify 25 OH vitamin D2 and D3 level determination by LCMSMS test VITD23.     TSH with free T4 reflex     Status: Normal   Result Value Ref Range    TSH 0.90 0.40 - 4.00 mU/L   Comprehensive metabolic panel (BMP + Alb, Alk Phos, ALT, AST, Total. Bili, TP)     Status: Abnormal   Result Value Ref Range    Sodium 141 133 - 144 mmol/L    Potassium 4.5 3.4 - 5.3 mmol/L    Chloride 108 94 - 109 mmol/L    Carbon Dioxide (CO2) 31 20 - 32 mmol/L    Anion Gap 2 (L) 3 - 14 mmol/L    Urea Nitrogen 12 7 - 30 mg/dL    Creatinine 0.71 0.52 - 1.04 mg/dL    Calcium 9.0 8.5 - 10.1 mg/dL    Glucose 94 70 - 99 mg/dL    Alkaline Phosphatase 99 40 - 150 U/L    AST 13 0 - 45 U/L    ALT 21 0 - 50 U/L    Protein Total 6.9 6.8 - 8.8 g/dL    Albumin 3.9 3.4 - 5.0 g/dL    Bilirubin Total 0.4 0.2 - 1.3 mg/dL    GFR Estimate >90 >60 mL/min/1.73m2        ASSESSMENT/PLAN:   (Z00.00) Routine general medical examination at a health care facility  (primary encounter diagnosis)  Generally Marjorie is doing well.  Her depression, anxiety and ADHD overall controlled with current medications.  She is due for mammogram.  Up-to-date for colonoscopy and no longer needs Pap smears as she is s/p benign hysterectomy (4/2015 for pelvic pain).  Pap smear the year prior to the hysterectomy was ASCUS.  Never been checked for HPV.  Recommend pap with HPV, if normal and negative then done with pap.  She declined.  Offered but declined flu shot, COVID, and pneumonia immunizations today.  Mammogram ordered today.  Lipids were excellent last year and patient does not wish to check this year.  Labs as below for general screening.  Encouraged 30 minutes of exercise daily and healthy diet choices.  Follow-up in 1 year.    - *MA Screening Digital  Bilateral  - Comprehensive metabolic panel (BMP + Alb, Alk Phos, ALT, AST, Total. Bili, TP)      (F33.1) Moderate episode of recurrent major depressive disorder (H)  (F60.3) Borderline personality disorder (H)  (F90.0) Attention deficit hyperactivity disorder (ADHD), predominantly inattentive type    Her ADHD, depression and anxiety are controlled with the Adderall, Effexor, Seroquel and Lamictal.  She tolerates these well with no side effects. Today's PHQ-9 score is a 6.  No suicidal homicidal ideation.  Decreased attention and motivation lately.  Discussed this may be related to depression or anxiety.  Discussed possibility of moving up Lamictal if mood does not improve in the next couple weeks. Otherwise she is at max dose for most medications.  Blood pressure and weight are stable.  Labs as below to evaluate for possible etiology of decreased mood.  Follow-up in 6 months.    - TSH with free T4 reflex  - CBC with platelets  - Vit D deficiency       (E55.9) vitamin D deficiency  Her vitamin D is slightly low today.  Recommend OTC supplements: 7736-0749 units daily.    (F17.200) Tobacco use disorder  Patient picked up smoking 32 years old.  States she smokes 1 to 2 cigarettes daily.  Discussed possible outcomes for smoking and encouraged her to quit, but patient not ready at this time.    Patient has been advised of split billing requirements and indicates understanding: Yes      COUNSELING:  Reviewed preventive health counseling, as reflected in patient instructions       Regular exercise       Healthy diet/nutrition       Colorectal Cancer Screening        She reports that she has been smoking cigarettes. She started smoking about 14 years ago. She has a 4.50 pack-year smoking history. She has never used smokeless tobacco.  Nicotine/Tobacco Cessation Plan:   Information offered: Patient not interested at this time           Ashley Duran, MS3  [facility]    Provider Disclosure:  I  agree with above History, Review of Systems, Physical exam and Plan. I have reviewed the content of the documentation and have edited it as needed. I have personally performed the services documented here and the documentation accurately represents those services and the decisions I have made.    Ovidio Jenkins Mai, MD  [facility]

## 2022-11-20 PROBLEM — E55.9 VITAMIN D DEFICIENCY: Status: ACTIVE | Noted: 2022-11-20

## 2023-02-21 ENCOUNTER — TELEPHONE (OUTPATIENT)
Dept: FAMILY MEDICINE | Facility: CLINIC | Age: 48
End: 2023-02-21
Payer: COMMERCIAL

## 2023-02-21 NOTE — LETTER
"[address] [id number] [phone]        February 21, 2023    Marjorie Holbrook  [address] [place] [address]          Dear Marjorie,    This is a reminder that it is time to make your appointment for your annual breast imaging. Our records indicate your last breast imaging was performed on 3/10/21. You may make an appointment for your breast imaging by calling our scheduling line [phone] or [phone] or by visiting [facility], clicking on the \"Visits\" tab and selecting \"Schedule An Appointment.    If you are experiencing breast symptoms or concerns such as a new lump or breast pain you should first contact your health care team before scheduling your breast imaging.  Your healthcare professional may want to see you prior to your visit.    As you know, early detection of cancer is very important. Currently the American College of Radiology and the Society of Breast Imaging recommend annual breast imaging beginning at the age of 40.    If you are a patient currently enrolled in the [place] [facility] or the [facility], you must be seen by your health care team for a clinical breast examination prior to your breast imaging.    If you have already made an appointment, please disregard this letter.    Thank you and we look forward to seeing you in the near future for your annual breast imaging.    Sincerely,    Your Care Team  [facility]                 "

## 2023-03-07 ENCOUNTER — TELEPHONE (OUTPATIENT)
Dept: FAMILY MEDICINE | Facility: CLINIC | Age: 48
End: 2023-03-07
Payer: COMMERCIAL

## 2023-03-07 NOTE — TELEPHONE ENCOUNTER
Patient calling in regarding Adderall being unavailable at any pharmacy. Per pharmacist closest substitute would be Ritalin. Per chart review patient did send in [employer] message to Dr. Toribio yesterday.     Per clipkit message from Dr. Toribio this morning:   Ovidio Toribio MD  to Marjorie Holbrook           6:51 AM  Marjorie,  It is impossible for me to find out what medication is available at each pharmacy as each pharmacy has its own supplies.  I agree with the Ritalin, I sent in the 20 mg to take it twice a day as needed.  I prescribed only 30 tablets as I am not sure how it will workfor you.  Let me know how you feel with it and then will go from there.  Thank you.  Ovidio Toribio MD.     RN did review this message with patient. She verbalizes understanding and is VERY thankful. She will let us know how she is feeling on this. Denied any other questions or concerns at this time.     Rachel Adame RN on 3/7/2023 at 8:44 AM

## 2023-04-20 ENCOUNTER — HOSPITAL ENCOUNTER (EMERGENCY)
Facility: CLINIC | Age: 48
Discharge: HOME | End: 2023-04-21
Attending: FAMILY MEDICINE | Admitting: FAMILY MEDICINE
Payer: COMMERCIAL

## 2023-04-20 VITALS
TEMPERATURE: 98 F | RESPIRATION RATE: 18 BRPM | HEART RATE: 110 BPM | DIASTOLIC BLOOD PRESSURE: 72 MMHG | SYSTOLIC BLOOD PRESSURE: 141 MMHG | OXYGEN SATURATION: 99 %

## 2023-04-20 DIAGNOSIS — F60.3 BORDERLINE PERSONALITY DISORDER (H): ICD-10-CM

## 2023-04-20 DIAGNOSIS — F90.0 ATTENTION DEFICIT HYPERACTIVITY DISORDER (ADHD), PREDOMINANTLY INATTENTIVE TYPE: ICD-10-CM

## 2023-04-20 DIAGNOSIS — F33.1 MODERATE EPISODE OF RECURRENT MAJOR DEPRESSIVE DISORDER (H): ICD-10-CM

## 2023-04-20 LAB
ANION GAP SERPL CALCULATED.3IONS-SCNC: 12 MMOL/L (ref 7–15)
BASOPHILS # BLD AUTO: 0.1 10E3/UL (ref 0–0.2)
BASOPHILS NFR BLD AUTO: 1 %
BUN SERPL-MCNC: 15.1 MG/DL (ref 6–20)
CALCIUM SERPL-MCNC: 9.3 MG/DL (ref 8.6–10)
CHLORIDE SERPL-SCNC: 103 MMOL/L (ref 98–107)
CREAT SERPL-MCNC: 0.87 MG/DL (ref 0.51–0.95)
DEPRECATED HCO3 PLAS-SCNC: 23 MMOL/L (ref 22–29)
EOSINOPHIL # BLD AUTO: 0.1 10E3/UL (ref 0–0.7)
EOSINOPHIL NFR BLD AUTO: 2 %
ERYTHROCYTE [DISTWIDTH] IN BLOOD BY AUTOMATED COUNT: 11.9 % (ref 10–15)
ETHANOL SERPL-MCNC: <0.01 G/DL
GFR SERPL CREATININE-BSD FRML MDRD: 81 ML/MIN/1.73M2
GLUCOSE SERPL-MCNC: 115 MG/DL (ref 70–99)
HCT VFR BLD AUTO: 35 % (ref 35–47)
HGB BLD-MCNC: 11.9 G/DL (ref 11.7–15.7)
IMM GRANULOCYTES # BLD: 0 10E3/UL
IMM GRANULOCYTES NFR BLD: 0 %
LYMPHOCYTES # BLD AUTO: 1.7 10E3/UL (ref 0.8–5.3)
LYMPHOCYTES NFR BLD AUTO: 30 %
MCH RBC QN AUTO: 31.3 PG (ref 26.5–33)
MCHC RBC AUTO-ENTMCNC: 34 G/DL (ref 31.5–36.5)
MCV RBC AUTO: 92 FL (ref 78–100)
MONOCYTES # BLD AUTO: 0.7 10E3/UL (ref 0–1.3)
MONOCYTES NFR BLD AUTO: 12 %
NEUTROPHILS # BLD AUTO: 3.1 10E3/UL (ref 1.6–8.3)
NEUTROPHILS NFR BLD AUTO: 55 %
NRBC # BLD AUTO: 0 10E3/UL
NRBC BLD AUTO-RTO: 0 /100
PLATELET # BLD AUTO: 275 10E3/UL (ref 150–450)
POTASSIUM SERPL-SCNC: 3.7 MMOL/L (ref 3.4–5.3)
RBC # BLD AUTO: 3.8 10E6/UL (ref 3.8–5.2)
SODIUM SERPL-SCNC: 138 MMOL/L (ref 136–145)
TSH SERPL DL<=0.005 MIU/L-ACNC: 3.3 UIU/ML (ref 0.3–4.2)
WBC # BLD AUTO: 5.6 10E3/UL (ref 4–11)

## 2023-04-20 PROCEDURE — 90791 PSYCH DIAGNOSTIC EVALUATION: CPT

## 2023-04-20 PROCEDURE — 82077 ASSAY SPEC XCP UR&BREATH IA: CPT | Performed by: FAMILY MEDICINE

## 2023-04-20 PROCEDURE — 84443 ASSAY THYROID STIM HORMONE: CPT | Performed by: FAMILY MEDICINE

## 2023-04-20 PROCEDURE — 99284 EMERGENCY DEPT VISIT MOD MDM: CPT | Performed by: FAMILY MEDICINE

## 2023-04-20 PROCEDURE — 85004 AUTOMATED DIFF WBC COUNT: CPT | Performed by: FAMILY MEDICINE

## 2023-04-20 PROCEDURE — 36415 COLL VENOUS BLD VENIPUNCTURE: CPT | Performed by: FAMILY MEDICINE

## 2023-04-20 PROCEDURE — 82310 ASSAY OF CALCIUM: CPT | Performed by: FAMILY MEDICINE

## 2023-04-20 PROCEDURE — 99285 EMERGENCY DEPT VISIT HI MDM: CPT | Mod: 25 | Performed by: FAMILY MEDICINE

## 2023-04-20 ASSESSMENT — ACTIVITIES OF DAILY LIVING (ADL): ADLS_ACUITY_SCORE: 35

## 2023-04-21 LAB
ALBUMIN UR-MCNC: NEGATIVE MG/DL
AMPHETAMINES UR QL: DETECTED
APPEARANCE UR: ABNORMAL
BACTERIA #/AREA URNS HPF: ABNORMAL /HPF
BARBITURATES UR QL SCN: NOT DETECTED
BENZODIAZ UR QL SCN: NOT DETECTED
BILIRUB UR QL STRIP: NEGATIVE
BUPRENORPHINE UR QL: NOT DETECTED
CANNABINOIDS UR QL: DETECTED
COCAINE UR QL SCN: NOT DETECTED
COLOR UR AUTO: YELLOW
D-METHAMPHET UR QL: NOT DETECTED
GLUCOSE UR STRIP-MCNC: NEGATIVE MG/DL
HGB UR QL STRIP: ABNORMAL
KETONES UR STRIP-MCNC: NEGATIVE MG/DL
LEUKOCYTE ESTERASE UR QL STRIP: ABNORMAL
METHADONE UR QL SCN: NOT DETECTED
MUCOUS THREADS #/AREA URNS LPF: PRESENT /LPF
NITRATE UR QL: NEGATIVE
OPIATES UR QL SCN: NOT DETECTED
OXYCODONE UR QL SCN: NOT DETECTED
PCP UR QL SCN: NOT DETECTED
PH UR STRIP: 6 [PH] (ref 5–7)
PROPOXYPH UR QL: NOT DETECTED
RBC URINE: 1 /HPF
SP GR UR STRIP: 1.01 (ref 1–1.03)
SQUAMOUS EPITHELIAL: 5 /HPF
TRICYCLICS UR QL SCN: NOT DETECTED
UROBILINOGEN UR STRIP-MCNC: NORMAL MG/DL
WBC URINE: 19 /HPF

## 2023-04-21 PROCEDURE — 87186 SC STD MICRODIL/AGAR DIL: CPT | Performed by: FAMILY MEDICINE

## 2023-04-21 PROCEDURE — 81001 URINALYSIS AUTO W/SCOPE: CPT | Mod: XU | Performed by: FAMILY MEDICINE

## 2023-04-21 PROCEDURE — 80306 DRUG TEST PRSMV INSTRMNT: CPT | Performed by: FAMILY MEDICINE

## 2023-04-21 ASSESSMENT — ENCOUNTER SYMPTOMS
APPETITE CHANGE: 0
WEAKNESS: 0
SLEEP DISTURBANCE: 1
CHILLS: 0
DECREASED CONCENTRATION: 1
HEMATOLOGIC/LYMPHATIC NEGATIVE: 1
SEIZURES: 0
HALLUCINATIONS: 0
LIGHT-HEADEDNESS: 0
DIZZINESS: 0
NUMBNESS: 0
FEVER: 0
RESPIRATORY NEGATIVE: 1
MUSCULOSKELETAL NEGATIVE: 1
CONFUSION: 0
NERVOUS/ANXIOUS: 1
CARDIOVASCULAR NEGATIVE: 1
FACIAL ASYMMETRY: 0
HYPERACTIVE: 1
DYSPHORIC MOOD: 0
HEADACHES: 0
NEUROLOGICAL NEGATIVE: 1
GASTROINTESTINAL NEGATIVE: 1
EYES NEGATIVE: 1

## 2023-04-21 ASSESSMENT — COLUMBIA-SUICIDE SEVERITY RATING SCALE - C-SSRS
5. HAVE YOU STARTED TO WORK OUT OR WORKED OUT THE DETAILS OF HOW TO KILL YOURSELF? DO YOU INTEND TO CARRY OUT THIS PLAN?: NO
3. HAVE YOU BEEN THINKING ABOUT HOW YOU MIGHT KILL YOURSELF?: NO
1. HAVE YOU WISHED YOU WERE DEAD OR WISHED YOU COULD GO TO SLEEP AND NOT WAKE UP?: YES
REASONS FOR IDEATION LIFETIME: MOSTLY TO END OR STOP THE PAIN (YOU COULDN'T GO ON LIVING WITH THE PAIN OR HOW YOU WERE FEELING)
4. HAVE YOU HAD THESE THOUGHTS AND HAD SOME INTENTION OF ACTING ON THEM?: NO
1. IN THE PAST MONTH, HAVE YOU WISHED YOU WERE DEAD OR WISHED YOU COULD GO TO SLEEP AND NOT WAKE UP?: YES
TOTAL  NUMBER OF ABORTED OR SELF INTERRUPTED ATTEMPTS LIFETIME: NO
2. HAVE YOU ACTUALLY HAD ANY THOUGHTS OF KILLING YOURSELF?: YES
6. HAVE YOU EVER DONE ANYTHING, STARTED TO DO ANYTHING, OR PREPARED TO DO ANYTHING TO END YOUR LIFE?: NO
4. HAVE YOU HAD THESE THOUGHTS AND HAD SOME INTENTION OF ACTING ON THEM?: NO
5. HAVE YOU STARTED TO WORK OUT OR WORKED OUT THE DETAILS OF HOW TO KILL YOURSELF? DO YOU INTEND TO CARRY OUT THIS PLAN?: NO
REASONS FOR IDEATION PAST MONTH: MOSTLY TO END OR STOP THE PAIN (YOU COULDN'T GO ON LIVING WITH THE PAIN OR HOW YOU WERE FEELING)
TOTAL  NUMBER OF INTERRUPTED ATTEMPTS LIFETIME: NO
ATTEMPT LIFETIME: NO
2. HAVE YOU ACTUALLY HAD ANY THOUGHTS OF KILLING YOURSELF?: YES

## 2023-04-21 NOTE — ED NOTES
Patient is going to be sleeping in the ED until her spouse wakes up around 0400. She was unable to get a hold of him.

## 2023-04-21 NOTE — ED TRIAGE NOTES
Pt states her brain is overworking and has been feeling some thoughts of SI but no plan, has been feeling depressed. Taking medication for mental health - denies any psych or therapy currently. RN asked about drug use, pt said 'a little'. Pt states she has never attempted to end her life.      Triage Assessment     Row Name 04/20/23 9031       Triage Assessment (Adult)    Airway WDL WDL       Respiratory WDL    Respiratory WDL WDL       Cardiac WDL    Cardiac WDL WDL

## 2023-04-21 NOTE — ED PROVIDER NOTES
History     Chief Complaint   Patient presents with     Mental Health Problem     HPI  Marjorie Holbrook is a 47 year old female who presented to the emergency room with her significant other secondary to concerns of mental health issues.  She states that she has had history for depression and anxiety in the past.  She states that she has had 2 anxiety attacks most recently about 2 weeks ago in the last month or so.  She also states that she is having increase feelings of her brain just being unable to her rest or sleep.  She states it feels like her brain is just working overtime.  She states that she has had some occasional thoughts of self-harm but currently has no plan and does not feel like she would harm herself this time of the exam.  She has been taking her med health medications as directed without change over the last several years.  Patient admits to occasional use of marijuana to her cope with her brain being unable to slow down.  She denies any recent illness.  She denies any recent trauma or injury.    Allergies:  Allergies   Allergen Reactions     Metronidazole Nausea and Vomiting       Problem List:    Patient Active Problem List    Diagnosis Date Noted     Vitamin D deficiency 11/20/2022     Priority: Medium     Atypical squamous cells of undetermined significance on cytologic smear of cervix (ASC-US) 12/07/2020     Priority: Medium     Borderline personality disorder (H) 06/08/2020     Priority: Medium     Moderate episode of recurrent major depressive disorder (H) 11/11/2019     Priority: Medium     Insomnia, unspecified type 11/11/2019     Priority: Medium     H/O hysterectomy for benign disease 06/20/2018     Priority: Medium     Attention deficit hyperactivity disorder (ADHD), predominantly inattentive type 11/09/2016     Priority: Medium     Tobacco use disorder 04/30/2014     Priority: Medium        Past Medical History:    Past Medical History:   Diagnosis Date     Arthritis      ASCUS  favoring benign 10/9/14     Asthma      Pyelonephritis 10/2011     Recurrent major depression in partial remission (H) 2/17/2010       Past Surgical History:    Past Surgical History:   Procedure Laterality Date     ABDOMEN SURGERY      recent surgery see records     BIOPSY      recent see records     COLONOSCOPY N/A 10/27/2014    Procedure: COLONOSCOPY;  Surgeon: Rashawn Mayer MD;  Location: [facility]     CYSTOSCOPY N/A 04/07/2015    Procedure: CYSTOSCOPY;  Surgeon: Rk العلي MD;  Location: [facility]     DILATION AND CURETTAGE, HYSTEROSCOPY, ABLATE ENDOMETRIUM NOVASURE, COMBINED  09/20/2013    Procedure: COMBINED DILATION AND CURETTAGE, HYSTEROSCOPY, ABLATE ENDOMETRIUM NOVASURE;  Hysteroscopy, Novasure Endometrial Ablation, Currettings;  Surgeon: Michaelle Duran MD;  Location: [facility]     LAPAROSCOPIC HYSTERECTOMY TOTAL N/A 04/07/2015    Procedure: LAPAROSCOPIC HYSTERECTOMY TOTAL;  Surgeon: Rk العلي MD;  Location: [facility]     LAPAROSCOPIC HYSTERECTOMY TOTAL       LAPAROSCOPIC HYSTERECTOMY TOTAL       LAPAROSCOPY DIAGNOSTIC (GYN) N/A 12/01/2014    Procedure: LAPAROSCOPY DIAGNOSTIC (GYN);  Surgeon: Rk العلي MD;  Location: [facility]     SALPINGECTOMY Bilateral 04/07/2015    Procedure: SALPINGECTOMY;  Surgeon: Rk العلي MD;  Location: [facility]     TONSILLECTOMY  1996     TUBAL LIGATION  2000       Family History:    Family History   Problem Relation Age of Onset     Thyroid Disease Mother      Alcohol/Drug Father      Respiratory Father         COPD     Depression Father      Heart Disease Father         A-Fib     Hypertension Maternal Grandmother      Cerebrovascular Disease Maternal Grandfather      Unknown/Adopted Paternal Grandmother      Unknown/Adopted Paternal Grandfather      No Known Problems Brother      No Known Problems Sister      No Known Problems Brother      Family History Negative Child        Social History:  Marital Status:    "[2]  Social History     Tobacco Use     Smoking status: Some Days     Packs/day: 0.50     Years: 9.00     Pack years: 4.50     Types: Cigarettes     Start date: 2008     Last attempt to quit: 6/15/2018     Years since quittin.8     Smokeless tobacco: Never     Tobacco comments:     1-2 per day, started age 32   Vaping Use     Vaping status: Never Used     Passive vaping exposure: Yes   Substance Use Topics     Alcohol use: Yes     Alcohol/week: 0.0 standard drinks of alcohol     Comment: occ.     Drug use: Yes     Comment: \"a little\"        Medications:    amphetamine-dextroamphetamine (ADDERALL) 15 MG tablet  amphetamine-dextroamphetamine (ADDERALL) 30 MG tablet  lamoTRIgine (LAMICTAL) 200 MG tablet  methylphenidate (RITALIN) 20 MG tablet  QUEtiapine (SEROQUEL) 25 MG tablet  venlafaxine (EFFEXOR XR) 150 MG 24 hr capsule  venlafaxine (EFFEXOR XR) 75 MG 24 hr capsule          Review of Systems   Constitutional: Negative for appetite change, chills and fever.   HENT: Negative.    Eyes: Negative.    Respiratory: Negative.    Cardiovascular: Negative.    Gastrointestinal: Negative.    Genitourinary: Negative.    Musculoskeletal: Negative.    Skin: Negative.    Neurological: Negative.  Negative for dizziness, seizures, syncope, facial asymmetry, weakness, light-headedness, numbness and headaches.   Hematological: Negative.    Psychiatric/Behavioral: Positive for behavioral problems, decreased concentration, sleep disturbance and suicidal ideas. Negative for confusion, dysphoric mood and hallucinations. The patient is nervous/anxious and is hyperactive.    All other systems reviewed and are negative.      Physical Exam   BP: (!) 141/72  Pulse: 110  Temp: 98  F (36.7  C)  Resp: 18  SpO2: 99 %      Physical Exam  Vitals and nursing note reviewed. Exam conducted with a chaperone present ().   Constitutional:       General: She is in acute distress.      Appearance: She is not toxic-appearing or diaphoretic. "   HENT:      Head: Normocephalic and atraumatic.      Nose: Congestion present.   Eyes:      Extraocular Movements: Extraocular movements intact.      Conjunctiva/sclera: Conjunctivae normal.      Pupils: Pupils are equal, round, and reactive to light.   Cardiovascular:      Rate and Rhythm: Tachycardia present.      Pulses: Normal pulses.      Heart sounds: No murmur heard.  Pulmonary:      Effort: Pulmonary effort is normal. No respiratory distress.      Breath sounds: No wheezing, rhonchi or rales.   Abdominal:      Tenderness: There is no abdominal tenderness. There is no guarding.   Musculoskeletal:      Cervical back: Normal range of motion and neck supple.   Skin:     Capillary Refill: Capillary refill takes less than 2 seconds.      Findings: No bruising or rash.   Neurological:      Mental Status: She is alert and oriented to person, place, and time.   Psychiatric:         Attention and Perception: Attention normal. She does not perceive auditory or visual hallucinations.         Mood and Affect: Mood is anxious. Affect is tearful.         Speech: She is noncommunicative. Speech is rapid and pressured. Speech is not delayed, slurred or tangential.         Behavior: Behavior is cooperative.         Thought Content: Thought content is not paranoid. Thought content does not include homicidal or suicidal ideation. Thought content does not include homicidal or suicidal plan.         Cognition and Memory: Cognition normal.         ED Course     Mental Health Risk Assessment      PSS-3    Date and Time Over the past 2 weeks have you felt down, depressed, or hopeless? Over the past 2 weeks have you had thoughts of killing yourself? Have you ever attempted to kill yourself? When did this last happen? User   04/20/23 3723 yes yes no -- [facility]      C-SSRS ([place])    Date and Time Q1 Wished to be Dead (Past Month) Q2 Suicidal Thoughts (Past Month) Q3 Suicidal Thought Method Q4 Suicidal Intent without Specific Plan Q5  Suicide Intent with Specific Plan Q6 Suicide Behavior (Lifetime) Within the Past 3 Months? RETIRED: Level of Risk per Screen Screening Not Complete User   04/20/23 2200 no no no no no no -- -- -- [facility]              Suicide assessment completed by mental health (D.E.C., LCSW, etc.)       Procedures              Critical Care time:  none               Results for orders placed or performed during the hospital encounter of 04/20/23 (from the past 24 hour(s))   CBC with platelets differential    Narrative    The following orders were created for panel order CBC with platelets differential.  Procedure                               Abnormality         Status                     ---------                               -----------         ------                     CBC with platelets and d...[[id number]]                      Final result                 Please view results for these tests on the individual orders.   Basic metabolic panel   Result Value Ref Range    Sodium 138 136 - 145 mmol/L    Potassium 3.7 3.4 - 5.3 mmol/L    Chloride 103 98 - 107 mmol/L    Carbon Dioxide (CO2) 23 22 - 29 mmol/L    Anion Gap 12 7 - 15 mmol/L    Urea Nitrogen 15.1 6.0 - 20.0 mg/dL    Creatinine 0.87 0.51 - 0.95 mg/dL    Calcium 9.3 8.6 - 10.0 mg/dL    Glucose 115 (H) 70 - 99 mg/dL    GFR Estimate 81 >60 mL/min/1.73m2   Ethyl Alcohol Level   Result Value Ref Range    Alcohol ethyl <0.01 <=0.01 g/dL   TSH with free T4 reflex   Result Value Ref Range    TSH 3.30 0.30 - 4.20 uIU/mL   CBC with platelets and differential   Result Value Ref Range    WBC Count 5.6 4.0 - 11.0 10e3/uL    RBC Count 3.80 3.80 - 5.20 10e6/uL    Hemoglobin 11.9 11.7 - 15.7 g/dL    Hematocrit 35.0 35.0 - 47.0 %    MCV 92 78 - 100 fL    MCH 31.3 26.5 - 33.0 pg    MCHC 34.0 31.5 - 36.5 g/dL    RDW 11.9 10.0 - 15.0 %    Platelet Count 275 150 - 450 10e3/uL    % Neutrophils 55 %    % Lymphocytes 30 %    % Monocytes 12 %    % Eosinophils 2 %    % Basophils 1 %    % Immature  Granulocytes 0 %    NRBCs per 100 WBC 0 <1 /100    Absolute Neutrophils 3.1 1.6 - 8.3 10e3/uL    Absolute Lymphocytes 1.7 0.8 - 5.3 10e3/uL    Absolute Monocytes 0.7 0.0 - 1.3 10e3/uL    Absolute Eosinophils 0.1 0.0 - 0.7 10e3/uL    Absolute Basophils 0.1 0.0 - 0.2 10e3/uL    Absolute Immature Granulocytes 0.0 <=0.4 10e3/uL    Absolute NRBCs 0.0 10e3/uL   UA with Microscopic reflex to Culture    Specimen: Urine, Clean Catch   Result Value Ref Range    Color Urine Yellow Colorless, Straw, Light Yellow, Yellow    Appearance Urine Slightly Cloudy (A) Clear    Glucose Urine Negative Negative mg/dL    Bilirubin Urine Negative Negative    Ketones Urine Negative Negative mg/dL    Specific Gravity Urine 1.012 1.003 - 1.035    Blood Urine Small (A) Negative    pH Urine 6.0 5.0 - 7.0    Protein Albumin Urine Negative Negative mg/dL    Urobilinogen Urine Normal Normal, 2.0 mg/dL    Nitrite Urine Negative Negative    Leukocyte Esterase Urine Small (A) Negative    Bacteria Urine Moderate (A) None Seen /HPF    Mucus Urine Present (A) None Seen /LPF    RBC Urine 1 <=2 /HPF    WBC Urine 19 (H) <=5 /HPF    Squamous Epithelials Urine 5 (H) <=1 /HPF    Narrative    Urine Culture ordered based on laboratory criteria   Urine Drugs of Abuse Screen    Narrative    The following orders were created for panel order Urine Drugs of Abuse Screen.  Procedure                               Abnormality         Status                     ---------                               -----------         ------                     Urine Drugs of Abuse Scr...[[id number]]  Abnormal            Final result                 Please view results for these tests on the individual orders.   Urine Drugs of Abuse Screen Panel 13   Result Value Ref Range    Cannabinoids (83-gni-7-carboxy-9-THC) Detected (A) Not Detected, Indeterminate    Phencyclidine Not Detected Not Detected, Indeterminate    Cocaine (Benzoylecgonine) Not Detected Not Detected, Indeterminate     Methamphetamine (d-Methamphetamine) Not Detected Not Detected, Indeterminate    Opiates (Morphine) Not Detected Not Detected, Indeterminate    Amphetamine (d-Amphetamine) Detected (A) Not Detected, Indeterminate    Benzodiazepines (Nordiazepam) Not Detected Not Detected, Indeterminate    Tricyclic Antidepressants (Desipramine) Not Detected Not Detected, Indeterminate    Methadone Not Detected Not Detected, Indeterminate    Barbiturates (Butalbital) Not Detected Not Detected, Indeterminate    Oxycodone Not Detected Not Detected, Indeterminate    Propoxyphene (Norpropoxyphene) Not Detected Not Detected, Indeterminate    Buprenorphine Not Detected Not Detected, Indeterminate       Assessments & Plan (with Medical Decision Making)  47-year-old female comes into the ER secondary to concerns of her mind racing and inability to sleep.  Symptoms have been present on and on for the last month but worse over the last week or so.  Patient agreed to allow for physical exam and evaluation.  She also was evaluated by the DEC service.  Patient felt a lot of her anxiety was over the overwhelming workload she has been undertaking in the last week.  Patient was evaluated by the DEC service and recommendation made for outpatient treatment and counseling services.  They will work her in to an outpatient clinic in the next few days.  Patient felt good about this plan of care.  Patient was able to contract for safety.  She was discharged to care of her .     I have reviewed the nursing notes.    I have reviewed the findings, diagnosis, plan and need for follow up with the patient.     Medical Decision Making  The patient's presentation was of moderate complexity (a chronic illness mild to moderate exacerbation, progression, or side effect of treatment).    The patient's evaluation involved:  ordering and/or review of 3+ test(s) in this encounter (see separate area of note for details)    The patient's management necessitated  moderate risk (limitations due to social determinants of health (see separate area of note for details)).          Final diagnoses:   Attention deficit hyperactivity disorder (ADHD), predominantly inattentive type   Borderline personality disorder (H)   Moderate episode of recurrent major depressive disorder (H)       4/20/2023   [facility] EMERGENCY DEPT     Cedric Melo,   04/21/23 8108

## 2023-04-21 NOTE — CONSULTS
"Diagnostic Evaluation Consultation  Crisis Assessment    Patient was assessed: Rosie  Patient location: [facility]   Was a release of information signed: No. Reason: no current outside providers.       Referral Data and Chief Complaint  Marjorie is a 47 year old, who uses she/her pronouns, and presents to the ED with family/friends. Patient is referred to the ED by self. Patient is presenting to the ED for the following concerns: I have not been feeling normal and have some suicidal thoughts. .      Informed Consent and Assessment Methods     Patient is her own guardian. Writer met with patient and explained the crisis assessment process, including applicable information disclosures and limits to confidentiality, assessed understanding of the process, and obtained consent to proceed with the assessment. Patient was observed to be able to participate in the assessment as evidenced by verbal consent. Assessment methods included conducting a formal interview with patient, review of medical records, collaboration with medical staff, and obtaining relevant collateral information from family and community providers when available..     Over the course of this crisis assessment provided reassurance, offered validation, engaged patient in problem solving and disposition planning, worked with patient on safety and aftercare planning and provided psychoeducation. Patient's response to interventions was positive.      Summary of Patient Situation     Patient presents to the hospital with her  due to concerns for suicidal ideation and depressed mood she has been experiencing for several weeks.  Patient states she normally receives medications from her primary care provider to deal with concerns of ADHD, depression, and mood stability.  Patient shares she has not been feeling \"normal\" for a period of time now.  She reports she felt she needed to come to the hospital to get further assistance.  Patient has been attempting to " cope utilizing cannabis, the support of her , and coping skills she has already.    Brief Psychosocial History     Patient reports she resides with her , whom she states is her second .  She shares he is very well supportive and able to help her with her mental health concerns.  Patient shares she did not know her father, but has knowledge that he has a history of major depressive disorder, and alcoholism.  She reports both sides of the family have major depressive disorder concerns, alcoholism, anxiety concerns, and suicide in her family history.  Patient did not disclose whether she is employed or not at this time.  She disclosed her  as her main support, she likes to do many activities with.  She has not disclosed any legal issues at this time.  Patient did not identify any cultural or Confucianism or spiritual influences at this time.    Significant Clinical History     Patient reports she has a history of major depressive disorder dating back to childhood.  She reports she has not used cannabis for the majority of her adult life to deal with anxiety and sleep concerns.  Patient is currently being treated for ADHD, depressive symptoms, and mood stability.  Patient is currently supported by her primary care physician at [facility].  Patient reports she has a previous trauma history but did not disclose details regarding her history.  Patient shares she has never been hospitalized prior to this point.        Risk Assessment  [place] Suicide Severity Rating Scale Full Clinical Version: 4/21/23  Suicidal Ideation  1. Wish to be Dead (Lifetime): Yes  1. Wish to be Dead (Past 1 Month): Yes  2. Non-Specific Active Suicidal Thoughts (Lifetime): Yes  2. Non-Specific Active Suicidal Thoughts (Past 1 Month): Yes  3. Active Suicidal Ideation with any Methods (Not Plan) Without Intent to Act (Lifetime): No  3. Active Suicidal Ideation with any Methods (Not Plan) Without Intent to Act  (Past 1 Month): Yes  4. Active Suicidal Ideation with Some Intent to Act, Without Specific Plan (Lifetime): No  4. Active Suicidal Ideation with Some Intent to Act, Without Specific Plan (Past 1 Month): No  5. Active Suicidal Ideation with Specific Plan and Intent (Lifetime): No  5. Active Suicidal Ideation with Specific Plan and Intent (Past 1 Month): No  Intensity of Ideation  Most Severe Ideation Rating (Lifetime): 3  Description of Most Severe Ideation (Lifetime): 3  Most Severe Ideation Rating (Past 1 Month): 3  Description of Most Severe Ideation (Past 1 Month): 3  Frequency (Lifetime): Less than once a week  Frequency (Past 1 Month): Once a week  Duration (Lifetime): Fleeting, few seconds or minutes  Duration (Past 1 Month): 1-4 hours/a lot of time  Controllability (Lifetime): Easily able to control thoughts  Controllability (Past 1 Month): Can control thoughts with some difficulty  Deterrents (Lifetime): Deterrents definitely stopped you from attempting suicide  Deterrents (Past 1 Month): Deterrents definitely stopped you from attempting suicide  Reasons for Ideation (Lifetime): Mostly to end or stop the pain (You couldn't go on living with the pain or how you were feeling)  Reasons for Ideation (Past 1 Month): Mostly to end or stop the pain (You couldn't go on living with the pain or how you were feeling)  Suicidal Behavior  Actual Attempt (Lifetime): No  Has subject engaged in non-suicidal self-injurious behavior? (Lifetime): Yes  Has subject engaged in non-suicidal self-injurious behavior? (Past 3 Months): Yes  Interrupted Attempts (Lifetime): No  Aborted or Self-Interrupted Attempt (Lifetime): No  Preparatory Acts or Behavior (Lifetime): No  C-SSRS Risk (Lifetime/Recent)  Calculated C-SSRS Risk Score (Lifetime/Recent): Moderate Risk          Validity of evaluation is not impacted by presenting factors during interview evidenced by patient's willingness to speak about suicidal ideation, ability to safety  plan, and the ability to rely upon supports. .   Comments regarding subjective versus objective responses to [place] tool: Patient's subjective responses mostly match the data within the [place] tool evidenced by a lifetime difficulty of controlling suicidal ideation, and recent depressed mood leading to hospitalization.   Environmental or Psychosocial Events: challenging interpersonal relationships and helplessness/hopelessness  Chronic Risk Factors: parental substance abuse issue   Warning Signs: seeking access to means to hurt or kill self, talking or writing about death, dying, or suicide, hopelessness, acting reckless or engaging in risky activities, increasing substance use or abuse and dramatic changes in mood  Protective Factors: strong bond to family unit, community support, or employment, responsibilities and duties to others, including pets and children, lives in a responsibly safe and stable environment, sense of importance of health and wellness, help seeking and good problem-solving, coping, and conflict resolution skills  Interpretation of Risk Scoring, Risk Mitigation Interventions and Safety Plan:  Patient appears to be at moderate risk based on her most recent depressed mood and increase in suicidal ideation. However, it appears her crisis has largely been resolved and can be managed in the outpatient setting. This is evidenced by her willingness to safety plan with this writer, rely upon supports within her life, engage in mental health treatment, and adhere to her safety plan.      Does the patient have thoughts of harming others? No     Is the patient engaging in sexually inappropriate behavior?  no        Current Substance Abuse     Is there recent substance abuse? Yes, patient utilizes cannabis daily to address sleep concerns.      Was a urine drug screen or blood alcohol level obtained: No       Mental Status Exam     Affect: Appropriate   Appearance: Appropriate    Attention  "Span/Concentration: Attentive  Eye Contact: Engaged   Fund of Knowledge: Appropriate    Language /Speech Content: Fluent   Language /Speech Volume: Normal    Language /Speech Rate/Productions: Articulate and Normal    Recent Memory: Intact   Remote Memory: Intact   Mood: Normal    Orientation to Person: Yes    Orientation to Place: Yes   Orientation to Time of Day: Yes    Orientation to Date: Yes    Situation (Do they understand why they are here?): Yes    Psychomotor Behavior: Normal    Thought Content: Clear   Thought Form: Goal Directed      History of commitment: No       Medication    Psychotropic medications:   No current facility-administered medications for this encounter.     Current Outpatient Medications   Medication     amphetamine-dextroamphetamine (ADDERALL) 15 MG tablet     amphetamine-dextroamphetamine (ADDERALL) 30 MG tablet     lamoTRIgine (LAMICTAL) 200 MG tablet     methylphenidate (RITALIN) 20 MG tablet     QUEtiapine (SEROQUEL) 25 MG tablet     venlafaxine (EFFEXOR XR) 150 MG 24 hr capsule     venlafaxine (EFFEXOR XR) 75 MG 24 hr capsule       Medication changes made in the last two weeks: No       Current Care Team    Primary Care Provider: Dr. Toribio, [facility]  Psychiatrist: No  Therapist: No  : No     CTSS or ARMHS: No  ACT Team: No  Other: No      Diagnosis    Unspecified depressive disorder - (F32.9)    Clinical Summary and Substantiation of Recommendations    Patient initially presented to the hospital with her  due to depressive symptoms concerns, in which she shares she is not feeling \"normal\".  Patient shares she has been struggling with mood stability over the past several weeks leading up to hospitalization.  She shares her symptoms have been \"building up\".  Patient appears to be well supported by her  and primary care physician, however would benefit from outpatient therapy and psychiatry for further medication management.  Patient shares while " she had suicidal ideation initially when she presented to the hospital, she shares it has largely resolved.  Patient did also report urges to self-harm, however after further conversation with this writer identified thoughts and urges have largely resolved as well.  She reports she feels safe if she were to return home.  It was determined by patient, this writer, and her emergency department provider that the patient is appropriate to discharge home when family can come to pick her up from the hospital.  Patient is in agreement with the stated plan of care and is looking forward to more outpatient support.  Disposition    Recommended disposition: Individual Therapy and Medication Management       Reviewed case and recommendations with attending provider. Attending Name: Dr. Melo, DO       Attending concurs with disposition: Yes       Patient and/or validated legal guardian concurs with disposition: Yes       Final disposition: Individual therapy  and Medication management.     Outpatient Details (if applicable):   Aftercare plan and appointments placed in the AVS and provided to patient: Yes. Given to patient by bedside nurse.     Was lethal means counseling provided as a part of aftercare planning? Yes - describe patient will work with  to remove lethal means;       Assessment Details    Patient interview started at: 11:30pm and completed at: 12:15am.     Total duration spent on the patient case in minutes: 1.0 hrs      CPT code(s) utilized: 87734 - Psychotherapy for Crisis - 60 (30-74*) min       SHAGGY Page, DAE, SHAGGY, Psychotherapist  [facility] - Triage & Transition Services  Callback: [phone]    Contact my established care providers   Call the National Suicide Prevention Lifeline: 590.899.1711   Go to the nearest emergency room   Call 141     Warning signs that I or other people might notice when a crisis is developing for me:     I am having increasing suicidal thoughts that turn to  plans with intent or means  I am having additional urges to self-harm    My emotions are of hopelessness; feeling like there's no way out.  Rage or anger.  Engaging in risky activities without thinking  Withdrawing from family/friends  Dramatic mood swings  Drastic personality changes   Use of alcohol or drugs  Postings on social media  Neglect of personal hygiene or cares      Things I am able to do on my own to cope or help me feel better:    Other things to Try:  Spending quality time with loved ones  Staying hydrated  Eating balanced meals  Going for a walk every day  Take care of daily responsibilities/needs  Focus on positive self-talk vs negative self-talk     Things that I am able to do with others to cope or help me better:   Other things to Try:  Exercise  Music  Deep breathing  Meditations  Journal  Self-regulate  Self check-in  Ask for help     Things I can use or do for distraction:   Other things to Try:  Reach out to/spend time with family, friends  Shower  Exercise  Chores or do a project  Listen to music  Watch movie/TV  Listening to music  Journaling  Reading a book  Meditating  Call a friend     Changes I can make to support my mental health and wellness:    -I will abstain from all mood altering chemicals not currently prescribed to me   -I will attend scheduled mental health therapy and psychiatric appointments and follow all   recommendations  -I will commit to 30 minutes of self care daily - this can be as simple as taking a shower, going for a   walk, cooking a meal, read, writing, etc  -I will practice square breathing when I begin to feel anxious - in breath through the nose for the count   of 4 and the first line on the square. Out breath through the mouth for the count of 4 for the second line   of the square. Repeat to complete the square. Repeat the square as many times as needed.  - I will use distraction skills of: going for walks, watching TV, spending time outside, calling a friend  "or   family member  -Use community resources, including hotline numbers, [facility] crisis and support meetings  -Maintain a daily schedule/routine  -Practice deep breathing skills  -Download a meditation lavonne and spend 15-20 minutes per day mediating/relaxing. Some apps to   download include: Calm, Headspace and Insight Timer. All 3 of these apps have free version     People in my life that I can ask for help:   Family  Friends      Your [facility] has a mental health crisis team you can call 24/7: [facility] Mobile Crisis Response Team (CRT) [phone] or [phone]          Crisis Lines  Crisis Text Line  Text 023950  You will be connected with a trained live crisis counselor to provide support.    Por espanol, texto  FRANCIS a 547038 o texto a 442-AYUDAME en WhatsApp    The [employer] (LGBTQ Youth Crisis Line)  3.793.734.7483  text START to 978-056      Community [place]  Fast Tracker  Linking people to mental health and substance use disorder resources  Frankly Chat     [place] [facility] Warm Line  Peer to peer support  Monday thru Saturday, 12 pm to 10 pm  894.973.8090 or 1.498.973.4273  Text \"Support\" to 44775    National [place] on Mental Illness (TAYLOR)  184.267.9384 or 1.888.TAYLOR.HELPS      Mental Health Apps  My3  https://myEstechpp.org/    VirtualHopeBox  https://IDEAglobal.org/apps/virtual-hope-box/      Crisis Lines  Crisis Text Line  Text 807089  You will be connected with a trained live crisis counselor to provide support.    Por espanol, texto  FRANCIS a 972817 o texto a 442-AYUDAME en WhatsApp    National Hope Line  1.800.SUICIDE [3978262]      Community Resources  Fast Tracker  Linking people to mental health and substance use disorder resources  Frankly Chat     [place] [facility] Warm Line  Peer to peer support  Monday thru Saturday, 12 pm to 10 pm  623.562.0532 or 0.145.167.8037  Text \"Support\" to 45492    National [place] on Mental " Illness (TALYOR)  252.055.4279 or 1.888.TAYLOR.HELPS      Mental Health Apps  My3  https://myLykspp.org/    VirtualHopeBox  https://Meebo/apps/virtual-hope-box/      Additional Information  Today you were seen by a licensed mental health professional through Triage and Transition services, Behavioral Healthcare Providers ([facility])  for a crisis assessment in the Emergency Department at [facility].  It is recommended that you follow up with your established providers (psychiatrist, mental health therapist, and/or primary care doctor - as relevant) as soon as possible. Coordinators from [facility] will be calling you in the next 24-48 hours to ensure that you have the resources you need.  You can also contact [facility] coordinators directly at [phone]. You may have been scheduled for or offered an appointment with a mental health provider. [facility] maintains an extensive network of licensed behavioral health providers to connect patients with the services they need.  We do not charge providers a fee to participate in our referral network.  We match patients with providers based on a patient's specific needs, insurance coverage, and location.  Our first effort will be to refer you to a provider within your care system, and will utilize providers outside your care system as needed.        Things I can use or do for distraction: Reduce Extreme Emotion  QUICKLY:  Changing Your Body Chemistry      T:  Change your body Temperature to change your autonomic nervous system     Use Ice Water to calm yourself down FAST     Put your face in a bowl of ice water (this is the best way; have the person keep his/her face in ice water for 30-45 seconds - initial research is showing that the longer s/he can hold her/his face in the water, the better the response), or     Splash ice water on your face, or hold an ice pack on your face      I:  Intensely exercise to calm down a body revved up by emotion     Examples: running, walking fast,  jumping, playing basketball, weight lifting, swimming, calisthenics, etc.     Engage in exercises that DO NOT include violent behaviors. Exercises that utilize violent behaviors tend to function as  behavioral rehearsal,  and rather than calming the person down, may actually  rev  the person up more, increasing the likelihood of violence, and lessening the likelihood that they will  burn off  energy     P:  Progressively relax your muscles     Starting with your hands, moving to your forearms, upper arms, shoulders, neck, forehead, eyes, cheeks and lips, tongue and teeth, chest, upper back, stomach, buttocks, thighs, calves, ankles, feet     Tense (10 seconds,   of the way), then relax each muscle (all the way)     Notice the tension     Notice the difference when relaxed (by tensing first, and then relaxing, you are able to get a more thorough relaxation than by simply relaxing)      P: Paced breathing to relax     The standard technique is to begin with counting the number of steps one takes for a typical inhale, then counting the steps one takes for a typical exhale, and then lengthening the amount of steps for the exhalation by one or two steps.  OR    Repeat this pattern for 1-2 minutes    Inhale for four (4) seconds     Exhale for six (6) to eight (8) seconds     Research demonstrated that one can change one's overall level of anxiety by doing this exercise for even a few minutes per day       You will be called to schedule an appointment with a mental health specialist.  If for some reason you do not hear from them within the next 24 hours please return to the ER for recheck.

## 2023-04-21 NOTE — DISCHARGE INSTRUCTIONS
Aftercare Plan  If I am feeling unsafe or I am in a crisis, I will:   Contact my established care providers   Call the National Suicide Prevention Lifeline: 798.815.6801   Go to the nearest emergency room   Call 911     Warning signs that I or other people might notice when a crisis is developing for me:     I am having increasing suicidal thoughts that turn to plans with intent or means  I am having additional urges to self-harm    My emotions are of hopelessness; feeling like there's no way out.  Rage or anger.  Engaging in risky activities without thinking  Withdrawing from family/friends  Dramatic mood swings  Drastic personality changes   Use of alcohol or drugs  Postings on social media  Neglect of personal hygiene or cares      Things I am able to do on my own to cope or help me feel better:    Other things to Try:  Spending quality time with loved ones  Staying hydrated  Eating balanced meals  Going for a walk every day  Take care of daily responsibilities/needs  Focus on positive self-talk vs negative self-talk     Things that I am able to do with others to cope or help me better:   Other things to Try:  Exercise  Music  Deep breathing  Meditations  Journal  Self-regulate  Self check-in  Ask for help     Things I can use or do for distraction:   Other things to Try:  Reach out to/spend time with family, friends  Shower  Exercise  Chores or do a project  Listen to music  Watch movie/TV  Listening to music  Journaling  Reading a book  Meditating  Call a friend     Changes I can make to support my mental health and wellness:    -I will abstain from all mood altering chemicals not currently prescribed to me   -I will attend scheduled mental health therapy and psychiatric appointments and follow all   recommendations  -I will commit to 30 minutes of self care daily - this can be as simple as taking a shower, going for a   walk, cooking a meal, read, writing, etc  -I will practice square breathing when I begin to  "feel anxious - in breath through the nose for the count   of 4 and the first line on the square. Out breath through the mouth for the count of 4 for the second line   of the square. Repeat to complete the square. Repeat the square as many times as needed.  - I will use distraction skills of: going for walks, watching TV, spending time outside, calling a friend or   family member  -Use community resources, including hotline numbers, [facility] crisis and support meetings  -Maintain a daily schedule/routine  -Practice deep breathing skills  -Download a meditation lavonne and spend 15-20 minutes per day mediating/relaxing. Some apps to   download include: Calm, Headspace and Insight Timer. All 3 of these apps have free version     People in my life that I can ask for help:   Family  Friends      Your [facility] has a mental health crisis team you can call 24/7: [facility] Mobile Crisis Response Team (CRT) [phone] or [phone]          Crisis Lines  Crisis Text Line  Text 902377  You will be connected with a trained live crisis counselor to provide support.    jacky Blankenshipo  FRANCIS a 918855 o texto a 442-AYUDAME en WhatsApp    The [employer]it Project (LGBTQ Youth Crisis Line)  0.229.470.2626  text START to 750-532      [employer]  Fast Tracker  Linking people to mental health and substance use disorder resources  [employer] Bagsn.org     [place] Mental Health Warm Line  Peer to peer support  Monday thru Saturday, 12 pm to 10 pm  963.945.0181 or 4.504.059.7641  Text \"Support\" to 82685    National [place] on Mental Illness (TAYLOR)  158.810.2445 or 1.888.TAYLOR.HELPS      Mental Health Apps  My3  https://myInform Genomicspp.org/    VirtualHopeBox  https://Join [employer] Company.org/apps/virtual-hope-box/      Crisis Lines  Crisis Text Line  Text 127057  You will be connected with a trained live crisis counselor to provide support.    Hernan larsen texto  FRANCIS a 538171 o texto a 442-AYUDAME en " "WhatsAlizzie    National Hope Line  1.800.SUICIDE [3199709]      Community Resources  Fast Tracker  Linking people to mental health and substance use disorder resources  TopChalksn.org     [place] Mental Health Warm Line  Peer to peer support  Monday thru Saturday, 12 pm to 10 pm  513.616.1370 or 5.520.610.0410  Text \"Support\" to 42595    National [place] on Mental Illness (TAYLOR)  960.555.8867 or 1.888.TAYLOR.HELPS      Mental Health Apps  My3  https://Invreppp.org/    VirtualHopeBox  https://[employer]/apps/virtual-hope-box/      Additional Information  Today you were seen by a licensed mental health professional through Triage and Transition services, Behavioral Healthcare Providers ([facility])  for a crisis assessment in the Emergency Department at [facility].  It is recommended that you follow up with your established providers (psychiatrist, mental health therapist, and/or primary care doctor - as relevant) as soon as possible. Coordinators from [facility] will be calling you in the next 24-48 hours to ensure that you have the resources you need.  You can also contact [facility] coordinators directly at [phone]. You may have been scheduled for or offered an appointment with a mental health provider. [facility] maintains an extensive network of licensed behavioral health providers to connect patients with the services they need.  We do not charge providers a fee to participate in our referral network.  We match patients with providers based on a patient's specific needs, insurance coverage, and location.  Our first effort will be to refer you to a provider within your care system, and will utilize providers outside your care system as needed.        Things I can use or do for distraction: Reduce Extreme Emotion  QUICKLY:  Changing Your Body Chemistry      T:  Change your body Temperature to change your autonomic nervous system   Use Ice Water to calm yourself down FAST   Put your face in a bowl of ice water (this " is the best way; have the person keep his/her face in ice water for 30-45 seconds - initial research is showing that the longer s/he can hold her/his face in the water, the better the response), or   Splash ice water on your face, or hold an ice pack on your face      I:  Intensely exercise to calm down a body revved up by emotion   Examples: running, walking fast, jumping, playing basketball, weight lifting, swimming, calisthenics, etc.   Engage in exercises that DO NOT include violent behaviors. Exercises that utilize violent behaviors tend to function as  behavioral rehearsal,  and rather than calming the person down, may actually  rev  the person up more, increasing the likelihood of violence, and lessening the likelihood that they will  burn off  energy     P:  Progressively relax your muscles   Starting with your hands, moving to your forearms, upper arms, shoulders, neck, forehead, eyes, cheeks and lips, tongue and teeth, chest, upper back, stomach, buttocks, thighs, calves, ankles, feet   Tense (10 seconds,   of the way), then relax each muscle (all the way)   Notice the tension   Notice the difference when relaxed (by tensing first, and then relaxing, you are able to get a more thorough relaxation than by simply relaxing)      P: Paced breathing to relax   The standard technique is to begin with counting the number of steps one takes for a typical inhale, then counting the steps one takes for a typical exhale, and then lengthening the amount of steps for the exhalation by one or two steps.  OR  Repeat this pattern for 1-2 minutes  Inhale for four (4) seconds   Exhale for six (6) to eight (8) seconds   Research demonstrated that one can change one's overall level of anxiety by doing this exercise for even a few minutes per day       You will be called to schedule an appointment with a mental health specialist.  If for some reason you do not hear from them within the next 24 hours please return to the ER for  recheck.

## 2023-04-22 ENCOUNTER — TELEPHONE (OUTPATIENT)
Dept: EMERGENCY MEDICINE | Facility: CLINIC | Age: 48
End: 2023-04-22
Payer: COMMERCIAL

## 2023-04-22 LAB — BACTERIA UR CULT: ABNORMAL

## 2023-04-22 NOTE — TELEPHONE ENCOUNTER
[facility] Emergency Department/Urgent Care Lab result notification  [Note:  ED Lab Results RN will reference the [facility] Emergency Dept visit note prior to contacting patient AND/OR prior to consulting Emergency Dept Provider.  Highlights of Emergency Dept visit in information summary at the bottom of this telephone note]    1. Reason for call    Notify of lab results    Assess patient symptoms [if necessary]    Review ED Providers recommendations/discharge instructions (if necessary)    Advise per [facility] ED lab result protocol    2. Lab Result (including Rx patient on, if applicable).  If culture, copy of lab report at bottom.  Preliminary urine culture report on 4/22/23 shows the presence of bacteria(s):  >100,000 CFU/ML Escherichia coli  Emergency Dept/Urgent Care discharge antibiotic: None  Recommendations per [facility] ED Lab result Urine culture protocol.    3. RN Assessment (Patient's current Symptoms):    Time of call: 4/22/2023 9:47 AM  Assessment: Left voicemail message requesting a call back to [facility] ED Lab Result RN at [phone].  RN is available every day between 9 a.m. and 5:30 p.m.        Information summary from Emergency Dept/Urgent Care visit on 4/20/23  Symptoms reported at ED visit (Chief complaint, HPI) Chief Complaint   Patient presents with     Mental Health Problem      HPI  Marjorie Holbrook is a 47 year old female who presented to the emergency room with her significant other secondary to concerns of mental health issues.  She states that she has had history for depression and anxiety in the past.  She states that she has had 2 anxiety attacks most recently about 2 weeks ago in the last month or so.  She also states that she is having increase feelings of her brain just being unable to her rest or sleep.  She states it feels like her brain is just working overtime.  She states that she has had some occasional thoughts of  self-harm but currently has no plan and does not feel like she would harm herself this time of the exam.  She has been taking her med health medications as directed without change over the last several years.  Patient admits to occasional use of marijuana to her cope with her brain being unable to slow down.  She denies any recent illness.  She denies any recent trauma or injury.     Significant Medical hx, if applicable (i.e. CKD, diabetes) Reviewed   Allergies Allergies   Allergen Reactions     Metronidazole Nausea and Vomiting      Weight, if applicable Wt Readings from Last 2 Encounters:   11/18/22 64 kg (141 lb)   12/17/21 62.7 kg (138 lb 4 oz)      Coumadin/Warfarin [Yes /No] NO   Creatinine Level (mg/dl) Creatinine   Date Value Ref Range Status   04/20/2023 0.87 0.51 - 0.95 mg/dL Final   12/04/2020 0.76 0.52 - 1.04 mg/dL Final      Creatinine clearance (ml/min), if applicable Serum creatinine: 0.87 mg/dL 04/20/23 2234  Estimated creatinine clearance: 79 mL/min   Pregnant (Yes/No/NA) Not tested - unknown   Breastfeeding (Yes/No/NA) Unknown   ED providers Impression and Plan (applicable information) Assessments & Plan (with Medical Decision Making)  47-year-old female comes into the ER secondary to concerns of her mind racing and inability to sleep.  Symptoms have been present on and on for the last month but worse over the last week or so.  Patient agreed to allow for physical exam and evaluation.  She also was evaluated by the DEC service.  Patient felt a lot of her anxiety was over the overwhelming workload she has been undertaking in the last week.  Patient was evaluated by the DEC service and recommendation made for outpatient treatment and counseling services.  They will work her in to an outpatient clinic in the next few days.  Patient felt good about this plan of care.  Patient was able to contract for safety.  She was discharged to care of her .      I have reviewed the nursing notes.     I have  reviewed the findings, diagnosis, plan and need for follow up with the patient.     Medical Decision Making  The patient's presentation was of moderate complexity (a chronic illness mild to moderate exacerbation, progression, or side effect of treatment).     The patient's evaluation involved:  ordering and/or review of 3+ test(s) in this encounter (see separate area of note for details)     The patient's management necessitated moderate risk (limitations due to social determinants of health (see separate area of note for details)).              Final diagnoses:   Attention deficit hyperactivity disorder (ADHD), predominantly inattentive type   Borderline personality disorder (H)   Moderate episode of recurrent major depressive disorder (H)         4/20/2023   [facility] EMERGENCY DEPT     Cedric Melo DO  04/21/23 0405        ED diagnosis  Attention deficit hyperactivity disorder (ADHD), predominantly inattentive type     Borderline personality disorder (H)     Moderate episode of recurrent major depressive disorder (H)     ED provider  Cedric Melo DO        Copy of Lab report (if applicable)        Christopher Mcneill RN  [facility] [employer] [place]  Emergency Dept Lab Result RN  Ph# [phone]

## 2023-04-23 ENCOUNTER — HEALTH MAINTENANCE LETTER (OUTPATIENT)
Age: 48
End: 2023-04-23

## 2023-04-23 RX ORDER — NITROFURANTOIN 25; 75 MG/1; MG/1
100 CAPSULE ORAL 2 TIMES DAILY
Qty: 10 CAPSULE | Refills: 0 | Status: SHIPPED | OUTPATIENT
Start: 2023-04-23 | End: 2023-04-28

## 2023-04-23 NOTE — TELEPHONE ENCOUNTER
"[facility]) Emergency Department/Urgent Care Lab result notification  [Note:  ED Lab Results RN will reference the [facility] Emergency Dept visit note prior to contacting patient AND/OR prior to consulting Emergency Dept Provider.  Highlights of Emergency Dept visit in information summary at the bottom of this telephone note]    1. Reason for call    Notify of lab results    Assess patient symptoms [if necessary]    Review ED Providers recommendations/discharge instructions (if necessary)    Advise per [facility] ED lab result protocol    2. Lab Result (including Rx patient on, if applicable).  If culture, copy of lab report at bottom.  Final urine culture on 4/22/23 shows the presence of bacteria(s): >100,000 CFU/ML Escherichia coli  [facility] Emergency Dept discharge antibiotic: None  Recommendations in treatment per [facility] ED lab result Urine Culture protocol.    3. RN Assessment (Patient's current Symptoms):    Time of call: 4/23/2023 11:24 AM    Assessment:  Gentry - has consent to communicate on file - was reachable and he gave phone to patient to discuss lab results    Genitourinary symptoms:  A little pain/burning - no frequency says \"not going a lot\", no urgency    Nausea/vomiting:  None - \"not that hungry\"    Flank pain:  None    Fever:  None    4. RN Recommendations/Instructions per [place] ED lab result protocol    [facility] ED lab result protocol used: Urine culture    Patient notified of lab result and treatment recommendations (Yes/No): YES    Start Rx for Nitrofurantoin Macrocrystal-Monohydrate (Macrobid) 100 mg PO capsule, 1 capsule (100 mg) by mouth 2 times daily for 5 days [place]      RN reviewed information about UTI prevention, probiotic, when to follow up for ongoing symptoms - welcome to return for any fevers, flank pain, nausea/vomiting, urinary retention - all questions answered patient " verbalized understanding and agrees with plan.    5. Please Contact your PCP clinic or return to the Emergency department if your:    Symptoms return.    Symptoms do not improve after 3 days on antibiotic.    Symptoms do not resolve after completing antibiotic.    Symptoms worsen or other concerning symptoms.      Copy of Lab report (if applicable)        Christopher Mcneill RN  [facility]  Emergency Dept Lab Result RN  Ph# [phone]

## 2023-07-25 ENCOUNTER — MYC REFILL (OUTPATIENT)
Dept: FAMILY MEDICINE | Facility: CLINIC | Age: 48
End: 2023-07-25
Payer: COMMERCIAL

## 2023-07-25 DIAGNOSIS — F90.0 ATTENTION DEFICIT HYPERACTIVITY DISORDER (ADHD), PREDOMINANTLY INATTENTIVE TYPE: ICD-10-CM

## 2023-07-25 RX ORDER — DEXTROAMPHETAMINE SACCHARATE, AMPHETAMINE ASPARTATE, DEXTROAMPHETAMINE SULFATE AND AMPHETAMINE SULFATE 3.75; 3.75; 3.75; 3.75 MG/1; MG/1; MG/1; MG/1
15 TABLET ORAL DAILY
Qty: 30 TABLET | Refills: 0 | Status: CANCELLED | OUTPATIENT
Start: 2023-07-25

## 2023-07-25 RX ORDER — DEXTROAMPHETAMINE SACCHARATE, AMPHETAMINE ASPARTATE, DEXTROAMPHETAMINE SULFATE AND AMPHETAMINE SULFATE 7.5; 7.5; 7.5; 7.5 MG/1; MG/1; MG/1; MG/1
30 TABLET ORAL DAILY
Qty: 30 TABLET | Refills: 0 | Status: CANCELLED | OUTPATIENT
Start: 2023-07-25

## 2023-07-25 NOTE — TELEPHONE ENCOUNTER
Patient informed medication increase will need an appointment to discuss. Patients last appointment was 11/2022.     Closing encounter.  Ariana Leblanc MA

## 2023-08-19 DIAGNOSIS — F33.1 MODERATE EPISODE OF RECURRENT MAJOR DEPRESSIVE DISORDER (H): ICD-10-CM

## 2023-08-19 DIAGNOSIS — F41.1 GENERALIZED ANXIETY DISORDER: ICD-10-CM

## 2023-08-21 RX ORDER — VENLAFAXINE HYDROCHLORIDE 150 MG/1
CAPSULE, EXTENDED RELEASE ORAL
Qty: 90 CAPSULE | Refills: 0 | Status: SHIPPED | OUTPATIENT
Start: 2023-08-21 | End: 2023-11-20

## 2023-08-21 RX ORDER — VENLAFAXINE HYDROCHLORIDE 75 MG/1
CAPSULE, EXTENDED RELEASE ORAL
Qty: 90 CAPSULE | Refills: 0 | Status: SHIPPED | OUTPATIENT
Start: 2023-08-21 | End: 2023-11-20

## 2023-08-21 NOTE — TELEPHONE ENCOUNTER
"    Requested Prescriptions   Pending Prescriptions Disp Refills    venlafaxine (EFFEXOR XR) 75 MG 24 hr capsule [Pharmacy Med Name: VENLAFAXINE HCL ER 75MG CP24] 90 capsule 1     Sig: TAKE ONE CAPSULE BY MOUTH ONCE DAILY ALONG WITH 150MG CAPSULE TO EQUAL 225MG DAILY       Serotonin-Norepinephrine Reuptake Inhibitors  Failed - 8/19/2023  5:14 AM        Failed - Blood pressure under 140/90 in past 12 months     BP Readings from Last 3 Encounters:   04/20/23 (!) 141/72   11/18/22 100/68   12/17/21 118/70                 Failed - PHQ-9 score of less than 5 in past 6 months     Please review last PHQ-9 score.           Failed - Recent (6 mo) or future (30 days) visit within the authorizing provider's specialty     Patient had office visit in the last 6 months or has a visit in the next 30 days with authorizing provider or within the authorizing provider's specialty.  See \"Patient Info\" tab in inbasket, or \"Choose Columns\" in Meds & Orders section of the refill encounter.            Passed - Medication is active on med list        Passed - Patient is age 18 or older        Passed - No active pregnancy on record        Passed - Normal serum creatinine on file in past 12 months     Recent Labs   Lab Test 04/20/23  2234   CR 0.87       Ok to refill medication if creatinine is low          Passed - No positive pregnancy test in past 12 months          venlafaxine (EFFEXOR XR) 150 MG 24 hr capsule [Pharmacy Med Name: VENLAFAXINE HCL ER 150MG CP24] 90 capsule 1     Sig: TAKE ONE CAPSULE BY MOUTH ONCE DAILY ALONG WITH 75MG CAPSULE TO EQUAL 225MG TOTAL       Serotonin-Norepinephrine Reuptake Inhibitors  Failed - 8/19/2023  5:14 AM        Failed - Blood pressure under 140/90 in past 12 months     BP Readings from Last 3 Encounters:   04/20/23 (!) 141/72   11/18/22 100/68   12/17/21 118/70                 Failed - PHQ-9 score of less than 5 in past 6 months     Please review last PHQ-9 score.           Failed - Recent (6 mo) or " "future (30 days) visit within the authorizing provider's specialty     Patient had office visit in the last 6 months or has a visit in the next 30 days with authorizing provider or within the authorizing provider's specialty.  See \"Patient Info\" tab in inbasket, or \"Choose Columns\" in Meds & Orders section of the refill encounter.            Passed - Medication is active on med list        Passed - Patient is age 18 or older        Passed - No active pregnancy on record        Passed - Normal serum creatinine on file in past 12 months     Recent Labs   Lab Test 04/20/23  2234   CR 0.87       Ok to refill medication if creatinine is low          Passed - No positive pregnancy test in past 12 months             "

## 2023-08-21 NOTE — TELEPHONE ENCOUNTER
3-month supply refilled.  Please follow-up before med run out.  She needs a physical and general follow-up.  She also needs a Pap.

## 2023-09-15 DIAGNOSIS — G47.00 INSOMNIA, UNSPECIFIED TYPE: ICD-10-CM

## 2023-09-15 RX ORDER — QUETIAPINE FUMARATE 25 MG/1
TABLET, FILM COATED ORAL
Qty: 270 TABLET | Refills: 0 | Status: SHIPPED | OUTPATIENT
Start: 2023-09-15 | End: 2023-12-27

## 2023-10-19 ENCOUNTER — PATIENT OUTREACH (OUTPATIENT)
Dept: CARE COORDINATION | Facility: CLINIC | Age: 48
End: 2023-10-19
Payer: COMMERCIAL

## 2023-11-02 ENCOUNTER — PATIENT OUTREACH (OUTPATIENT)
Dept: CARE COORDINATION | Facility: CLINIC | Age: 48
End: 2023-11-02
Payer: COMMERCIAL

## 2023-11-19 DIAGNOSIS — F41.1 GENERALIZED ANXIETY DISORDER: ICD-10-CM

## 2023-11-19 DIAGNOSIS — F33.1 MODERATE EPISODE OF RECURRENT MAJOR DEPRESSIVE DISORDER (H): ICD-10-CM

## 2023-11-20 RX ORDER — VENLAFAXINE HYDROCHLORIDE 75 MG/1
CAPSULE, EXTENDED RELEASE ORAL
Qty: 30 CAPSULE | Refills: 0 | Status: SHIPPED | OUTPATIENT
Start: 2023-11-20 | End: 2023-12-22

## 2023-11-20 RX ORDER — VENLAFAXINE HYDROCHLORIDE 150 MG/1
CAPSULE, EXTENDED RELEASE ORAL
Qty: 30 CAPSULE | Refills: 0 | Status: SHIPPED | OUTPATIENT
Start: 2023-11-20 | End: 2023-12-22

## 2023-11-20 NOTE — TELEPHONE ENCOUNTER
1 month supply refilled, please have patient follow-up before med run out.  She needs a physical with general follow-up.  Thank you

## 2023-12-22 ENCOUNTER — MYC MEDICAL ADVICE (OUTPATIENT)
Dept: FAMILY MEDICINE | Facility: CLINIC | Age: 48
End: 2023-12-22
Payer: COMMERCIAL

## 2023-12-22 DIAGNOSIS — F41.1 GENERALIZED ANXIETY DISORDER: ICD-10-CM

## 2023-12-22 DIAGNOSIS — F33.1 MODERATE EPISODE OF RECURRENT MAJOR DEPRESSIVE DISORDER (H): ICD-10-CM

## 2023-12-22 RX ORDER — VENLAFAXINE HYDROCHLORIDE 75 MG/1
CAPSULE, EXTENDED RELEASE ORAL
Qty: 30 CAPSULE | Refills: 0 | Status: SHIPPED | OUTPATIENT
Start: 2023-12-22 | End: 2024-01-29

## 2023-12-22 RX ORDER — VENLAFAXINE HYDROCHLORIDE 150 MG/1
CAPSULE, EXTENDED RELEASE ORAL
Qty: 30 CAPSULE | Refills: 0 | Status: SHIPPED | OUTPATIENT
Start: 2023-12-22 | End: 2024-01-29

## 2023-12-22 NOTE — TELEPHONE ENCOUNTER
Patient informed via mychart to schedule. 12/28 reminder if not read to send letter.     Closing encounter.   Ariana Leblanc MA

## 2023-12-22 NOTE — TELEPHONE ENCOUNTER
1 month supply refilled, must follow-up before med run out.  She needs a physical and general follow-up.  Thank you

## 2023-12-22 NOTE — LETTER
December 28, 2023      Marjorie Holbrook  [address] [place] [address]        Dear Marjorie,     We are concerned about your health care.  We recently provided you with a medication refill.  Many medications require routine follow-up with your Doctor.       At this time we ask that: You schedule a routine office visit with your physician to follow your medications and schedule a general physical as well.  Call the clinic at [phone] Option 1 to schedule.      Your prescription:  Has been filled. Please schedule a follow up visit for first available appointment. Courtesy refills will be given if needed until your scheduled appointment.         Thank you,     Ovidio Toribio MD/ Care Team

## 2023-12-27 DIAGNOSIS — F33.1 MODERATE EPISODE OF RECURRENT MAJOR DEPRESSIVE DISORDER (H): ICD-10-CM

## 2023-12-27 DIAGNOSIS — G47.00 INSOMNIA, UNSPECIFIED TYPE: ICD-10-CM

## 2023-12-27 RX ORDER — QUETIAPINE FUMARATE 25 MG/1
TABLET, FILM COATED ORAL
Qty: 270 TABLET | Refills: 0 | Status: SHIPPED | OUTPATIENT
Start: 2023-12-27

## 2023-12-27 RX ORDER — LAMOTRIGINE 200 MG/1
200 TABLET ORAL DAILY
Qty: 90 TABLET | Refills: 3 | Status: SHIPPED | OUTPATIENT
Start: 2023-12-27

## 2024-01-27 DIAGNOSIS — F41.1 GENERALIZED ANXIETY DISORDER: ICD-10-CM

## 2024-01-27 DIAGNOSIS — F33.1 MODERATE EPISODE OF RECURRENT MAJOR DEPRESSIVE DISORDER (H): ICD-10-CM

## 2024-01-29 RX ORDER — VENLAFAXINE HYDROCHLORIDE 75 MG/1
CAPSULE, EXTENDED RELEASE ORAL
Qty: 30 CAPSULE | Refills: 0 | Status: SHIPPED | OUTPATIENT
Start: 2024-01-29 | End: 2024-03-11

## 2024-01-29 RX ORDER — VENLAFAXINE HYDROCHLORIDE 150 MG/1
CAPSULE, EXTENDED RELEASE ORAL
Qty: 30 CAPSULE | Refills: 0 | Status: SHIPPED | OUTPATIENT
Start: 2024-01-29 | End: 2024-03-11

## 2024-01-30 ENCOUNTER — MYC MEDICAL ADVICE (OUTPATIENT)
Dept: FAMILY MEDICINE | Facility: CLINIC | Age: 49
End: 2024-01-30
Payer: COMMERCIAL

## 2024-01-30 NOTE — LETTER
1/30/2024        RE: Marjorie Holbrook  [address]  [place] [facility] [address]        Marjorie,      We are concerned about your health care.  We recently provided you with a medication refill.  Many medications require routine follow-up with your Doctor.       At this time we ask that: You schedule a routine office visit with your physician to follow your medications. Also are needing a physical appointment.  Call the clinic at [phone] Option 1 to schedule.      Your prescription:  Has been filled. Please schedule a follow up visit for first available appointment. Courtesy refills will be given if needed until your scheduled appointment.         Thank you,     Ovidio Toribio MD/ Care Team

## 2024-01-30 NOTE — TELEPHONE ENCOUNTER
1 month supply refilled, must follow-up before med run out.  Needs a physical and general follow-up.

## 2024-01-30 NOTE — TELEPHONE ENCOUNTER
Patient informed via mychart to schedule. 2/2 reminder if not read to send letter.     Closing encounter.   Ariana Leblanc MA

## 2024-02-05 ENCOUNTER — NURSE TRIAGE (OUTPATIENT)
Dept: FAMILY MEDICINE | Facility: CLINIC | Age: 49
End: 2024-02-05
Payer: COMMERCIAL

## 2024-02-05 NOTE — TELEPHONE ENCOUNTER
"S-(situation): Patient is calling and stated she takes her BP on a daily basis.  She stated today she took her BP with a reading of 218/112 and another reading was 201/118.  Patient verbalized she is feeling lightheaded, no other symptoms at this time.      B-(background): Hypertension, takes medication and has not missed a dose    A-(assessment): Asymptomatic hypertension    R-(recommendations): Go to office now per RN protocol.  No available office visits at this time.  Offered patient ADS option.  Patient stated she feels she will go to the emergency room at this time.  Patient stated she will phone back if she changes her mind on the ADS option.    Reason for Disposition   Systolic BP >= 200 OR Diastolic >= 120 and having NO cardiac or neurologic symptoms    Additional Information   Negative: Sounds like a life-threatening emergency to the triager   Negative: Symptom is main concern (e.g., headache, chest pain)   Negative: Low blood pressure is main concern   Negative: Systolic BP >= 160 OR Diastolic >= 100, and any cardiac (e.g., breathing difficulty, chest pain) or neurologic symptoms (e.g., new-onset blurred or double vision)   Negative: Pregnant 20 or more weeks (or postpartum < 6 weeks) with new hand or face swelling   Negative: Pregnant 20 or more weeks (or postpartum < 6 weeks) and Systolic BP >= 160 OR Diastolic >= 110   Negative: Patient sounds very sick or weak to the triager    Answer Assessment - Initial Assessment Questions  1. BLOOD PRESSURE: \"What is the blood pressure?\" \"Did you take at least two measurements 5 minutes apart?\"      218/112,  201/118 -     2. ONSET: \"When did you take your blood pressure?\"      This morning    3. HOW: \"How did you take your blood pressure?\" (e.g., automatic home BP monitor, visiting nurse)      Home monitor - wrist monitor    4. HISTORY: \"Do you have a history of high blood pressure?\"      Yes    5. MEDICINES: \"Are you taking any medicines for blood pressure?\" " "\"Have you missed any doses recently?\"      Yes    6. OTHER SYMPTOMS: \"Do you have any symptoms?\" (e.g., blurred vision, chest pain, difficulty breathing, headache, weakness)      Some shortness of breath intermittently during the past week, lightheaded    7. PREGNANCY: \"Is there any chance you are pregnant?\" \"When was your last menstrual period?\"      Unknown    Protocols used: Blood Pressure - High-A-[place]  Yazmin Monroy RN    "

## 2024-02-11 ENCOUNTER — HEALTH MAINTENANCE LETTER (OUTPATIENT)
Age: 49
End: 2024-02-11

## 2024-03-11 ENCOUNTER — TELEPHONE (OUTPATIENT)
Dept: FAMILY MEDICINE | Facility: CLINIC | Age: 49
End: 2024-03-11
Payer: COMMERCIAL

## 2024-03-11 DIAGNOSIS — F33.1 MODERATE EPISODE OF RECURRENT MAJOR DEPRESSIVE DISORDER (H): ICD-10-CM

## 2024-03-11 DIAGNOSIS — F41.1 GENERALIZED ANXIETY DISORDER: ICD-10-CM

## 2024-03-11 RX ORDER — VENLAFAXINE HYDROCHLORIDE 150 MG/1
CAPSULE, EXTENDED RELEASE ORAL
Qty: 15 CAPSULE | Refills: 0 | Status: SHIPPED | OUTPATIENT
Start: 2024-03-11

## 2024-03-11 RX ORDER — VENLAFAXINE HYDROCHLORIDE 75 MG/1
CAPSULE, EXTENDED RELEASE ORAL
Qty: 15 CAPSULE | Refills: 0 | Status: SHIPPED | OUTPATIENT
Start: 2024-03-11

## 2024-03-11 NOTE — LETTER
March 14, 2024      Marjorie Holbrook  [id number] [address]        Dear Marjorie,     We are concerned about your health care.  We recently provided you with a medication refill.  Many medications require routine follow-up with your Doctor.      At this time we ask that: You schedule a routine office visit with your physician to follow your medications and general physical. Call the clinic at [phone] Option 1 to schedule.     Your prescription:   2 week supply was refilled. Must follow up before medications runs out.         Sincerely,        Ovidio Jenkins Mai, MD/ Care Team

## 2024-03-12 NOTE — TELEPHONE ENCOUNTER
2-week supply refilled.  Must follow-up before med run out.  Needed physical and general follow-up.  She was asked to follow-up before.

## 2024-03-14 NOTE — TELEPHONE ENCOUNTER
2nd attempt, called and LM for patient to call back. Unable to reach. Letter sent to schedule.       Ariana Leblanc MA

## 2024-03-27 DIAGNOSIS — G47.00 INSOMNIA, UNSPECIFIED TYPE: ICD-10-CM

## 2024-03-27 RX ORDER — QUETIAPINE FUMARATE 25 MG/1
TABLET, FILM COATED ORAL
Qty: 270 TABLET | Refills: 0 | OUTPATIENT
Start: 2024-03-27

## 2024-03-27 NOTE — TELEPHONE ENCOUNTER
Need to follow-up before refill.  Last appointment was over a year ago.  She has been asked to come in in the last couple months but failed to make appointment.

## 2024-03-27 NOTE — TELEPHONE ENCOUNTER
Patient informed via mychart to schedule. 4/1 reminder if not read to send letter.     Closing encounter.   Ariana Leblanc MA

## 2024-05-16 ENCOUNTER — PATIENT OUTREACH (OUTPATIENT)
Dept: CARE COORDINATION | Facility: CLINIC | Age: 49
End: 2024-05-16
Payer: COMMERCIAL

## 2025-01-15 ENCOUNTER — ANCILLARY ORDERS (OUTPATIENT)
Dept: MAMMOGRAPHY | Facility: CLINIC | Age: 50
End: 2025-01-15

## 2025-01-15 DIAGNOSIS — Z12.31 VISIT FOR SCREENING MAMMOGRAM: Primary | ICD-10-CM

## 2025-01-16 ENCOUNTER — PATIENT OUTREACH (OUTPATIENT)
Dept: CARE COORDINATION | Facility: CLINIC | Age: 50
End: 2025-01-16
Payer: COMMERCIAL

## 2025-01-17 ENCOUNTER — HOSPITAL ENCOUNTER (OUTPATIENT)
Dept: MAMMOGRAPHY | Facility: CLINIC | Age: 50
Discharge: HOME | End: 2025-01-17
Attending: NURSE PRACTITIONER | Admitting: NURSE PRACTITIONER
Payer: COMMERCIAL

## 2025-01-17 DIAGNOSIS — Z12.31 VISIT FOR SCREENING MAMMOGRAM: ICD-10-CM

## 2025-01-17 PROCEDURE — 77063 BREAST TOMOSYNTHESIS BI: CPT

## 2025-01-17 PROCEDURE — 77067 SCR MAMMO BI INCL CAD: CPT

## 2025-03-08 ENCOUNTER — HEALTH MAINTENANCE LETTER (OUTPATIENT)
Age: 50
End: 2025-03-08